# Patient Record
Sex: MALE | Race: WHITE | NOT HISPANIC OR LATINO | Employment: UNEMPLOYED | URBAN - METROPOLITAN AREA
[De-identification: names, ages, dates, MRNs, and addresses within clinical notes are randomized per-mention and may not be internally consistent; named-entity substitution may affect disease eponyms.]

---

## 2018-04-24 ENCOUNTER — HOSPITAL ENCOUNTER (EMERGENCY)
Facility: HOSPITAL | Age: 53
Discharge: HOME | End: 2018-04-24
Attending: EMERGENCY MEDICINE
Payer: MEDICAID

## 2018-04-24 VITALS
WEIGHT: 195.13 LBS | SYSTOLIC BLOOD PRESSURE: 161 MMHG | HEART RATE: 71 BPM | BODY MASS INDEX: 30.63 KG/M2 | RESPIRATION RATE: 20 BRPM | OXYGEN SATURATION: 97 % | TEMPERATURE: 99 F | DIASTOLIC BLOOD PRESSURE: 90 MMHG | HEIGHT: 67 IN

## 2018-04-24 DIAGNOSIS — F15.10 METHAMPHETAMINE ABUSE: ICD-10-CM

## 2018-04-24 DIAGNOSIS — L29.9 ITCHING: ICD-10-CM

## 2018-04-24 DIAGNOSIS — L73.2 HIDRADENITIS SUPPURATIVA: Primary | ICD-10-CM

## 2018-04-24 PROCEDURE — 99283 EMERGENCY DEPT VISIT LOW MDM: CPT

## 2018-04-24 RX ORDER — HYDROXYZINE PAMOATE 25 MG/1
25 CAPSULE ORAL 4 TIMES DAILY
Qty: 30 CAPSULE | Refills: 0 | Status: ON HOLD | OUTPATIENT
Start: 2018-04-24 | End: 2024-02-02 | Stop reason: HOSPADM

## 2018-04-24 RX ORDER — DEXAMETHASONE 4 MG/1
4 TABLET ORAL DAILY
Qty: 5 TABLET | Refills: 0 | Status: SHIPPED | OUTPATIENT
Start: 2018-04-24 | End: 2018-04-29

## 2018-04-24 RX ORDER — DOXYCYCLINE 100 MG/1
100 CAPSULE ORAL 2 TIMES DAILY
Qty: 20 CAPSULE | Refills: 0 | Status: SHIPPED | OUTPATIENT
Start: 2018-04-24 | End: 2018-05-04

## 2018-04-25 NOTE — ED PROVIDER NOTES
SCRIBE #1 NOTE: I, Jana Meza, am scribing for, and in the presence of, Shawn March NP. I have scribed the entire note.      History      Chief Complaint   Patient presents with    Abscess     pt reports multiple sores/abscess to axilla with itching       Review of patient's allergies indicates:  No Known Allergies     HPI   HPI    4/24/2018, 10:11 PM   History obtained from the patient      History of Present Illness: David Guzman is a 52 y.o. male patient who presents to the Emergency Department for sores to bilateral axilla and itching to bilateral arms which onset gradually a few days ago. Symptoms are constant and moderate in severity.  No mitigating or exacerbating factors reported. No associated sxs reported. Patient denies any fever, chills, nausea, vomiting, cough, rash, and all other sxs at this time. Prior Tx reported. Pt admits to doing meth, but states he quit a week ago. No further complaints or concerns at this time.         Arrival mode: Personal vehicle     PCP: Primary Doctor No       Past Medical History:  Past Medical History:   Diagnosis Date    Alcohol abuse     Anxiety     Cocaine abuse     Drug abuse     Tobacco use        Past Surgical History:  Past Surgical History:   Procedure Laterality Date    HERNIA REPAIR           Family History:  Family History   Problem Relation Age of Onset    COPD Neg Hx        Social History:  Social History     Social History Main Topics    Smoking status: Former Smoker    Smokeless tobacco: Never Used    Alcohol use Yes      Comment: occasionally    Drug use: Yes     Types: Cocaine    Sexual activity: unknown       ROS   Review of Systems   Constitutional: Negative for chills and fever.   HENT: Negative for congestion, rhinorrhea and sore throat.    Respiratory: Negative for cough and shortness of breath.    Cardiovascular: Negative for chest pain.   Gastrointestinal: Negative for nausea and vomiting.   Genitourinary: Negative for  "dysuria and hematuria.   Musculoskeletal: Negative for back pain and neck pain.   Skin: Negative for rash.        (+) sores to bilateral axilla  (+) itching to bilateral arms     Neurological: Negative for dizziness and headaches.       Physical Exam      Initial Vitals [04/24/18 2051]   BP Pulse Resp Temp SpO2   (!) 161/90 71 20 98.5 °F (36.9 °C) 97 %      MAP       113.67          Physical Exam  Nursing Notes and Vital Signs Reviewed.  Constitutional: Patient is in no apparent distress. Well-developed and well-nourished.  Head: Atraumatic. Normocephalic.  Eyes: PERRL. EOM intact. Conjunctivae are not pale. No scleral icterus.  ENT: Mucous membranes are moist.    Neck: Supple. Full ROM. No lymphadenopathy.  Cardiovascular: Regular rate. Regular rhythm. No murmurs, rubs, or gallops. Distal pulses are 2+ and symmetric.  Pulmonary/Chest: No respiratory distress. Clear to auscultation bilaterally. No wheezing or rales.  Abdominal: Soft and non-distended.  There is no tenderness.  No rebound, guarding, or rigidity. Good bowel sounds.  Musculoskeletal: Moves all extremities. No obvious deformities. No edema. No calf tenderness.  Skin: Warm and dry. Deep areas of induration to left axilla with erythema. No fluctuance noted to surface.   Neurological:  Alert, awake, and appropriate.  Normal speech.  No acute focal neurological deficits are appreciated.  Psychiatric: Normal affect. Good eye contact. Appropriate in content.    ED Course    Procedures  ED Vital Signs:  Vitals:    04/24/18 2051   BP: (!) 161/90   Pulse: 71   Resp: 20   Temp: 98.5 °F (36.9 °C)   TempSrc: Oral   SpO2: 97%   Weight: 88.5 kg (195 lb 1.7 oz)   Height: 5' 7" (1.702 m)            The Emergency Provider reviewed the vital signs and test results, which are outlined above.    ED Discussion     10:17 PM:  Discussed with pt all pertinent ED information. Discussed pt dx and plan of tx. Gave pt all f/u and return to the ED instructions. All questions and " concerns were addressed at this time. Pt expresses understanding of information and instructions, and is comfortable with plan to discharge. Pt is stable for discharge.    I discussed with patient and/or family/caretaker that evaluation in the ED does not suggest any emergent or life threatening medical conditions requiring immediate intervention beyond what was provided in the ED, and I believe patient is safe for discharge.  Regardless, an unremarkable evaluation in the ED does not preclude the development or presence of a serious of life threatening condition. As such, patient was instructed to return immediately for any worsening or change in current symptoms.      ED Medication(s):  Medications - No data to display    Discharge Medication List as of 4/24/2018 10:13 PM      START taking these medications    Details   dexamethasone (DECADRON) 4 MG Tab Take 1 tablet (4 mg total) by mouth once daily., Starting Tue 4/24/2018, Until Sun 4/29/2018, Print      doxycycline (VIBRAMYCIN) 100 MG Cap Take 1 capsule (100 mg total) by mouth 2 (two) times daily., Starting Tue 4/24/2018, Until Fri 5/4/2018, Print      hydrOXYzine pamoate (VISTARIL) 25 MG Cap Take 1 capsule (25 mg total) by mouth 4 (four) times daily., Starting Tue 4/24/2018, Print             Follow-up Information     [facility]    Specialty:  Emergency Medicine  Why:  As needed, If symptoms worsen  Contact information:  [address]  [facility] [address] [phone]                   Medical Decision Making              Scribe Attestation:   Scribe #1: I performed the above scribed service and the documentation accurately describes the services I performed. I attest to the accuracy of the note.    Attending:   Physician Attestation Statement for Scribe #1: I, Shawn March NP, personally performed the services described in this documentation, as scribed by Jana Meza, in my presence, and it is both accurate and  complete.          Clinical Impression       ICD-10-CM ICD-9-CM   1. Hidradenitis suppurativa L73.2 705.83   2. Itching L29.9 698.9   3. Methamphetamine abuse F15.10 305.70       Disposition:   Disposition: Discharged  Condition: Stable         Shawn March NP  04/25/18 0026       Shawn March NP  04/25/18 0026

## 2020-07-31 ENCOUNTER — HOSPITAL ENCOUNTER (EMERGENCY)
Facility: HOSPITAL | Age: 55
Discharge: HOME | End: 2020-07-31
Attending: STUDENT IN AN ORGANIZED HEALTH CARE EDUCATION/TRAINING PROGRAM
Payer: MEDICAID

## 2020-07-31 VITALS
WEIGHT: 199.88 LBS | OXYGEN SATURATION: 98 % | RESPIRATION RATE: 18 BRPM | DIASTOLIC BLOOD PRESSURE: 87 MMHG | HEART RATE: 72 BPM | TEMPERATURE: 98 F | BODY MASS INDEX: 31.37 KG/M2 | HEIGHT: 67 IN | SYSTOLIC BLOOD PRESSURE: 154 MMHG

## 2020-07-31 DIAGNOSIS — K02.9 PAIN DUE TO DENTAL CARIES: Primary | ICD-10-CM

## 2020-07-31 PROCEDURE — 99284 EMERGENCY DEPT VISIT MOD MDM: CPT

## 2020-07-31 RX ORDER — AMOXICILLIN AND CLAVULANATE POTASSIUM 875; 125 MG/1; MG/1
1 TABLET, FILM COATED ORAL 2 TIMES DAILY
Qty: 14 TABLET | Refills: 0 | Status: SHIPPED | OUTPATIENT
Start: 2020-07-31 | End: 2020-08-07

## 2020-07-31 RX ORDER — TRAMADOL HYDROCHLORIDE 50 MG/1
50 TABLET ORAL EVERY 6 HOURS PRN
Qty: 12 TABLET | Refills: 0 | Status: ON HOLD | OUTPATIENT
Start: 2020-07-31 | End: 2024-02-02 | Stop reason: HOSPADM

## 2020-08-01 NOTE — ED PROVIDER NOTES
HISTORY     Chief Complaint   Patient presents with    Dental Pain     States has bad tooth with infection.  Requesting some antibiotics and pain medication.  Never followed up with dentist.     Review of patient's allergies indicates:  No Known Allergies     HPI   The history is provided by the patient. No  was used.   Dental Pain  Primary symptoms do not include dental injury, oral bleeding, oral lesions, headaches, fever, shortness of breath, sore throat or angioedema. Primary symptoms comment: right lower tooth pain. The symptoms began several days ago. The symptoms are worsening. The symptoms are recurrent. The symptoms occur constantly.   Additional symptoms do not include: dental sensitivity to temperature, gum swelling, gum tenderness, purulent gums, trismus, jaw pain, facial swelling, trouble swallowing, pain with swallowing, excessive salivation, dry mouth, taste disturbance, smell disturbance, drooling, ear pain, hearing loss, nosebleeds, swollen glands, goiter and fatigue.        PCP: Martha Chung MD     Past Medical History:  Past Medical History:   Diagnosis Date    Alcohol abuse     Anxiety     Cocaine abuse     Drug abuse     Tobacco use         Past Surgical History:  Past Surgical History:   Procedure Laterality Date    HERNIA REPAIR          Family History:  Family History   Problem Relation Age of Onset    COPD Neg Hx         Social History:  Social History     Tobacco Use    Smoking status: Former Smoker    Smokeless tobacco: Never Used   Substance and Sexual Activity    Alcohol use: Yes     Comment: occasionally    Drug use: Yes     Types: Cocaine    Sexual activity: Not on file         ROS   Review of Systems   Constitutional: Negative for fatigue and fever.   HENT: Positive for dental problem. Negative for drooling, ear pain, facial swelling, hearing loss, nosebleeds, sore throat and trouble swallowing.    Respiratory: Negative for shortness of  "breath.    Cardiovascular: Negative for chest pain.   Gastrointestinal: Negative for nausea.   Genitourinary: Negative for dysuria.   Musculoskeletal: Negative for back pain.   Skin: Negative for rash.   Neurological: Negative for dizziness, weakness and headaches.   Hematological: Does not bruise/bleed easily.   Psychiatric/Behavioral: Negative for agitation.       PHYSICAL EXAM     Initial Vitals [07/31/20 2152]   BP Pulse Resp Temp SpO2   (!) 154/87 72 18 98 °F (36.7 °C) 98 %      MAP       --           Physical Exam    Nursing note and vitals reviewed.  Constitutional: He appears well-developed and well-nourished. He is not diaphoretic. No distress.   HENT:   Head: Normocephalic and atraumatic.   Dental decay to right lower second molar. No abscess. No drainage, no trismus    Eyes: Right eye exhibits no discharge. Left eye exhibits no discharge.   Neck: Normal range of motion. Neck supple.   Cardiovascular: Normal rate.   Pulmonary/Chest: Breath sounds normal. No respiratory distress. He has no wheezes. He has no rhonchi. He has no rales.   Abdominal: Soft. Bowel sounds are normal.   Musculoskeletal: Normal range of motion.   Neurological: He is alert and oriented to person, place, and time. He has normal strength.   Skin: Skin is warm and dry.   Psychiatric: He has a normal mood and affect. His behavior is normal. Thought content normal.          ED COURSE   Procedures  ED ONGOING VITALS:  Vitals:    07/31/20 2152   BP: (!) 154/87   Pulse: 72   Resp: 18   Temp: 98 °F (36.7 °C)   TempSrc: Oral   SpO2: 98%   Weight: 90.6 kg (199 lb 13.6 oz)   Height: 5' 7" (1.702 m)         ABNORMAL LAB VALUES:  Labs Reviewed   HIV 1 / 2 ANTIBODY   HEPATITIS C ANTIBODY         ALL LAB VALUES:  none      RADIOLOGY STUDIES:  Imaging Results    None                   The above vital signs and test results have been reviewed by the emergency provider.     ED Medications:  Current Discharge Medication List        Discharge " Medications:  New Prescriptions    AMOXICILLIN-CLAVULANATE 875-125MG (AUGMENTIN) 875-125 MG PER TABLET    Take 1 tablet by mouth 2 (two) times daily. for 7 days    TRAMADOL (ULTRAM) 50 MG TABLET    Take 1 tablet (50 mg total) by mouth every 6 (six) hours as needed for Pain.      Follow-up Information     Schedule an appointment as soon as possible for a visit  with dentist of choice.                10:08 PM    I discussed with patient and/or family/caretaker that evaluation in the ED does not suggest any emergent or life threatening medical conditions requiring immediate intervention beyond what was provided in the ED, and I believe patient is safe for discharge. Regardless, an unremarkable evaluation in the ED does not preclude the development or presence of a serious or life threatening condition. As such, patient was instructed to return immediately for any worsening or change in current symptoms.    Pre-hypertension/Hypertension: The pt has been informed that they may have pre-hypertension or hypertension based on a blood pressure reading in the ED. I recommend that the pt call the PCP listed on their discharge instructions or a physician of their choice this week to arrange f/u for further evaluation of possible pre-hypertension or hypertension.       MEDICAL DECISION MAKING                 CLINICAL IMPRESSION       ICD-10-CM ICD-9-CM   1. Pain due to dental caries  K02.9 521.00       Disposition:   Disposition: Discharged  Condition: Stable         Sidney Orona NP  07/31/20 0956

## 2020-10-10 ENCOUNTER — HOSPITAL ENCOUNTER (EMERGENCY)
Facility: HOSPITAL | Age: 55
Discharge: HOME | End: 2020-10-10
Attending: EMERGENCY MEDICINE
Payer: MEDICAID

## 2020-10-10 VITALS
DIASTOLIC BLOOD PRESSURE: 104 MMHG | RESPIRATION RATE: 16 BRPM | OXYGEN SATURATION: 97 % | HEART RATE: 70 BPM | TEMPERATURE: 98 F | WEIGHT: 205.94 LBS | HEIGHT: 67 IN | BODY MASS INDEX: 32.32 KG/M2 | SYSTOLIC BLOOD PRESSURE: 174 MMHG

## 2020-10-10 DIAGNOSIS — K02.9 DENTAL CARIES: Primary | ICD-10-CM

## 2020-10-10 PROCEDURE — 99284 EMERGENCY DEPT VISIT MOD MDM: CPT

## 2020-10-10 RX ORDER — KETOROLAC TROMETHAMINE 10 MG/1
10 TABLET, FILM COATED ORAL EVERY 6 HOURS PRN
Qty: 12 TABLET | Refills: 0 | Status: ON HOLD | OUTPATIENT
Start: 2020-10-10 | End: 2024-02-02 | Stop reason: HOSPADM

## 2020-10-10 RX ORDER — AMOXICILLIN 500 MG/1
500 CAPSULE ORAL 3 TIMES DAILY
Qty: 21 CAPSULE | Refills: 0 | Status: SHIPPED | OUTPATIENT
Start: 2020-10-10 | End: 2020-10-17

## 2020-10-10 NOTE — ED PROVIDER NOTES
Encounter Date: 10/10/2020       History     Chief Complaint   Patient presents with    Dental Pain     x 2 months, right lower tooth     55 year old male with complaint of right bottom tooth ache X several days.  Worse with eating and drinking. No facial swelling. No alleviating factors.  No fever or chills.         Review of patient's allergies indicates:  No Known Allergies  Past Medical History:   Diagnosis Date    Alcohol abuse     Anxiety     Cocaine abuse     Drug abuse     Tobacco use      Past Surgical History:   Procedure Laterality Date    HERNIA REPAIR       Family History   Problem Relation Age of Onset    COPD Neg Hx      Social History     Tobacco Use    Smoking status: Former Smoker    Smokeless tobacco: Never Used   Substance Use Topics    Alcohol use: Yes     Comment: occasionally    Drug use: Yes     Types: Cocaine     Review of Systems   Constitutional: Negative for fever.   HENT: Positive for dental problem. Negative for sore throat.    Respiratory: Negative for shortness of breath.    Cardiovascular: Negative for chest pain.   Gastrointestinal: Negative for nausea.   Genitourinary: Negative for dysuria.   Musculoskeletal: Negative for back pain.   Skin: Negative for rash.   Neurological: Negative for weakness.   Hematological: Does not bruise/bleed easily.       Physical Exam     Initial Vitals [10/10/20 1130]   BP Pulse Resp Temp SpO2   (!) 174/104 70 16 98.1 °F (36.7 °C) 97 %      MAP       --         Physical Exam    Nursing note and vitals reviewed.  Constitutional: He appears well-developed and well-nourished.   HENT:   Head: Normocephalic and atraumatic.   Diffuse dental decay, tenderness right bottom molar #2 and #3, no swelling around tooth   Eyes: Conjunctivae are normal. Pupils are equal, round, and reactive to light.   Neck: Normal range of motion. Neck supple.   Cardiovascular: Normal rate, regular rhythm, normal heart sounds and intact distal pulses.   Pulmonary/Chest:  Breath sounds normal.   Abdominal: Soft. There is no rebound and no guarding.   Musculoskeletal: Normal range of motion.   Neurological: He is alert.   Skin: Skin is warm and dry.   Psychiatric: He has a normal mood and affect. His behavior is normal. Thought content normal.         ED Course   Procedures  Labs Reviewed   HIV 1 / 2 ANTIBODY   HEPATITIS C ANTIBODY          Imaging Results    None                                      Clinical Impression:     ICD-10-CM ICD-9-CM   1. Dental caries  K02.9 521.00                          ED Disposition Condition    Discharge Stable        ED Prescriptions     Medication Sig Dispense Start Date End Date Auth. Provider    amoxicillin (AMOXIL) 500 MG capsule Take 1 capsule (500 mg total) by mouth 3 (three) times daily. for 7 days 21 capsule 10/10/2020 10/17/2020 Manish Nava NP    ketorolac (TORADOL) 10 mg tablet Take 1 tablet (10 mg total) by mouth every 6 (six) hours as needed for Pain. 12 tablet 10/10/2020  Manish Nava NP        Follow-up Information     Follow up With Specialties Details Why Contact Info    Dentist of choice  Schedule an appointment as soon as possible for a visit  As needed                                        Manish Nava NP  10/10/20 1148

## 2020-11-19 ENCOUNTER — HOSPITAL ENCOUNTER (EMERGENCY)
Facility: HOSPITAL | Age: 55
Discharge: HOME | End: 2020-11-19
Attending: EMERGENCY MEDICINE
Payer: MEDICAID

## 2020-11-19 VITALS
HEART RATE: 74 BPM | TEMPERATURE: 99 F | HEIGHT: 67 IN | BODY MASS INDEX: 32.87 KG/M2 | SYSTOLIC BLOOD PRESSURE: 168 MMHG | WEIGHT: 209.44 LBS | DIASTOLIC BLOOD PRESSURE: 106 MMHG | OXYGEN SATURATION: 99 % | RESPIRATION RATE: 20 BRPM

## 2020-11-19 DIAGNOSIS — K08.89 PAIN, DENTAL: Primary | ICD-10-CM

## 2020-11-19 DIAGNOSIS — K02.9 TOOTH DECAY: ICD-10-CM

## 2020-11-19 PROCEDURE — 99284 EMERGENCY DEPT VISIT MOD MDM: CPT

## 2020-11-19 PROCEDURE — 25000003 PHARM REV CODE 250: Performed by: REGISTERED NURSE

## 2020-11-19 RX ORDER — IBUPROFEN 800 MG/1
800 TABLET ORAL EVERY 6 HOURS PRN
Qty: 20 TABLET | Refills: 0 | Status: ON HOLD | OUTPATIENT
Start: 2020-11-19 | End: 2024-02-02 | Stop reason: HOSPADM

## 2020-11-19 RX ORDER — IBUPROFEN 800 MG/1
800 TABLET ORAL
Status: COMPLETED | OUTPATIENT
Start: 2020-11-19 | End: 2020-11-19

## 2020-11-19 RX ORDER — AMOXICILLIN AND CLAVULANATE POTASSIUM 875; 125 MG/1; MG/1
1 TABLET, FILM COATED ORAL 2 TIMES DAILY
Qty: 20 TABLET | Refills: 0 | Status: SHIPPED | OUTPATIENT
Start: 2020-11-19 | End: 2020-11-29

## 2020-11-19 RX ORDER — TRAMADOL HYDROCHLORIDE 50 MG/1
50 TABLET ORAL EVERY 6 HOURS PRN
Qty: 12 TABLET | Refills: 0 | Status: ON HOLD | OUTPATIENT
Start: 2020-11-19 | End: 2024-02-02 | Stop reason: HOSPADM

## 2020-11-19 RX ORDER — AMOXICILLIN AND CLAVULANATE POTASSIUM 875; 125 MG/1; MG/1
1 TABLET, FILM COATED ORAL
Status: COMPLETED | OUTPATIENT
Start: 2020-11-19 | End: 2020-11-19

## 2020-11-19 RX ADMIN — IBUPROFEN 800 MG: 800 TABLET ORAL at 09:11

## 2020-11-19 RX ADMIN — AMOXICILLIN AND CLAVULANATE POTASSIUM 1 TABLET: 875; 125 TABLET, FILM COATED ORAL at 09:11

## 2020-11-20 NOTE — ED NOTES
Patient identifiers verified and correct for David Guzman.    LOC: The patient is awake, alert and aware of environment with an appropriate affect, the patient is oriented x 3 and speaking appropriately.  APPEARANCE: Patient resting comfortably and in no acute distress, patient is clean and well groomed, patient's clothing is properly fastened.  SKIN: The skin is warm and dry, color consistent with ethnicity, patient has normal skin turgor and moist mucus membranes, skin intact, no breakdown or bruising noted.  MUSCULOSKELETAL: Patient moving all extremities spontaneously.  RESPIRATORY: Airway is open and patent, respirations are spontaneous.  CARDIAC: Patient has a normal rate, no periphreal edema noted, capillary refill < 3 seconds.  ABDOMEN: Soft and non tender to palpation.    Pt states that he has a tooth infection that is now causing ear pain on the rt side.

## 2020-11-20 NOTE — ED PROVIDER NOTES
"Encounter Date: 11/19/2020       History     Chief Complaint   Patient presents with    Dental Pain     Pt states Right sided dental pain; "I have an infected tooth on and off for months."      The history is provided by the patient.   Dental Pain  The primary symptoms include mouth pain. Primary symptoms do not include fever, shortness of breath or sore throat. The symptoms began several days ago. The symptoms are worsening. The symptoms are new. The symptoms occur constantly.   Affected locations include: teeth and gum(s).   Additional symptoms include: dental sensitivity to temperature, gum swelling, gum tenderness, jaw pain and ear pain. Additional symptoms do not include: trismus and facial swelling.     Review of patient's allergies indicates:  No Known Allergies  Past Medical History:   Diagnosis Date    Alcohol abuse     Anxiety     Cocaine abuse     Drug abuse     Tobacco use      Past Surgical History:   Procedure Laterality Date    HERNIA REPAIR       Family History   Problem Relation Age of Onset    COPD Neg Hx      Social History     Tobacco Use    Smoking status: Former Smoker    Smokeless tobacco: Never Used   Substance Use Topics    Alcohol use: Yes     Comment: occasionally    Drug use: Yes     Types: Cocaine     Review of Systems   Constitutional: Negative for fever.   HENT: Positive for dental problem and ear pain. Negative for facial swelling and sore throat.    Respiratory: Negative for shortness of breath.    Cardiovascular: Negative for chest pain.   Gastrointestinal: Negative for nausea.   Genitourinary: Negative for dysuria.   Musculoskeletal: Negative for back pain.   Skin: Negative for rash.   Neurological: Negative for weakness.   Hematological: Does not bruise/bleed easily.   All other systems reviewed and are negative.      Physical Exam     Initial Vitals [11/19/20 2051]   BP Pulse Resp Temp SpO2   (!) 168/106 74 20 98.6 °F (37 °C) 99 %      MAP       --         Physical " Exam    Constitutional: He appears well-developed and well-nourished. No distress.   HENT:   Head: Normocephalic and atraumatic.   Nose: Nose normal.   Mouth/Throat: Uvula is midline and oropharynx is clear and moist. Dental caries present. No dental abscesses.       Tooth decay to R lower molar with gum swelling   Eyes: Conjunctivae and EOM are normal. Pupils are equal, round, and reactive to light.   Neck: Normal range of motion. Neck supple.   Cardiovascular: Normal rate and regular rhythm.   Pulmonary/Chest: Effort normal and breath sounds normal. No respiratory distress. He has no decreased breath sounds. He has no wheezes. He has no rales.   Abdominal: Soft. Normal appearance and bowel sounds are normal. There is no abdominal tenderness.   Musculoskeletal: Normal range of motion.   Neurological: He is alert and oriented to person, place, and time. He has normal strength. GCS eye subscore is 4. GCS verbal subscore is 5. GCS motor subscore is 6.   Skin: Skin is warm and dry. Capillary refill takes less than 2 seconds. No rash noted.   Psychiatric: He has a normal mood and affect. His speech is normal and behavior is normal.         ED Course   Procedures  Labs Reviewed   HIV 1 / 2 ANTIBODY   HEPATITIS C ANTIBODY          Imaging Results    None               I discussed with patient and/or family/caretaker that evaluation in the ED does not suggest any emergent or life threatening medical conditions requiring immediate intervention beyond what was provided in the ED, and I believe patient is safe for discharge.  Regardless, an unremarkable evaluation in the ED does not preclude the development or presence of a serious of life threatening condition. As such, patient was instructed to return immediately for any worsening or change in current symptoms.                          Clinical Impression:     ICD-10-CM ICD-9-CM   1. Pain, dental  K08.89 525.9   2. Tooth decay  K02.9 521.00                          ED  Disposition Condition    Discharge Stable        ED Prescriptions     Medication Sig Dispense Start Date End Date Auth. Provider    amoxicillin-clavulanate 875-125mg (AUGMENTIN) 875-125 mg per tablet Take 1 tablet by mouth 2 (two) times daily. for 10 days 20 tablet 11/19/2020 11/29/2020 JACOB Calix Jr.    ibuprofen (ADVIL,MOTRIN) 800 MG tablet Take 1 tablet (800 mg total) by mouth every 6 (six) hours as needed for Pain. 20 tablet 11/19/2020  JACOB Calix Jr.    traMADoL (ULTRAM) 50 mg tablet Take 1 tablet (50 mg total) by mouth every 6 (six) hours as needed. 12 tablet 11/19/2020  JACOB Calix Jr.        Follow-up Information     Follow up With Specialties Details Why Contact Info    [facility] [place]  Schedule an appointment as soon as possible for a visit   [address] [address]  [phone]                                         JACOB Calix Jr.  11/20/20 0938

## 2024-02-01 ENCOUNTER — HOSPITAL ENCOUNTER (OUTPATIENT)
Facility: HOSPITAL | Age: 59
Discharge: HOME | End: 2024-02-02
Attending: EMERGENCY MEDICINE | Admitting: HOSPITALIST
Payer: MEDICAID

## 2024-02-01 DIAGNOSIS — L08.9 WOUND INFECTION: ICD-10-CM

## 2024-02-01 DIAGNOSIS — T14.8XXA WOUND INFECTION: ICD-10-CM

## 2024-02-01 DIAGNOSIS — R07.9 CHEST PAIN: ICD-10-CM

## 2024-02-01 DIAGNOSIS — L03.113 RIGHT ARM CELLULITIS: ICD-10-CM

## 2024-02-01 DIAGNOSIS — L03.113 CELLULITIS OF RIGHT UPPER EXTREMITY: Primary | ICD-10-CM

## 2024-02-01 LAB
ALBUMIN SERPL BCP-MCNC: 3.2 G/DL (ref 3.5–5.2)
ALP SERPL-CCNC: 143 U/L (ref 55–135)
ALT SERPL W/O P-5'-P-CCNC: 42 U/L (ref 10–44)
ANION GAP SERPL CALC-SCNC: 14 MMOL/L (ref 8–16)
AST SERPL-CCNC: 67 U/L (ref 10–40)
BASOPHILS # BLD AUTO: 0.04 K/UL (ref 0–0.2)
BASOPHILS NFR BLD: 0.4 % (ref 0–1.9)
BILIRUB SERPL-MCNC: 0.9 MG/DL (ref 0.1–1)
BUN SERPL-MCNC: 54 MG/DL (ref 6–20)
CALCIUM SERPL-MCNC: 8.8 MG/DL (ref 8.7–10.5)
CHLORIDE SERPL-SCNC: 104 MMOL/L (ref 95–110)
CO2 SERPL-SCNC: 17 MMOL/L (ref 23–29)
CREAT SERPL-MCNC: 12.5 MG/DL (ref 0.5–1.4)
CRP SERPL-MCNC: 35.8 MG/L (ref 0–8.2)
DIFFERENTIAL METHOD BLD: ABNORMAL
EOSINOPHIL # BLD AUTO: 0.8 K/UL (ref 0–0.5)
EOSINOPHIL NFR BLD: 8.7 % (ref 0–8)
ERYTHROCYTE [DISTWIDTH] IN BLOOD BY AUTOMATED COUNT: 15.4 % (ref 11.5–14.5)
EST. GFR  (NO RACE VARIABLE): 4 ML/MIN/1.73 M^2
GLUCOSE SERPL-MCNC: 87 MG/DL (ref 70–110)
HCT VFR BLD AUTO: 28.7 % (ref 40–54)
HGB BLD-MCNC: 9.7 G/DL (ref 14–18)
IMM GRANULOCYTES # BLD AUTO: 0.03 K/UL (ref 0–0.04)
IMM GRANULOCYTES NFR BLD AUTO: 0.3 % (ref 0–0.5)
LACTATE SERPL-SCNC: 0.8 MMOL/L (ref 0.5–2.2)
LYMPHOCYTES # BLD AUTO: 0.7 K/UL (ref 1–4.8)
LYMPHOCYTES NFR BLD: 7.3 % (ref 18–48)
MCH RBC QN AUTO: 30.3 PG (ref 27–31)
MCHC RBC AUTO-ENTMCNC: 33.8 G/DL (ref 32–36)
MCV RBC AUTO: 90 FL (ref 82–98)
MONOCYTES # BLD AUTO: 1.2 K/UL (ref 0.3–1)
MONOCYTES NFR BLD: 13.6 % (ref 4–15)
NEUTROPHILS # BLD AUTO: 6.2 K/UL (ref 1.8–7.7)
NEUTROPHILS NFR BLD: 69.7 % (ref 38–73)
NRBC BLD-RTO: 0 /100 WBC
PLATELET # BLD AUTO: 124 K/UL (ref 150–450)
PMV BLD AUTO: 12 FL (ref 9.2–12.9)
POTASSIUM SERPL-SCNC: 4.9 MMOL/L (ref 3.5–5.1)
PROT SERPL-MCNC: 7.4 G/DL (ref 6–8.4)
RBC # BLD AUTO: 3.2 M/UL (ref 4.6–6.2)
SODIUM SERPL-SCNC: 135 MMOL/L (ref 136–145)
WBC # BLD AUTO: 8.96 K/UL (ref 3.9–12.7)

## 2024-02-01 PROCEDURE — 83605 ASSAY OF LACTIC ACID: CPT | Performed by: NURSE PRACTITIONER

## 2024-02-01 PROCEDURE — 63600175 PHARM REV CODE 636 W HCPCS: Performed by: EMERGENCY MEDICINE

## 2024-02-01 PROCEDURE — 86140 C-REACTIVE PROTEIN: CPT | Performed by: NURSE PRACTITIONER

## 2024-02-01 PROCEDURE — 85025 COMPLETE CBC W/AUTO DIFF WBC: CPT | Performed by: NURSE PRACTITIONER

## 2024-02-01 PROCEDURE — G0378 HOSPITAL OBSERVATION PER HR: HCPCS

## 2024-02-01 PROCEDURE — 96366 THER/PROPH/DIAG IV INF ADDON: CPT

## 2024-02-01 PROCEDURE — 96375 TX/PRO/DX INJ NEW DRUG ADDON: CPT

## 2024-02-01 PROCEDURE — 96365 THER/PROPH/DIAG IV INF INIT: CPT

## 2024-02-01 PROCEDURE — 25000003 PHARM REV CODE 250: Performed by: EMERGENCY MEDICINE

## 2024-02-01 PROCEDURE — 80053 COMPREHEN METABOLIC PANEL: CPT | Performed by: NURSE PRACTITIONER

## 2024-02-01 PROCEDURE — 99285 EMERGENCY DEPT VISIT HI MDM: CPT | Mod: 25

## 2024-02-01 PROCEDURE — 25000003 PHARM REV CODE 250: Performed by: HOSPITALIST

## 2024-02-01 PROCEDURE — 87040 BLOOD CULTURE FOR BACTERIA: CPT | Performed by: NURSE PRACTITIONER

## 2024-02-01 PROCEDURE — 63600175 PHARM REV CODE 636 W HCPCS: Performed by: HOSPITALIST

## 2024-02-01 PROCEDURE — 96372 THER/PROPH/DIAG INJ SC/IM: CPT | Mod: 59 | Performed by: HOSPITALIST

## 2024-02-01 RX ORDER — PROMETHAZINE HYDROCHLORIDE 25 MG/1
25 TABLET ORAL EVERY 6 HOURS PRN
Status: DISCONTINUED | OUTPATIENT
Start: 2024-02-01 | End: 2024-02-02 | Stop reason: HOSPADM

## 2024-02-01 RX ORDER — SUCROFERRIC OXYHYDROXIDE 500 MG/1
2 TABLET, CHEWABLE ORAL
COMMUNITY
Start: 2023-09-13

## 2024-02-01 RX ORDER — HYDRALAZINE HYDROCHLORIDE 100 MG/1
100 TABLET, FILM COATED ORAL 3 TIMES DAILY
COMMUNITY

## 2024-02-01 RX ORDER — CARVEDILOL 12.5 MG/1
12.5 TABLET ORAL 2 TIMES DAILY
Status: DISCONTINUED | OUTPATIENT
Start: 2024-02-01 | End: 2024-02-02 | Stop reason: HOSPADM

## 2024-02-01 RX ORDER — AMOXICILLIN 250 MG
1 CAPSULE ORAL 2 TIMES DAILY PRN
Status: DISCONTINUED | OUTPATIENT
Start: 2024-02-01 | End: 2024-02-02 | Stop reason: HOSPADM

## 2024-02-01 RX ORDER — AMLODIPINE BESYLATE 10 MG/1
10 TABLET ORAL DAILY
Status: DISCONTINUED | OUTPATIENT
Start: 2024-02-02 | End: 2024-02-02 | Stop reason: HOSPADM

## 2024-02-01 RX ORDER — CARVEDILOL 12.5 MG/1
1 TABLET ORAL 2 TIMES DAILY
COMMUNITY
Start: 2023-08-01

## 2024-02-01 RX ORDER — HEPARIN SODIUM 5000 [USP'U]/ML
5000 INJECTION, SOLUTION INTRAVENOUS; SUBCUTANEOUS EVERY 8 HOURS
Status: DISCONTINUED | OUTPATIENT
Start: 2024-02-01 | End: 2024-02-02 | Stop reason: HOSPADM

## 2024-02-01 RX ORDER — NALOXONE HCL 0.4 MG/ML
0.02 VIAL (ML) INJECTION
Status: DISCONTINUED | OUTPATIENT
Start: 2024-02-01 | End: 2024-02-02 | Stop reason: HOSPADM

## 2024-02-01 RX ORDER — IBUPROFEN 200 MG
16 TABLET ORAL
Status: DISCONTINUED | OUTPATIENT
Start: 2024-02-01 | End: 2024-02-02 | Stop reason: HOSPADM

## 2024-02-01 RX ORDER — TAMSULOSIN HYDROCHLORIDE 0.4 MG/1
0.4 CAPSULE ORAL DAILY
COMMUNITY
Start: 2023-08-01

## 2024-02-01 RX ORDER — ALUMINUM HYDROXIDE, MAGNESIUM HYDROXIDE, AND SIMETHICONE 1200; 120; 1200 MG/30ML; MG/30ML; MG/30ML
30 SUSPENSION ORAL 4 TIMES DAILY PRN
Status: DISCONTINUED | OUTPATIENT
Start: 2024-02-01 | End: 2024-02-02 | Stop reason: HOSPADM

## 2024-02-01 RX ORDER — ACETAMINOPHEN 650 MG/1
650 SUPPOSITORY RECTAL EVERY 6 HOURS PRN
Status: DISCONTINUED | OUTPATIENT
Start: 2024-02-01 | End: 2024-02-02 | Stop reason: HOSPADM

## 2024-02-01 RX ORDER — MORPHINE SULFATE 4 MG/ML
2 INJECTION, SOLUTION INTRAMUSCULAR; INTRAVENOUS EVERY 4 HOURS PRN
Status: DISCONTINUED | OUTPATIENT
Start: 2024-02-01 | End: 2024-02-02 | Stop reason: HOSPADM

## 2024-02-01 RX ORDER — AMLODIPINE BESYLATE 10 MG/1
1 TABLET ORAL DAILY
COMMUNITY
Start: 2023-08-01

## 2024-02-01 RX ORDER — IBUPROFEN 200 MG
24 TABLET ORAL
Status: DISCONTINUED | OUTPATIENT
Start: 2024-02-01 | End: 2024-02-02 | Stop reason: HOSPADM

## 2024-02-01 RX ORDER — ONDANSETRON HYDROCHLORIDE 2 MG/ML
4 INJECTION, SOLUTION INTRAVENOUS EVERY 8 HOURS PRN
Status: DISCONTINUED | OUTPATIENT
Start: 2024-02-01 | End: 2024-02-02 | Stop reason: HOSPADM

## 2024-02-01 RX ORDER — HYDROCODONE BITARTRATE AND ACETAMINOPHEN 5; 325 MG/1; MG/1
1 TABLET ORAL EVERY 6 HOURS PRN
Status: DISCONTINUED | OUTPATIENT
Start: 2024-02-01 | End: 2024-02-02 | Stop reason: HOSPADM

## 2024-02-01 RX ORDER — SODIUM CHLORIDE 0.9 % (FLUSH) 0.9 %
10 SYRINGE (ML) INJECTION EVERY 12 HOURS PRN
Status: DISCONTINUED | OUTPATIENT
Start: 2024-02-01 | End: 2024-02-02 | Stop reason: HOSPADM

## 2024-02-01 RX ORDER — TALC
6 POWDER (GRAM) TOPICAL NIGHTLY PRN
Status: DISCONTINUED | OUTPATIENT
Start: 2024-02-01 | End: 2024-02-02 | Stop reason: HOSPADM

## 2024-02-01 RX ORDER — GLUCAGON 1 MG
1 KIT INJECTION
Status: DISCONTINUED | OUTPATIENT
Start: 2024-02-01 | End: 2024-02-02 | Stop reason: HOSPADM

## 2024-02-01 RX ORDER — HYDRALAZINE HYDROCHLORIDE 50 MG/1
100 TABLET, FILM COATED ORAL 3 TIMES DAILY
Status: DISCONTINUED | OUTPATIENT
Start: 2024-02-01 | End: 2024-02-02 | Stop reason: HOSPADM

## 2024-02-01 RX ORDER — IPRATROPIUM BROMIDE AND ALBUTEROL SULFATE 2.5; .5 MG/3ML; MG/3ML
3 SOLUTION RESPIRATORY (INHALATION) EVERY 6 HOURS PRN
Status: DISCONTINUED | OUTPATIENT
Start: 2024-02-01 | End: 2024-02-02 | Stop reason: HOSPADM

## 2024-02-01 RX ORDER — ACETAMINOPHEN 325 MG/1
650 TABLET ORAL EVERY 8 HOURS PRN
Status: DISCONTINUED | OUTPATIENT
Start: 2024-02-01 | End: 2024-02-02 | Stop reason: HOSPADM

## 2024-02-01 RX ORDER — PANTOPRAZOLE SODIUM 40 MG/1
40 TABLET, DELAYED RELEASE ORAL DAILY
Status: DISCONTINUED | OUTPATIENT
Start: 2024-02-02 | End: 2024-02-02 | Stop reason: HOSPADM

## 2024-02-01 RX ADMIN — ONDANSETRON 4 MG: 2 INJECTION INTRAMUSCULAR; INTRAVENOUS at 10:02

## 2024-02-01 RX ADMIN — VANCOMYCIN HYDROCHLORIDE 2000 MG: 10 INJECTION, POWDER, LYOPHILIZED, FOR SOLUTION INTRAVENOUS at 09:02

## 2024-02-01 RX ADMIN — MORPHINE SULFATE 2 MG: 4 INJECTION INTRAVENOUS at 10:02

## 2024-02-01 RX ADMIN — HEPARIN SODIUM 5000 UNITS: 5000 INJECTION INTRAVENOUS; SUBCUTANEOUS at 10:02

## 2024-02-01 RX ADMIN — HYDRALAZINE HYDROCHLORIDE 100 MG: 50 TABLET ORAL at 10:02

## 2024-02-01 RX ADMIN — CARVEDILOL 12.5 MG: 12.5 TABLET, FILM COATED ORAL at 10:02

## 2024-02-01 RX ADMIN — SUCROFERRIC OXYHYDROXIDE 1000 MG: 500 TABLET, CHEWABLE ORAL at 11:02

## 2024-02-01 NOTE — FIRST PROVIDER EVALUATION
Medical screening examination initiated.  I have conducted a focused provider triage encounter, findings are as follows:    Brief history of present illness:  Pt. C/o right elbow infection at the site of a recent IV catheter, c/o sweats    There were no vitals filed for this visit.    Pertinent physical exam:  right elbow inflamed     Brief workup plan:  labs    Preliminary workup initiated; this workup will be continued and followed by the physician or advanced practice provider that is assigned to the patient when roomed.

## 2024-02-02 VITALS
WEIGHT: 208.44 LBS | HEART RATE: 60 BPM | TEMPERATURE: 98 F | RESPIRATION RATE: 20 BRPM | HEIGHT: 67 IN | BODY MASS INDEX: 32.72 KG/M2 | OXYGEN SATURATION: 95 % | SYSTOLIC BLOOD PRESSURE: 141 MMHG | DIASTOLIC BLOOD PRESSURE: 88 MMHG

## 2024-02-02 PROBLEM — E66.09 CLASS 1 OBESITY DUE TO EXCESS CALORIES WITH SERIOUS COMORBIDITY AND BODY MASS INDEX (BMI) OF 32.0 TO 32.9 IN ADULT: Status: ACTIVE | Noted: 2024-02-02

## 2024-02-02 PROBLEM — Z99.2 ESRD (END STAGE RENAL DISEASE) ON DIALYSIS: Chronic | Status: ACTIVE | Noted: 2024-02-02

## 2024-02-02 PROBLEM — N40.0 BPH (BENIGN PROSTATIC HYPERPLASIA): Status: ACTIVE | Noted: 2024-02-02

## 2024-02-02 PROBLEM — I10 HYPERTENSION: Status: ACTIVE | Noted: 2024-02-02

## 2024-02-02 PROBLEM — N18.6 ESRD (END STAGE RENAL DISEASE) ON DIALYSIS: Status: ACTIVE | Noted: 2024-02-02

## 2024-02-02 PROBLEM — I10 HYPERTENSION: Chronic | Status: ACTIVE | Noted: 2024-02-02

## 2024-02-02 PROBLEM — L03.113 CELLULITIS OF RIGHT UPPER EXTREMITY: Status: ACTIVE | Noted: 2024-02-02

## 2024-02-02 PROBLEM — N40.0 BPH (BENIGN PROSTATIC HYPERPLASIA): Chronic | Status: ACTIVE | Noted: 2024-02-02

## 2024-02-02 PROBLEM — E66.09 CLASS 1 OBESITY DUE TO EXCESS CALORIES WITH SERIOUS COMORBIDITY AND BODY MASS INDEX (BMI) OF 32.0 TO 32.9 IN ADULT: Chronic | Status: ACTIVE | Noted: 2024-02-02

## 2024-02-02 PROBLEM — Z99.2 ESRD (END STAGE RENAL DISEASE) ON DIALYSIS: Status: ACTIVE | Noted: 2024-02-02

## 2024-02-02 PROBLEM — K21.9 GERD (GASTROESOPHAGEAL REFLUX DISEASE): Chronic | Status: ACTIVE | Noted: 2024-02-02

## 2024-02-02 PROBLEM — E66.811 CLASS 1 OBESITY DUE TO EXCESS CALORIES WITH SERIOUS COMORBIDITY AND BODY MASS INDEX (BMI) OF 32.0 TO 32.9 IN ADULT: Status: ACTIVE | Noted: 2024-02-02

## 2024-02-02 PROBLEM — N18.6 ESRD (END STAGE RENAL DISEASE) ON DIALYSIS: Chronic | Status: ACTIVE | Noted: 2024-02-02

## 2024-02-02 PROBLEM — K21.9 GERD (GASTROESOPHAGEAL REFLUX DISEASE): Status: ACTIVE | Noted: 2024-02-02

## 2024-02-02 PROBLEM — E66.811 CLASS 1 OBESITY DUE TO EXCESS CALORIES WITH SERIOUS COMORBIDITY AND BODY MASS INDEX (BMI) OF 32.0 TO 32.9 IN ADULT: Chronic | Status: ACTIVE | Noted: 2024-02-02

## 2024-02-02 LAB
ALBUMIN SERPL BCP-MCNC: 3.1 G/DL (ref 3.5–5.2)
ALP SERPL-CCNC: 130 U/L (ref 55–135)
ALT SERPL W/O P-5'-P-CCNC: 40 U/L (ref 10–44)
ANION GAP SERPL CALC-SCNC: 12 MMOL/L (ref 8–16)
AST SERPL-CCNC: 54 U/L (ref 10–40)
BASOPHILS # BLD AUTO: 0.03 K/UL (ref 0–0.2)
BASOPHILS NFR BLD: 0.4 % (ref 0–1.9)
BILIRUB SERPL-MCNC: 0.9 MG/DL (ref 0.1–1)
BUN SERPL-MCNC: 59 MG/DL (ref 6–20)
CALCIUM SERPL-MCNC: 8.4 MG/DL (ref 8.7–10.5)
CHLORIDE SERPL-SCNC: 103 MMOL/L (ref 95–110)
CO2 SERPL-SCNC: 19 MMOL/L (ref 23–29)
CREAT SERPL-MCNC: 13.2 MG/DL (ref 0.5–1.4)
DIFFERENTIAL METHOD BLD: ABNORMAL
EOSINOPHIL # BLD AUTO: 0.6 K/UL (ref 0–0.5)
EOSINOPHIL NFR BLD: 7.4 % (ref 0–8)
ERYTHROCYTE [DISTWIDTH] IN BLOOD BY AUTOMATED COUNT: 15 % (ref 11.5–14.5)
EST. GFR  (NO RACE VARIABLE): 4 ML/MIN/1.73 M^2
GLUCOSE SERPL-MCNC: 91 MG/DL (ref 70–110)
HCT VFR BLD AUTO: 26.2 % (ref 40–54)
HGB BLD-MCNC: 8.8 G/DL (ref 14–18)
IMM GRANULOCYTES # BLD AUTO: 0.05 K/UL (ref 0–0.04)
IMM GRANULOCYTES NFR BLD AUTO: 0.6 % (ref 0–0.5)
LYMPHOCYTES # BLD AUTO: 0.7 K/UL (ref 1–4.8)
LYMPHOCYTES NFR BLD: 8 % (ref 18–48)
MCH RBC QN AUTO: 29.7 PG (ref 27–31)
MCHC RBC AUTO-ENTMCNC: 33.6 G/DL (ref 32–36)
MCV RBC AUTO: 89 FL (ref 82–98)
MONOCYTES # BLD AUTO: 1.1 K/UL (ref 0.3–1)
MONOCYTES NFR BLD: 13.3 % (ref 4–15)
NEUTROPHILS # BLD AUTO: 6 K/UL (ref 1.8–7.7)
NEUTROPHILS NFR BLD: 70.3 % (ref 38–73)
NRBC BLD-RTO: 0 /100 WBC
PLATELET # BLD AUTO: 97 K/UL (ref 150–450)
PMV BLD AUTO: 10.9 FL (ref 9.2–12.9)
POTASSIUM SERPL-SCNC: 4.7 MMOL/L (ref 3.5–5.1)
PROT SERPL-MCNC: 6.4 G/DL (ref 6–8.4)
RBC # BLD AUTO: 2.96 M/UL (ref 4.6–6.2)
SODIUM SERPL-SCNC: 134 MMOL/L (ref 136–145)
WBC # BLD AUTO: 8.51 K/UL (ref 3.9–12.7)

## 2024-02-02 PROCEDURE — 99900035 HC TECH TIME PER 15 MIN (STAT)

## 2024-02-02 PROCEDURE — G0378 HOSPITAL OBSERVATION PER HR: HCPCS

## 2024-02-02 PROCEDURE — 96372 THER/PROPH/DIAG INJ SC/IM: CPT | Performed by: HOSPITALIST

## 2024-02-02 PROCEDURE — 36415 COLL VENOUS BLD VENIPUNCTURE: CPT | Performed by: HOSPITALIST

## 2024-02-02 PROCEDURE — 94799 UNLISTED PULMONARY SVC/PX: CPT | Mod: XB

## 2024-02-02 PROCEDURE — 80100014 HC HEMODIALYSIS 1:1

## 2024-02-02 PROCEDURE — 25000003 PHARM REV CODE 250: Performed by: HOSPITALIST

## 2024-02-02 PROCEDURE — 80053 COMPREHEN METABOLIC PANEL: CPT | Performed by: HOSPITALIST

## 2024-02-02 PROCEDURE — 99214 OFFICE O/P EST MOD 30 MIN: CPT | Mod: ,,, | Performed by: INTERNAL MEDICINE

## 2024-02-02 PROCEDURE — G0257 UNSCHED DIALYSIS ESRD PT HOS: HCPCS

## 2024-02-02 PROCEDURE — 63600175 PHARM REV CODE 636 W HCPCS: Performed by: HOSPITALIST

## 2024-02-02 PROCEDURE — 85025 COMPLETE CBC W/AUTO DIFF WBC: CPT | Performed by: HOSPITALIST

## 2024-02-02 RX ORDER — DOXYCYCLINE 100 MG/1
100 CAPSULE ORAL EVERY 12 HOURS
Qty: 20 CAPSULE | Refills: 0 | Status: SHIPPED | OUTPATIENT
Start: 2024-02-02 | End: 2024-02-12

## 2024-02-02 RX ORDER — HYDROCODONE BITARTRATE AND ACETAMINOPHEN 5; 325 MG/1; MG/1
1 TABLET ORAL EVERY 8 HOURS PRN
Qty: 21 TABLET | Refills: 0 | Status: SHIPPED | OUTPATIENT
Start: 2024-02-02

## 2024-02-02 RX ORDER — ASPIRIN 81 MG/1
81 TABLET ORAL DAILY
Qty: 14 TABLET | Refills: 0 | Status: SHIPPED | OUTPATIENT
Start: 2024-02-02 | End: 2024-02-16

## 2024-02-02 RX ADMIN — SUCROFERRIC OXYHYDROXIDE 1000 MG: 500 TABLET, CHEWABLE ORAL at 12:02

## 2024-02-02 RX ADMIN — HYDRALAZINE HYDROCHLORIDE 100 MG: 50 TABLET ORAL at 09:02

## 2024-02-02 RX ADMIN — AMLODIPINE BESYLATE 10 MG: 10 TABLET ORAL at 09:02

## 2024-02-02 RX ADMIN — PANTOPRAZOLE SODIUM 40 MG: 40 TABLET, DELAYED RELEASE ORAL at 09:02

## 2024-02-02 RX ADMIN — HEPARIN SODIUM 5000 UNITS: 5000 INJECTION INTRAVENOUS; SUBCUTANEOUS at 06:02

## 2024-02-02 RX ADMIN — CARVEDILOL 12.5 MG: 12.5 TABLET, FILM COATED ORAL at 09:02

## 2024-02-02 RX ADMIN — HYDROCODONE BITARTRATE AND ACETAMINOPHEN 1 TABLET: 5; 325 TABLET ORAL at 09:02

## 2024-02-02 NOTE — PLAN OF CARE
[facility] - Med Surg  Discharge Assessment    Primary Care Provider: Martha Chung MD     Discharge Assessment (most recent)       BRIEF DISCHARGE ASSESSMENT - 02/02/24 1011          Discharge Planning    Assessment Type Discharge Planning Brief Assessment     Resource/Environmental Concerns none     Support Systems Parent     Equipment Currently Used at Home none     Patient/Family Anticipates Transition to home     Discharge Plan A Home

## 2024-02-02 NOTE — ASSESSMENT & PLAN NOTE
Body mass index is 32.65 kg/m². Morbid obesity complicates all aspects of disease management from diagnostic modalities to treatment. Weight loss encouraged and health benefits explained to patient.

## 2024-02-02 NOTE — PROGRESS NOTES
02/02/24 1745   Vital Signs   Temp 98.1 °F (36.7 °C)   Pulse 60   Resp 20   BP (!) 141/88   BP Location Right forearm   BP Method Automatic   Patient Position Lying   Post-Hemodialysis Assessment   Rinseback Volume (mL) 250 mL   Blood Volume Processed (Liters) 41.8 L   Dialyzer Clearance Lightly streaked   Duration of Treatment 120 minutes   Total UF (mL) 2500 mL   Net Fluid Removal 2000   Post-Hemodialysis Comments 2hr H.D. tx completed. Pt tolerated w/o issues. Pt de-accessed per P & P, report given to primary nurse.

## 2024-02-02 NOTE — ASSESSMENT & PLAN NOTE
Creatine stable for now. BMP reviewed- noted Estimated Creatinine Clearance: 7.2 mL/min (A) (based on SCr of 12.5 mg/dL (H)). according to latest data. Based on current GFR, CKD stage is end stage.  Monitor UOP and serial BMP and adjust therapy as needed. Renally dose meds. Avoid nephrotoxic medications and procedures. Nephrology consulted to resume HD inpatient via LUE fistula.

## 2024-02-02 NOTE — NURSING
"Patient admitted from ED to bed 562 @ 2338. Upon initial admit assessment, patient noted to have scabs scattered to BUE and BLE. When asked to allow primary nurse and charge nurse to perform further skin inspection of buttocks, feet, legs, patient refused. Patient stated that "yall will take my word for it, I'm not dropping my pants for nobody" Patient educated on policy, risks and benefits. Patient also refuses bed alarm. Care continues.  "

## 2024-02-02 NOTE — ED PROVIDER NOTES
"SCRIBE #1 NOTE: I, Leora Archer, am scribing for, and in the presence of, Sidney Soto MD. I have scribed the entire note.       History     Chief Complaint   Patient presents with    Arm Injury     Right upper arm swelling from "an IV he was given at [facility]" no drainage denies fevers.     Review of patient's allergies indicates:  No Known Allergies      History of Present Illness     HPI    2/1/2024, 7:17 PM  History obtained from the patient      History of Present Illness: David Guzman is a 58 y.o. male patient who presents to the Emergency Department for evaluation of right arm pain which onset in the past 2 days. Pt got an IV 2 days ago at [facility] and now is concerned the site is infected. Symptoms are constant and moderate in severity. No mitigating or exacerbating factors reported. Associated sxs include elbow pain. Patient denies any fever, chills, nausea, vomiting, and all other sxs at this time. No Prior Tx reported. No further complaints or concerns at this time.       Arrival mode: Personal vehicle  PCP: Martha Chung MD        Past Medical History:  Past Medical History:   Diagnosis Date    Alcohol abuse     Anxiety     BPH (benign prostatic hyperplasia)     Cocaine abuse     Drug abuse     ESRD (end stage renal disease) on dialysis     GERD (gastroesophageal reflux disease)     Hypertension 2/2/2024    Obesity (BMI 30-39.9)     Renal disorder     Tobacco use        Past Surgical History:  Past Surgical History:   Procedure Laterality Date    HERNIA REPAIR           Family History:  Family History   Problem Relation Age of Onset    COPD Neg Hx        Social History:  Social History     Tobacco Use    Smoking status: Former    Smokeless tobacco: Never   Substance and Sexual Activity    Alcohol use: Yes     Comment: occasionally    Drug use: Yes     Types: Cocaine    Sexual activity: Not Currently        Review of Systems     Review of Systems   Constitutional:  Negative for chills and " fever.   HENT:  Negative for sore throat.    Respiratory:  Negative for shortness of breath.    Cardiovascular:  Negative for chest pain.   Gastrointestinal:  Negative for nausea.   Genitourinary:  Negative for dysuria.   Musculoskeletal:  Positive for myalgias. Negative for back pain.   Skin:  Negative for rash.   Neurological:  Negative for weakness.   Hematological:  Does not bruise/bleed easily.   All other systems reviewed and are negative.     Physical Exam     Initial Vitals [02/01/24 1752]   BP Pulse Resp Temp SpO2   (!) 148/96 72 16 98.8 °F (37.1 °C) 96 %      MAP       --          Physical Exam  Nursing Notes and Vital Signs Reviewed.  Constitutional: Patient is in no acute distress. Well-developed and well-nourished.  Head: Atraumatic. Normocephalic.  Eyes: PERRL. EOM intact. Conjunctivae are not pale. No scleral icterus.  ENT: Mucous membranes are moist. Oropharynx is clear and symmetric.    Neck: Supple. Full ROM. No lymphadenopathy.  Cardiovascular: Regular rate. Regular rhythm. No murmurs, rubs, or gallops. Distal pulses are 2+ and symmetric.  Pulmonary/Chest: No respiratory distress. Clear to auscultation bilaterally. No wheezing or rales.  Abdominal: Soft and non-distended.  There is no tenderness.  No rebound, guarding, or rigidity. Good bowel sounds.  Genitourinary: No CVA tenderness  Musculoskeletal: Moves all extremities. No obvious deformities. No edema. No calf tenderness.  Skin: Warm and dry. Superficial ulcerations about 1 cm scattered over entire body. Area of erythema, warmth, and induration over the right antecubital fossa. No fluctuance or drainage.  Neurological:  Alert, awake, and appropriate.  Normal speech.  No acute focal neurological deficits are appreciated.  Psychiatric: Normal affect. Good eye contact. Appropriate in content.     ED Course   Procedures  ED Vital Signs:  Vitals:    02/01/24 1752 02/01/24 1910 02/01/24 2208 02/01/24 2210   BP: (!) 148/96 (!) 145/95  (!) 233/103  "  Pulse: 72 71  72   Resp: 16 17 20 18   Temp: 98.8 °F (37.1 °C) 98.7 °F (37.1 °C)     TempSrc: Oral Oral     SpO2: 96% 95%  96%   Weight: 94.6 kg (208 lb 7.1 oz)      Height:        02/01/24 2213 02/01/24 2239 02/01/24 2306 02/01/24 2355   BP: (!) 227/104 (!) 190/94 (!) 171/81 (!) 214/106   Pulse: 71 73 70 69   Resp: 19 18 18    Temp:    98.2 °F (36.8 °C)   TempSrc:       SpO2: (!) 94% 95% (!) 94% (!) 92%   Weight:       Height:        02/01/24 2358   BP: 139/84   Pulse: 70   Resp:    Temp:    TempSrc:    SpO2: (!) 91%   Weight: 94.6 kg (208 lb 7.1 oz)   Height: 5' 7" (1.702 m)       Abnormal Lab Results:  Labs Reviewed   CBC W/ AUTO DIFFERENTIAL - Abnormal; Notable for the following components:       Result Value    RBC 3.20 (*)     Hemoglobin 9.7 (*)     Hematocrit 28.7 (*)     RDW 15.4 (*)     Platelets 124 (*)     Lymph # 0.7 (*)     Mono # 1.2 (*)     Eos # 0.8 (*)     Lymph % 7.3 (*)     Eosinophil % 8.7 (*)     All other components within normal limits   COMPREHENSIVE METABOLIC PANEL - Abnormal; Notable for the following components:    Sodium 135 (*)     CO2 17 (*)     BUN 54 (*)     Creatinine 12.5 (*)     Albumin 3.2 (*)     Alkaline Phosphatase 143 (*)     AST 67 (*)     eGFR 4 (*)     All other components within normal limits   C-REACTIVE PROTEIN - Abnormal; Notable for the following components:    CRP 35.8 (*)     All other components within normal limits   LACTIC ACID, PLASMA        All Lab Results:  Results for orders placed or performed during the hospital encounter of 02/01/24   CBC auto differential   Result Value Ref Range    WBC 8.96 3.90 - 12.70 K/uL    RBC 3.20 (L) 4.60 - 6.20 M/uL    Hemoglobin 9.7 (L) 14.0 - 18.0 g/dL    Hematocrit 28.7 (L) 40.0 - 54.0 %    MCV 90 82 - 98 fL    MCH 30.3 27.0 - 31.0 pg    MCHC 33.8 32.0 - 36.0 g/dL    RDW 15.4 (H) 11.5 - 14.5 %    Platelets 124 (L) 150 - 450 K/uL    MPV 12.0 9.2 - 12.9 fL    Immature Granulocytes 0.3 0.0 - 0.5 %    Gran # (ANC) 6.2 1.8 - 7.7 " K/uL    Immature Grans (Abs) 0.03 0.00 - 0.04 K/uL    Lymph # 0.7 (L) 1.0 - 4.8 K/uL    Mono # 1.2 (H) 0.3 - 1.0 K/uL    Eos # 0.8 (H) 0.0 - 0.5 K/uL    Baso # 0.04 0.00 - 0.20 K/uL    nRBC 0 0 /100 WBC    Gran % 69.7 38.0 - 73.0 %    Lymph % 7.3 (L) 18.0 - 48.0 %    Mono % 13.6 4.0 - 15.0 %    Eosinophil % 8.7 (H) 0.0 - 8.0 %    Basophil % 0.4 0.0 - 1.9 %    Differential Method Automated    Comprehensive metabolic panel   Result Value Ref Range    Sodium 135 (L) 136 - 145 mmol/L    Potassium 4.9 3.5 - 5.1 mmol/L    Chloride 104 95 - 110 mmol/L    CO2 17 (L) 23 - 29 mmol/L    Glucose 87 70 - 110 mg/dL    BUN 54 (H) 6 - 20 mg/dL    Creatinine 12.5 (H) 0.5 - 1.4 mg/dL    Calcium 8.8 8.7 - 10.5 mg/dL    Total Protein 7.4 6.0 - 8.4 g/dL    Albumin 3.2 (L) 3.5 - 5.2 g/dL    Total Bilirubin 0.9 0.1 - 1.0 mg/dL    Alkaline Phosphatase 143 (H) 55 - 135 U/L    AST 67 (H) 10 - 40 U/L    ALT 42 10 - 44 U/L    eGFR 4 (A) >60 mL/min/1.73 m^2    Anion Gap 14 8 - 16 mmol/L   Lactic acid, plasma   Result Value Ref Range    Lactate (Lactic Acid) 0.8 0.5 - 2.2 mmol/L   C-reactive protein   Result Value Ref Range    CRP 35.8 (H) 0.0 - 8.2 mg/L         Imaging Results:  Imaging Results              US Extremity Non Vascular Limited Right (Final result)  Result time 02/01/24 21:12:27      Final result by Braulio Willams MD (02/01/24 21:12:27)                   Impression:      As above.      Electronically signed by: Braulio Willams  Date:    02/01/2024  Time:    21:12               Narrative:    EXAMINATION:  US EXTREMITY NON VASCULAR LIMITED RIGHT    CLINICAL HISTORY:  Right arm abscess?;    TECHNIQUE:  Ultrasound of the right cubital fossa    COMPARISON:  None    FINDINGS:  Extensive soft tissue edema at the region of the right elbow.  No fluid collection identified to suggestive abscess.  Thrombophlebitis distal basilic and median cubital vein.                                       X-Ray Elbow Complete Right (Final result)  Result  time 02/01/24 18:38:11      Final result by Braulio Willams MD (02/01/24 18:38:11)                   Impression:      As above      Electronically signed by: Braulio Willams  Date:    02/01/2024  Time:    18:38               Narrative:    EXAMINATION:  XR ELBOW COMPLETE 3 VIEW RIGHT    CLINICAL HISTORY:  XR ELBOW COMPLETE 3 VIEW RIGHTOther injury of unspecified body region, initial encounter    COMPARISON:  None    FINDINGS:  Multiple radiographic views  were obtained.    Olecranon spurring.  Spurring of the anterior ulnar region.  Irregularity of the posterior ulnar articulation suggestive old fracture.  Otherwise no acute fracture or dislocation.                                              The Emergency Provider reviewed the vital signs and test results, which are outlined above.     ED Discussion       9:58 PM: Discussed case with Meek Chester MD ([facility]). Dr. Chester agrees with current care and management of pt and accepts admission.   Admitting Service: [facility]  Admitting Physician: Dr. Chester  Admit to: obs med/surg    9:58 PM: Re-evaluated pt. I have discussed test results, shared treatment plan, and the need for admission with patient and family at bedside. Pt and family express understanding at this time and agree with all information. All questions answered. Pt and family have no further questions or concerns at this time. Pt is ready for admit.           Medical Decision Making  DDx includes cellulitis, abscess, sepsis    Amount and/or Complexity of Data Reviewed  Labs: ordered. Decision-making details documented in ED Course.  Radiology: ordered and independent interpretation performed. Decision-making details documented in ED Course.    Risk  Prescription drug management.                ED Medication(s):  Medications   vancomycin - pharmacy to dose (has no administration in time range)   sodium chloride 0.9% flush 10 mL (has no administration in time range)    albuterol-ipratropium 2.5 mg-0.5 mg/3 mL nebulizer solution 3 mL (has no administration in time range)   melatonin tablet 6 mg (has no administration in time range)   ondansetron injection 4 mg (4 mg Intravenous Given 2/1/24 2209)   promethazine tablet 25 mg (has no administration in time range)   senna-docusate 8.6-50 mg per tablet 1 tablet (has no administration in time range)   acetaminophen tablet 650 mg (has no administration in time range)   aluminum-magnesium hydroxide-simethicone 200-200-20 mg/5 mL suspension 30 mL (has no administration in time range)   acetaminophen suppository 650 mg (has no administration in time range)   HYDROcodone-acetaminophen 5-325 mg per tablet 1 tablet (has no administration in time range)   morphine injection 2 mg (2 mg Intravenous Given 2/1/24 2208)   naloxone 0.4 mg/mL injection 0.02 mg (has no administration in time range)   glucose chewable tablet 16 g (has no administration in time range)   glucose chewable tablet 24 g (has no administration in time range)   glucagon (human recombinant) injection 1 mg (has no administration in time range)   heparin (porcine) injection 5,000 Units (5,000 Units Subcutaneous Given 2/1/24 2209)   dextrose 10% bolus 125 mL 125 mL (has no administration in time range)   dextrose 10% bolus 250 mL 250 mL (has no administration in time range)   amLODIPine tablet 10 mg (has no administration in time range)   carvediloL tablet 12.5 mg (12.5 mg Oral Given 2/1/24 2233)   hydrALAZINE tablet 100 mg (100 mg Oral Given 2/1/24 2233)   sucroferric oxyhydroxide Chew 1,000 mg (1,000 mg Oral Given 2/1/24 2345)   pantoprazole EC tablet 40 mg (has no administration in time range)   vancomycin 2 g in dextrose 5 % 500 mL IVPB (0 mg Intravenous Stopped 2/1/24 2352)       Current Discharge Medication List                  Scribe Attestation:   Scribe #1: I performed the above scribed service and the documentation accurately describes the services I performed. I  attest to the accuracy of the note.     Attending:   Physician Attestation Statement for Scribe #1: I, Sidney Soto MD, personally performed the services described in this documentation, as scribed by Leora Archer, in my presence, and it is both accurate and complete.           Clinical Impression       ICD-10-CM ICD-9-CM   1. Wound infection  T14.8XXA 958.3    L08.9    2. Wound infection  T14.8XXA 958.3    L08.9    3. Right arm cellulitis  L03.113 682.3   4. Chest pain  R07.9 786.50       Disposition:   Disposition: Placed in Observation  Condition: Fair         Sidney Soto MD  02/02/24 043

## 2024-02-02 NOTE — CONSULTS
[facility]  Nephrology  Consult Note    Patient Name: David Guzman  MRN: [id number]  Admission Date: 2/1/2024  Hospital Length of Stay: 0 days  Attending Provider: Marco Antonio [facility] MD   Primary Care Physician: Martha Chung MD  Principal Problem:Cellulitis of right upper extremity    Inpatient consult to Nephrology  Consult performed by: Julian Baez MD  Consult ordered by: Meek Chester MD  Reason for consult: End-stage renal disease        Subjective:     HPI:  58-year-old  male with history of end-stage renal disease.  Admitted with pain redness and swelling and a previous IV site.  Nephrology has been consulted for maintenance dialysis.  Patient was seen in his hospital room.  In bed resting comfortably.  No acute distress noted.  States that he usually dialyzes on a Monday Wednesday Friday basis.  He has a left upper extremity AV fistula in place.    Past Medical History:   Diagnosis Date    Alcohol abuse     Anxiety     BPH (benign prostatic hyperplasia)     Cocaine abuse     Drug abuse     ESRD (end stage renal disease) on dialysis     GERD (gastroesophageal reflux disease)     Hypertension 2/2/2024    Obesity (BMI 30-39.9)     Renal disorder     Tobacco use        Past Surgical History:   Procedure Laterality Date    HERNIA REPAIR         Review of patient's allergies indicates:  No Known Allergies  Current Facility-Administered Medications   Medication Frequency    acetaminophen suppository 650 mg Q6H PRN    acetaminophen tablet 650 mg Q8H PRN    albuterol-ipratropium 2.5 mg-0.5 mg/3 mL nebulizer solution 3 mL Q6H PRN    aluminum-magnesium hydroxide-simethicone 200-200-20 mg/5 mL suspension 30 mL QID PRN    amLODIPine tablet 10 mg Daily    carvediloL tablet 12.5 mg BID    dextrose 10% bolus 125 mL 125 mL PRN    dextrose 10% bolus 250 mL 250 mL PRN    glucagon (human recombinant) injection 1 mg PRN    glucose chewable tablet 16 g PRN    glucose chewable tablet 24 g  PRN    heparin (porcine) injection 5,000 Units Q8H    hydrALAZINE tablet 100 mg TID    HYDROcodone-acetaminophen 5-325 mg per tablet 1 tablet Q6H PRN    melatonin tablet 6 mg Nightly PRN    morphine injection 2 mg Q4H PRN    naloxone 0.4 mg/mL injection 0.02 mg PRN    ondansetron injection 4 mg Q8H PRN    pantoprazole EC tablet 40 mg Daily    promethazine tablet 25 mg Q6H PRN    senna-docusate 8.6-50 mg per tablet 1 tablet BID PRN    sodium chloride 0.9% flush 10 mL Q12H PRN    sucroferric oxyhydroxide Chew 1,000 mg TID WM    vancomycin - pharmacy to dose pharmacy to manage frequency     Family History    None       Tobacco Use    Smoking status: Former    Smokeless tobacco: Never   Substance and Sexual Activity    Alcohol use: Yes     Comment: occasionally    Drug use: Yes     Types: Cocaine    Sexual activity: Not Currently     Review of Systems   Constitutional: Negative.    HENT: Negative.     Respiratory: Negative.     Cardiovascular: Negative.    Gastrointestinal: Negative.    Genitourinary: Negative.    Musculoskeletal: Negative.    Skin:           Describes tenderness at the previous IV site in his right antecubital fossa.     Objective:     Vital Signs (Most Recent):  Temp: 98.8 °F (37.1 °C) (02/02/24 0757)  Pulse: 68 (02/02/24 0757)  Resp: 20 (02/02/24 0921)  BP: 132/65 (02/02/24 0757)  SpO2: 98 % (02/02/24 0757) Vital Signs (24h Range):  Temp:  [98.2 °F (36.8 °C)-98.8 °F (37.1 °C)] 98.8 °F (37.1 °C)  Pulse:  [68-73] 68  Resp:  [16-20] 20  SpO2:  [91 %-98 %] 98 %  BP: (132-233)/() 132/65     Weight: 94.6 kg (208 lb 7.1 oz) (02/01/24 2358)  Body mass index is 32.65 kg/m².  Body surface area is 2.11 meters squared.    No intake/output data recorded.    Physical Exam  Constitutional:       Appearance: Normal appearance.   HENT:      Head: Normocephalic and atraumatic.   Eyes:      General: No scleral icterus.     Extraocular Movements: Extraocular movements intact.      Pupils: Pupils are equal, round,  and reactive to light.   Pulmonary:      Effort: Pulmonary effort is normal.      Breath sounds: No stridor.   Musculoskeletal:      Right lower leg: No edema.      Left lower leg: No edema.   Skin:     General: Skin is warm and dry.      Comments:   There is an area of induration in the antecubital fossa that is approximately 3 cm in diameter.  He has a well functioning left upper extremity AV fistula with excellent bruit and thrill.   Neurological:      General: No focal deficit present.      Mental Status: He is alert and oriented to person, place, and time.   Psychiatric:         Mood and Affect: Mood normal.         Behavior: Behavior normal.         Significant Labs:  BMP:   Recent Labs   Lab 02/02/24  0506   GLU 91   *   K 4.7      CO2 19*   BUN 59*   CREATININE 13.2*   CALCIUM 8.4*     CMP:   Recent Labs   Lab 02/02/24  0506   GLU 91   CALCIUM 8.4*   ALBUMIN 3.1*   PROT 6.4   *   K 4.7   CO2 19*      BUN 59*   CREATININE 13.2*   ALKPHOS 130   ALT 40   AST 54*   BILITOT 0.9     All labs within the past 24 hours have been reviewed.    Significant Imaging:  Labs: Reviewed      Assessment/Plan:     Active Diagnoses:    Diagnosis Date Noted POA    PRINCIPAL PROBLEM:  Cellulitis of right upper extremity [L03.113] 02/02/2024 Yes    Hypertension [I10] 02/02/2024 Yes     Chronic    ESRD (end stage renal disease) on dialysis [N18.6, Z99.2] 02/02/2024 Not Applicable     Chronic    BPH (benign prostatic hyperplasia) [N40.0] 02/02/2024 Yes     Chronic    GERD (gastroesophageal reflux disease) [K21.9] 02/02/2024 Yes     Chronic    Class 1 obesity due to excess calories with serious comorbidity and body mass index (BMI) of 32.0 to 32.9 in adult [E66.09, Z68.32] 02/02/2024 Not Applicable     Chronic      Problems Resolved During this Admission:         Assessment and plan:      1. End-stage renal disease:  Will plan dialysis today.  Will plan to continue Monday Wednesday Friday schedule while he is  in the hospital.      Discussed with dialysis staff.  Orders in place.      2.  Electrolytes: Potassium is stable 4.7.      3.  Acid-base:  Serum bicarbonate is slightly low at 19.  This should improve with dialysis.      4. Volume:  He does not have any edema.  He relates that he has not had very much to eat or drink over the past several days.  He feels like that we could try for approximately 2 L ultrafiltration.  Will monitor closely.    Thank you for your consult.     Julian Baez MD  Nephrology  [facility]

## 2024-02-02 NOTE — PLAN OF CARE
Discussed poc with pt, pt verbalized understanding  Pt completed dialysis. Pt discharged to home. No sign of acute distress or discomfort.   Purposeful rounding every 2hours    VS wnl  Fall precautions in place, remains injury free  Pt denies pain or discomfort  Pain and nausea under control with PRN meds    IVFs Pt SL removed intact and pressure applied to site    Accurate I&Os    Abx given as prescribed    Bed locked at lowest position    Call light within reach    Chart check complete  Will cont with POC

## 2024-02-02 NOTE — PLAN OF CARE
Discussed poc with pt, pt verbalized understanding    Purposeful rounding every 2hours    VS wnl  Fall precautions in place, remains injury free  Pt denies pain or discomfort  Pain and nausea under control with PRN meds    IVFs    Accurate I&Os  Abx given as prescribed  Bed locked at lowest position  Call light within reach    Chart check complete  Will cont with POC

## 2024-02-02 NOTE — PROGRESS NOTES
"Pharmacokinetic Initial Assessment: IV Vancomycin    Assessment/Plan:    Initiate intravenous vancomycin with loading dose of 2000 mg once with subsequent doses when random concentrations are less than 20 mcg/mL  Desired empiric serum trough concentration is 10 to 15 mcg/mL  Draw vancomycin random level on 2/5 at 0600.  Pharmacy will continue to follow and monitor vancomycin.      Please contact pharmacy at extension [phone] with any questions regarding this assessment.     Thank you for the consult,   Latrice Bahena       Patient brief summary:  David Guzman is a 58 y.o. male initiated on antimicrobial therapy with IV Vancomycin for treatment of suspected skin & soft tissue infection    Drug Allergies:   Review of patient's allergies indicates:  No Known Allergies    Actual Body Weight:   94.6kg    Renal Function:   Estimated Creatinine Clearance: 6.8 mL/min (A) (based on SCr of 13.2 mg/dL (H)).,     Dialysis Method (if applicable):  N/A    CBC (last 72 hours):  Recent Labs   Lab Result Units 02/01/24  1834 02/02/24  0506   WBC K/uL 8.96 8.51   Hemoglobin g/dL 9.7* 8.8*   Hematocrit % 28.7* 26.2*   Platelets K/uL 124* 97*   Gran % % 69.7 70.3   Lymph % % 7.3* 8.0*   Mono % % 13.6 13.3   Eosinophil % % 8.7* 7.4   Basophil % % 0.4 0.4   Differential Method  Automated Automated       Metabolic Panel (last 72 hours):  Recent Labs   Lab Result Units 02/01/24  1834 02/02/24  0506   Sodium mmol/L 135* 134*   Potassium mmol/L 4.9 4.7   Chloride mmol/L 104 103   CO2 mmol/L 17* 19*   Glucose mg/dL 87 91   BUN mg/dL 54* 59*   Creatinine mg/dL 12.5* 13.2*   Albumin g/dL 3.2* 3.1*   Total Bilirubin mg/dL 0.9 0.9   Alkaline Phosphatase U/L 143* 130   AST U/L 67* 54*   ALT U/L 42 40       Drug levels (last 3 results):  No results for input(s): "VANCOMYCINRA", "VANCORANDOM", "VANCOMYCINPE", "VANCOPEAK", "VANCOMYCINTR", "VANCOTROUGH" in the last 72 hours.    Microbiologic Results:  Microbiology Results (last 7 days)       " Procedure Component Value Units Date/Time    Blood culture #1 **CANNOT BE ORDERED STAT** [[id number]] Collected: 02/01/24 1835    Order Status: Completed Specimen: Blood from Peripheral, Hand, Right Updated: 02/02/24 0915     Blood Culture, Routine No Growth to date    Blood culture #2 **CANNOT BE ORDERED STAT** [[id number]] Collected: 02/01/24 1835    Order Status: Completed Specimen: Blood from Peripheral, Antecubital, Right Updated: 02/02/24 0915     Blood Culture, Routine No Growth to date

## 2024-02-02 NOTE — HOSPITAL COURSE
2/2/24  NAEON, pain controlled, RUE cellulitis improving  US with no fluid collection or abscess noted  Blood cultures NG pending  Improving with vancomycin  S/p HD today via LUE graft  Overall stable for hospital discharge with course of PO antibiotics  Doxycycline 100 mg PO BID x 10 days

## 2024-02-02 NOTE — ASSESSMENT & PLAN NOTE
Not currently on outpatient medication due to no longer making urine.  Plan:  -continue HD per nephrology

## 2024-02-02 NOTE — H&P
"[facility]  History & Physical    Patient Name: David Guzman  MRN: [id number]  Patient Class: OP- Observation  Admission Date: 2/1/2024  Attending Physician: Meek Chester MD   Primary Care Provider: Martha Chung MD         Patient information was obtained from patient, past medical records, and ER records.     Subjective:     Principal Problem:Cellulitis of right upper extremity    Chief Complaint:   Chief Complaint   Patient presents with    Arm Injury     Right upper arm swelling from "an IV he was given at [facility]" no drainage denies fevers.        HPI: David Guzman is a 58 y.o. male with a PMH  has a past medical history of Alcohol abuse, Anxiety, BPH (benign prostatic hyperplasia), Cocaine abuse, Drug abuse, ESRD (end stage renal disease) on dialysis, GERD (gastroesophageal reflux disease), Obesity (BMI 30-39.9), Renal disorder, and Tobacco use. who presented to the ED for further evaluation of worsening right upper extremity pain and swollen x2 days duration.  Patient was recently admitted to [facility] from 1/27 to 1/29 4 uremic encephalopathy after missing hemodialysis due to being incarcerated.  Patient stated he was stuck multiple times during his previous admission and his IV went bad causing his current symptoms.  He is currently endorsing diffuse pain and swelling throughout his entire right upper extremity described as tight/swollen, rated 7/10 in severity, with no known alleviating factors, and aggravating factors including movement and palpation to the affected area.  He denied endorsing any numbness, tingling, or muscle weakness however did report numerous ulcerations throughout all extremities and top half of his trunk as well as bilateral lower extremity edema.  He denied endorsing any lightheadedness, dizziness, headache, visual changes, fever, chills, sweats, nausea, vomiting, chest pain, shortness of breath, abdominal pain, melena, hematochezia, " diarrhea, or onset neurological deficits.  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  Patient currently follows [facility] in [place] and currently on M/W/F HD via LUE fistula with last session completed back on Monday.  Prior to onset of symptoms, patient reported being in his usual state of health with no other concerns or complaints.  All other review of systems negative except as noted above.  Initial workup in the ED consistent with patient on HD for ESRD in his currently afebrile without leukocytosis.  CRP elevated at 35.8.  Patient initiated on vancomycin with cultures obtained and pending.  Patient admitted to Hospital Medicine under observation for continued medical management.      PCP: Martha Chung      Past Medical History:   Diagnosis Date    Alcohol abuse     Anxiety     BPH (benign prostatic hyperplasia)     Cocaine abuse     Drug abuse     ESRD (end stage renal disease) on dialysis     GERD (gastroesophageal reflux disease)     Obesity (BMI 30-39.9)     Renal disorder     Tobacco use        Past Surgical History:   Procedure Laterality Date    HERNIA REPAIR         Review of patient's allergies indicates:  No Known Allergies    No current facility-administered medications on file prior to encounter.     Current Outpatient Medications on File Prior to Encounter   Medication Sig    amLODIPine (NORVASC) 10 MG tablet Take 1 tablet by mouth once daily.    carvediloL (COREG) 12.5 MG tablet Take 1 tablet by mouth 2 (two) times daily.    tamsulosin (FLOMAX) 0.4 mg Cap Take 0.4 mg by mouth once daily.    VELPHORO 500 mg Chew Take 2 tablets by mouth 3 (three) times daily with meals.    alprazolam (XANAX) 0.25 MG tablet Take 1 tablet (0.25 mg total) by mouth nightly as needed for Anxiety.    hydrALAZINE (APRESOLINE) 100 MG tablet Take 100 mg by mouth 3 (three) times daily.    hydrOXYzine pamoate (VISTARIL) 25 MG Cap Take 1  capsule (25 mg total) by mouth 4 (four) times daily.    ibuprofen (ADVIL,MOTRIN) 800 MG tablet Take 1 tablet (800 mg total) by mouth every 6 (six) hours as needed for Pain.    ketorolac (TORADOL) 10 mg tablet Take 1 tablet (10 mg total) by mouth every 6 (six) hours as needed for Pain.    oxycodone-acetaminophen (PERCOCET) 5-325 mg per tablet Take 1-2 tablets by mouth every 4 (four) hours as needed for Pain.    predniSONE (DELTASONE) 10 MG tablet Take 6 on day 1, 5 on day 2, 4 on day 3, 3 on day 4, 2 on day 5, and 1 on day 6    traMADoL (ULTRAM) 50 mg tablet Take 1 tablet (50 mg total) by mouth every 6 (six) hours as needed for Pain.    traMADoL (ULTRAM) 50 mg tablet Take 1 tablet (50 mg total) by mouth every 6 (six) hours as needed.     Family History    None       Tobacco Use    Smoking status: Former    Smokeless tobacco: Never   Substance and Sexual Activity    Alcohol use: Yes     Comment: occasionally    Drug use: Yes     Types: Cocaine    Sexual activity: Not Currently     Review of Systems   All other systems reviewed and are negative.    Objective:     Vital Signs (Most Recent):  Temp: 98.2 °F (36.8 °C) (02/01/24 2355)  Pulse: 70 (02/01/24 2358)  Resp: 18 (02/01/24 2306)  BP: 139/84 (02/01/24 2358)  SpO2: (!) 91 % (02/01/24 2358) Vital Signs (24h Range):  Temp:  [98.2 °F (36.8 °C)-98.8 °F (37.1 °C)] 98.2 °F (36.8 °C)  Pulse:  [69-73] 70  Resp:  [16-20] 18  SpO2:  [91 %-96 %] 91 %  BP: (139-233)/() 139/84     Weight: 94.6 kg (208 lb 7.1 oz)  Body mass index is 32.65 kg/m².     Physical Exam  Vitals reviewed.   Constitutional:       General: He is in acute distress.      Appearance: Normal appearance. He is obese. He is not ill-appearing, toxic-appearing or diaphoretic.   HENT:      Head: Normocephalic and atraumatic.      Right Ear: External ear normal.      Left Ear: External ear normal.      Nose: Nose normal. No congestion or rhinorrhea.      Mouth/Throat:      Mouth: Mucous membranes are moist.       Pharynx: Oropharynx is clear. No oropharyngeal exudate or posterior oropharyngeal erythema.   Eyes:      General: No scleral icterus.     Extraocular Movements: Extraocular movements intact.      Conjunctiva/sclera: Conjunctivae normal.      Pupils: Pupils are equal, round, and reactive to light.   Neck:      Vascular: No carotid bruit.   Cardiovascular:      Rate and Rhythm: Normal rate and regular rhythm.      Pulses: Normal pulses.      Heart sounds: Normal heart sounds. No murmur heard.     No friction rub. No gallop.   Pulmonary:      Effort: Pulmonary effort is normal. No respiratory distress.      Breath sounds: Normal breath sounds. No stridor. No wheezing, rhonchi or rales.   Chest:      Chest wall: No tenderness.   Abdominal:      General: Abdomen is flat. Bowel sounds are normal. There is no distension.      Palpations: Abdomen is soft. There is no mass.      Tenderness: There is no abdominal tenderness. There is no guarding or rebound.      Hernia: No hernia is present.   Musculoskeletal:         General: Swelling present. No tenderness, deformity or signs of injury. Normal range of motion.      Cervical back: Normal range of motion and neck supple. No rigidity or tenderness.      Right lower leg: Edema present.      Left lower leg: Edema present.      Comments: LUE fistula noted with palpable thrill and bruit present   Lymphadenopathy:      Cervical: No cervical adenopathy.   Skin:     General: Skin is warm and dry.      Capillary Refill: Capillary refill takes less than 2 seconds.      Coloration: Skin is not jaundiced or pale.      Findings: Erythema, lesion and rash present. No bruising.      Comments: Right upper extremity with diffuse swelling, erythema, warmth, and TTP throughout.  Patient also noted to have numerous ulcerations throughout all extremities and throughout top half of trunk.   Neurological:      General: No focal deficit present.      Mental Status: He is alert and oriented to  person, place, and time. Mental status is at baseline.      Cranial Nerves: No cranial nerve deficit.      Sensory: No sensory deficit.      Motor: No weakness.      Coordination: Coordination normal.   Psychiatric:         Mood and Affect: Mood normal.         Behavior: Behavior normal.         Thought Content: Thought content normal.         Judgment: Judgment normal.              CRANIAL NERVES     CN III, IV, VI   Pupils are equal, round, and reactive to light.       Significant Labs: All pertinent labs within the past 24 hours have been reviewed.    Significant Imaging: I have reviewed all pertinent imaging results/findings within the past 24 hours.    LABS:  Recent Results (from the past 24 hour(s))   CBC auto differential    Collection Time: 02/01/24  6:34 PM   Result Value Ref Range    WBC 8.96 3.90 - 12.70 K/uL    RBC 3.20 (L) 4.60 - 6.20 M/uL    Hemoglobin 9.7 (L) 14.0 - 18.0 g/dL    Hematocrit 28.7 (L) 40.0 - 54.0 %    MCV 90 82 - 98 fL    MCH 30.3 27.0 - 31.0 pg    MCHC 33.8 32.0 - 36.0 g/dL    RDW 15.4 (H) 11.5 - 14.5 %    Platelets 124 (L) 150 - 450 K/uL    MPV 12.0 9.2 - 12.9 fL    Immature Granulocytes 0.3 0.0 - 0.5 %    Gran # (ANC) 6.2 1.8 - 7.7 K/uL    Immature Grans (Abs) 0.03 0.00 - 0.04 K/uL    Lymph # 0.7 (L) 1.0 - 4.8 K/uL    Mono # 1.2 (H) 0.3 - 1.0 K/uL    Eos # 0.8 (H) 0.0 - 0.5 K/uL    Baso # 0.04 0.00 - 0.20 K/uL    nRBC 0 0 /100 WBC    Gran % 69.7 38.0 - 73.0 %    Lymph % 7.3 (L) 18.0 - 48.0 %    Mono % 13.6 4.0 - 15.0 %    Eosinophil % 8.7 (H) 0.0 - 8.0 %    Basophil % 0.4 0.0 - 1.9 %    Differential Method Automated    Comprehensive metabolic panel    Collection Time: 02/01/24  6:34 PM   Result Value Ref Range    Sodium 135 (L) 136 - 145 mmol/L    Potassium 4.9 3.5 - 5.1 mmol/L    Chloride 104 95 - 110 mmol/L    CO2 17 (L) 23 - 29 mmol/L    Glucose 87 70 - 110 mg/dL    BUN 54 (H) 6 - 20 mg/dL    Creatinine 12.5 (H) 0.5 - 1.4 mg/dL    Calcium 8.8 8.7 - 10.5 mg/dL    Total Protein 7.4  6.0 - 8.4 g/dL    Albumin 3.2 (L) 3.5 - 5.2 g/dL    Total Bilirubin 0.9 0.1 - 1.0 mg/dL    Alkaline Phosphatase 143 (H) 55 - 135 U/L    AST 67 (H) 10 - 40 U/L    ALT 42 10 - 44 U/L    eGFR 4 (A) >60 mL/min/1.73 m^2    Anion Gap 14 8 - 16 mmol/L   Lactic acid, plasma    Collection Time: 02/01/24  6:34 PM   Result Value Ref Range    Lactate (Lactic Acid) 0.8 0.5 - 2.2 mmol/L   C-reactive protein    Collection Time: 02/01/24  6:34 PM   Result Value Ref Range    CRP 35.8 (H) 0.0 - 8.2 mg/L       RADIOLOGY  US Extremity Non Vascular Limited Right    Result Date: 2/1/2024  EXAMINATION: US EXTREMITY NON VASCULAR LIMITED RIGHT CLINICAL HISTORY: Right arm abscess?; TECHNIQUE: Ultrasound of the right cubital fossa COMPARISON: None FINDINGS: Extensive soft tissue edema at the region of the right elbow.  No fluid collection identified to suggestive abscess.  Thrombophlebitis distal basilic and median cubital vein.     As above. Electronically signed by: Braulio Willams Date:    02/01/2024 Time:    21:12    X-Ray Elbow Complete Right    Result Date: 2/1/2024  EXAMINATION: XR ELBOW COMPLETE 3 VIEW RIGHT CLINICAL HISTORY: XR ELBOW COMPLETE 3 VIEW RIGHTOther injury of unspecified body region, initial encounter COMPARISON: None FINDINGS: Multiple radiographic views  were obtained. Olecranon spurring.  Spurring of the anterior ulnar region.  Irregularity of the posterior ulnar articulation suggestive old fracture.  Otherwise no acute fracture or dislocation.     As above Electronically signed by: Braulio Willams Date:    02/01/2024 Time:    18:38    CT Head Without Contrast    Result Date: 1/27/2024  CT HEAD WO CONTRAST CLINICAL INDICATION: Mental status change,  unknown cause COMPARISON: None. TECHNIQUE: A CT scan of the brain was performed without IV contrast.  Automated exposure control was used for dose reduction. FINDINGS: No CT evidence of acute cortical-based infarction, intracranial hemorrhage, mass effect, hydrocephalus, or  abnormal extra-axial fluid collection. Basilar cisterns are preserved. No evidence of depressed calvarial fracture. Impression: No CT evidence of an acute intracranial process.    X-Ray Chest 1 View    Result Date: 1/27/2024  XR CHEST 1 VIEW CLINICAL INDICATION: shortness of breath COMPARISON: None. TECHNIQUE: A single AP radiographic view of the chest was obtained. FINDINGS: There is mild midthoracic dextroscoliosis.    1.  Significant decrease in the lung volumes since the previous study, with some crowding of the bronchovascular markings at both lung bases and probable mild bibasilar atelectasis, with or without an associated small left pleural effusion and blunting of the left lateral costophrenic sulcus. 2.  Cardiomegaly which has increased in size since the previous study, with widening of the superior mediastinum, probably vascular in nature. 3.  No evidence of any pulmonary edema or pneumonia.    X-Ray Chest 1 View    Result Date: 1/19/2024  XR CHEST 1 VIEW HISTORY:  dyspnea COMPARISON: 10/5/2023 TECHNIQUE: AP portable chest radiograph FINDINGS: The heart is enlarged. No overt pulmonary edema. The lungs are clear. There is no pneumothorax or pleural effusion.      EKG    MICROBIOLOGY    MDM    Assessment/Plan:     * Cellulitis of right upper extremity  Patient presented with worsening pain and swelling of right upper extremity x2 days duration concerning for underlying cellulitis.  Ultrasound negative for fluid collection/abscess but did reveal extensive soft tissue swelling/edema.  Patient also with multiple ulceration noted throughout. He remains afebrile without leukocytosis with CRP elevated at 35.8.  Patient initiated on vancomycin with cultures obtained and pending.  Plan:  -Continue current pain regimen, titrate as needed  -Bowel regimen  -Bedrest  -Wound care   -None weightbearing  -IVFs prn  -Antiemetics prn  -Tylenol as needed for fever   -Continue antibiotics  -f/u cultures  -Consider further  imaging and/or surgical consult pending clinical response       ESRD (end stage renal disease) on dialysis  Creatine stable for now. BMP reviewed- noted Estimated Creatinine Clearance: 7.2 mL/min (A) (based on SCr of 12.5 mg/dL (H)). according to latest data. Based on current GFR, CKD stage is end stage.  Monitor UOP and serial BMP and adjust therapy as needed. Renally dose meds. Avoid nephrotoxic medications and procedures. Nephrology consulted to resume HD inpatient via LUE fistula.       Hypertension  Chronic, controlled. Latest blood pressure and vitals reviewed-     Temp:  [98.2 °F (36.8 °C)-98.8 °F (37.1 °C)]   Pulse:  [69-73]   Resp:  [16-20]   BP: (139-233)/()   SpO2:  [91 %-96 %] .   Home meds for hypertension were reviewed and noted below.   Hypertension Medications               amLODIPine (NORVASC) 10 MG tablet Take 1 tablet by mouth once daily.    carvediloL (COREG) 12.5 MG tablet Take 1 tablet by mouth 2 (two) times daily.    hydrALAZINE (APRESOLINE) 100 MG tablet Take 100 mg by mouth 3 (three) times daily.          While in the hospital, will manage blood pressure as follows; Continue home antihypertensive regimen    Will utilize p.r.n. blood pressure medication only if patient's blood pressure greater than 160/100 and he develops symptoms such as worsening chest pain or shortness of breath.      GERD (gastroesophageal reflux disease)  Chronic. Stable. Currently asymptomatic. Home medications include PPI/Antacids as needed.  Plan:  -Continue PPI/Antacids as needed       BPH (benign prostatic hyperplasia)  Not currently on outpatient medication due to no longer making urine.  Plan:  -continue HD per nephrology       Class 1 obesity due to excess calories with serious comorbidity and body mass index (BMI) of 32.0 to 32.9 in adult  Body mass index is 32.65 kg/m². Morbid obesity complicates all aspects of disease management from diagnostic modalities to treatment. Weight loss encouraged and health  benefits explained to patient.         VTE Risk Mitigation (From admission, onward)           Ordered     heparin (porcine) injection 5,000 Units  Every 8 hours         02/01/24 2153     IP VTE LOW RISK PATIENT  Once         02/01/24 2153     Place sequential compression device  Until discontinued         02/01/24 2153                  //Core Measures   -DVT proph: SCDs, subQ heparin   -Code status: Full    -Surrogate: none provided       Components of this note were documented using a voice recognition system and are subject to errors not corrected at the time the document was proof read. Please contact the author for any clarifications.        On 02/01/2024, patient should be placed in hospital observation services under my care.      Pharmacokinetic Initial Assessment: IV Vancomycin    Assessment/Plan:    Initiate intravenous vancomycin with loading dose of 2000 mg once with subsequent doses when random concentrations are less than 20 mcg/mL  Desired empiric serum trough concentration is 10 to 20 mcg/mL  Ordering vancomycin random is pending nephrology consult/HD plans with the plan to order the random prior to the second dialysis session after vancomycin initiation as per policy.   Pharmacy will continue to follow and monitor vancomycin.      Please contact pharmacy at extension [phone] with any questions regarding this assessment.     Thank you for the consult,   Lian Connors       Patient brief summary:  David Guzman is a 58 y.o. male initiated on antimicrobial therapy with IV Vancomycin for treatment of suspected skin & soft tissue infection    Drug Allergies:   Review of patient's allergies indicates:  No Known Allergies    Actual Body Weight:   94.6 kg    Renal Function:   CrCl cannot be calculated (Unknown ideal weight.).,     Dialysis Method (if applicable):  intermittent HD    CBC (last 72 hours):  Recent Labs   Lab Result Units 02/01/24  1834   WBC K/uL 8.96   Hemoglobin g/dL 9.7*   Hematocrit %  "28.7*   Platelets K/uL 124*   Gran % % 69.7   Lymph % % 7.3*   Mono % % 13.6   Eosinophil % % 8.7*   Basophil % % 0.4   Differential Method  Automated       Metabolic Panel (last 72 hours):  Recent Labs   Lab Result Units 02/01/24  1834   Sodium mmol/L 135*   Potassium mmol/L 4.9   Chloride mmol/L 104   CO2 mmol/L 17*   Glucose mg/dL 87   BUN mg/dL 54*   Creatinine mg/dL 12.5*   Albumin g/dL 3.2*   Total Bilirubin mg/dL 0.9   Alkaline Phosphatase U/L 143*   AST U/L 67*   ALT U/L 42       Drug levels (last 3 results):  No results for input(s): "VANCOMYCINRA", "VANCORANDOM", "VANCOMYCINPE", "VANCOPEAK", "VANCOMYCINTR", "VANCOTROUGH" in the last 72 hours.    Microbiologic Results:  Microbiology Results (last 7 days)       Procedure Component Value Units Date/Time    Blood culture #1 **CANNOT BE ORDERED STAT** [[id number]] Collected: 02/01/24 1835    Order Status: Sent Specimen: Blood from Peripheral, Hand, Right Updated: 02/02/24 0122    Blood culture #2 **CANNOT BE ORDERED STAT** [[id number]] Collected: 02/01/24 1835    Order Status: Sent Specimen: Blood from Peripheral, Antecubital, Right Updated: 02/02/24 0122            Meek Chester MD  Department of Hospital Medicine  '[facility]          "

## 2024-02-02 NOTE — ASSESSMENT & PLAN NOTE
Chronic, controlled. Latest blood pressure and vitals reviewed-     Temp:  [98.2 °F (36.8 °C)-98.8 °F (37.1 °C)]   Pulse:  [69-73]   Resp:  [16-20]   BP: (139-233)/()   SpO2:  [91 %-96 %] .   Home meds for hypertension were reviewed and noted below.   Hypertension Medications               amLODIPine (NORVASC) 10 MG tablet Take 1 tablet by mouth once daily.    carvediloL (COREG) 12.5 MG tablet Take 1 tablet by mouth 2 (two) times daily.    hydrALAZINE (APRESOLINE) 100 MG tablet Take 100 mg by mouth 3 (three) times daily.          While in the hospital, will manage blood pressure as follows; Continue home antihypertensive regimen    Will utilize p.r.n. blood pressure medication only if patient's blood pressure greater than 160/100 and he develops symptoms such as worsening chest pain or shortness of breath.

## 2024-02-02 NOTE — PROGRESS NOTES
"Pharmacokinetic Initial Assessment: IV Vancomycin    Assessment/Plan:    Initiate intravenous vancomycin with loading dose of 2000 mg once with subsequent doses when random concentrations are less than 20 mcg/mL  Desired empiric serum trough concentration is 10 to 20 mcg/mL  Ordering vancomycin random is pending nephrology consult/HD plans with the plan to order the random prior to the second dialysis session after vancomycin initiation as per policy.   Pharmacy will continue to follow and monitor vancomycin.      Please contact pharmacy at extension [phone] with any questions regarding this assessment.     Thank you for the consult,   Lian Connors       Patient brief summary:  David Guzman is a 58 y.o. male initiated on antimicrobial therapy with IV Vancomycin for treatment of suspected skin & soft tissue infection    Drug Allergies:   Review of patient's allergies indicates:  No Known Allergies    Actual Body Weight:   94.6 kg    Renal Function:   CrCl cannot be calculated (Unknown ideal weight.).,     Dialysis Method (if applicable):  intermittent HD    CBC (last 72 hours):  Recent Labs   Lab Result Units 02/01/24  1834   WBC K/uL 8.96   Hemoglobin g/dL 9.7*   Hematocrit % 28.7*   Platelets K/uL 124*   Gran % % 69.7   Lymph % % 7.3*   Mono % % 13.6   Eosinophil % % 8.7*   Basophil % % 0.4   Differential Method  Automated       Metabolic Panel (last 72 hours):  Recent Labs   Lab Result Units 02/01/24  1834   Sodium mmol/L 135*   Potassium mmol/L 4.9   Chloride mmol/L 104   CO2 mmol/L 17*   Glucose mg/dL 87   BUN mg/dL 54*   Creatinine mg/dL 12.5*   Albumin g/dL 3.2*   Total Bilirubin mg/dL 0.9   Alkaline Phosphatase U/L 143*   AST U/L 67*   ALT U/L 42       Drug levels (last 3 results):  No results for input(s): "VANCOMYCINRA", "VANCORANDOM", "VANCOMYCINPE", "VANCOPEAK", "VANCOMYCINTR", "VANCOTROUGH" in the last 72 hours.    Microbiologic Results:  Microbiology Results (last 7 days)       Procedure " Component Value Units Date/Time    Blood culture #1 **CANNOT BE ORDERED STAT** [[id number]] Collected: 02/01/24 1835    Order Status: Sent Specimen: Blood from Peripheral, Hand, Right Updated: 02/02/24 0122    Blood culture #2 **CANNOT BE ORDERED STAT** [[id number]] Collected: 02/01/24 1835    Order Status: Sent Specimen: Blood from Peripheral, Antecubital, Right Updated: 02/02/24 0122

## 2024-02-02 NOTE — ASSESSMENT & PLAN NOTE
Patient presented with worsening pain and swelling of right upper extremity x2 days duration concerning for underlying cellulitis.  Ultrasound negative for fluid collection/abscess but did reveal extensive soft tissue swelling/edema.  Patient also with multiple ulceration noted throughout. He remains afebrile without leukocytosis with CRP elevated at 35.8.  Patient initiated on vancomycin with cultures obtained and pending.  Plan:  -Continue current pain regimen, titrate as needed  -Bowel regimen  -Bedrest  -Wound care   -None weightbearing  -IVFs prn  -Antiemetics prn  -Tylenol as needed for fever   -Continue antibiotics  -f/u cultures  -Consider further imaging and/or surgical consult pending clinical response

## 2024-02-02 NOTE — HPI
David Guzman is a 58 y.o. male with a PMH  has a past medical history of Alcohol abuse, Anxiety, BPH (benign prostatic hyperplasia), Cocaine abuse, Drug abuse, ESRD (end stage renal disease) on dialysis, GERD (gastroesophageal reflux disease), Obesity (BMI 30-39.9), Renal disorder, and Tobacco use. who presented to the ED for further evaluation of worsening right upper extremity pain and swollen x2 days duration.  Patient was recently admitted to [facility] from 1/27 to 1/29 4 uremic encephalopathy after missing hemodialysis due to being incarcerated.  Patient stated he was stuck multiple times during his previous admission and his IV went bad causing his current symptoms.  He is currently endorsing diffuse pain and swelling throughout his entire right upper extremity described as tight/swollen, rated 7/10 in severity, with no known alleviating factors, and aggravating factors including movement and palpation to the affected area.  He denied endorsing any numbness, tingling, or muscle weakness however did report numerous ulcerations throughout all extremities and top half of his trunk as well as bilateral lower extremity edema.  He denied endorsing any lightheadedness, dizziness, headache, visual changes, fever, chills, sweats, nausea, vomiting, chest pain, shortness of breath, abdominal pain, melena, hematochezia, diarrhea, or onset neurological deficits.  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  Patient currently follows [facility] in [place] and currently on M/W/F HD via LUE fistula with last session completed back on Monday.  Prior to onset of symptoms, patient reported being in his usual state of health with no other concerns or complaints.  All other review of systems negative except as noted above.  Initial workup in the ED consistent with patient on HD for ESRD in his currently afebrile without leukocytosis.  CRP elevated  at 35.8.  Patient initiated on vancomycin with cultures obtained and pending.  Patient admitted to Hospital Medicine under observation for continued medical management.      PCP: Martha Chung

## 2024-02-02 NOTE — SUBJECTIVE & OBJECTIVE
Past Medical History:   Diagnosis Date    Alcohol abuse     Anxiety     BPH (benign prostatic hyperplasia)     Cocaine abuse     Drug abuse     ESRD (end stage renal disease) on dialysis     GERD (gastroesophageal reflux disease)     Obesity (BMI 30-39.9)     Renal disorder     Tobacco use        Past Surgical History:   Procedure Laterality Date    HERNIA REPAIR         Review of patient's allergies indicates:  No Known Allergies    No current facility-administered medications on file prior to encounter.     Current Outpatient Medications on File Prior to Encounter   Medication Sig    amLODIPine (NORVASC) 10 MG tablet Take 1 tablet by mouth once daily.    carvediloL (COREG) 12.5 MG tablet Take 1 tablet by mouth 2 (two) times daily.    tamsulosin (FLOMAX) 0.4 mg Cap Take 0.4 mg by mouth once daily.    VELPHORO 500 mg Chew Take 2 tablets by mouth 3 (three) times daily with meals.    alprazolam (XANAX) 0.25 MG tablet Take 1 tablet (0.25 mg total) by mouth nightly as needed for Anxiety.    hydrALAZINE (APRESOLINE) 100 MG tablet Take 100 mg by mouth 3 (three) times daily.    hydrOXYzine pamoate (VISTARIL) 25 MG Cap Take 1 capsule (25 mg total) by mouth 4 (four) times daily.    ibuprofen (ADVIL,MOTRIN) 800 MG tablet Take 1 tablet (800 mg total) by mouth every 6 (six) hours as needed for Pain.    ketorolac (TORADOL) 10 mg tablet Take 1 tablet (10 mg total) by mouth every 6 (six) hours as needed for Pain.    oxycodone-acetaminophen (PERCOCET) 5-325 mg per tablet Take 1-2 tablets by mouth every 4 (four) hours as needed for Pain.    predniSONE (DELTASONE) 10 MG tablet Take 6 on day 1, 5 on day 2, 4 on day 3, 3 on day 4, 2 on day 5, and 1 on day 6    traMADoL (ULTRAM) 50 mg tablet Take 1 tablet (50 mg total) by mouth every 6 (six) hours as needed for Pain.    traMADoL (ULTRAM) 50 mg tablet Take 1 tablet (50 mg total) by mouth every 6 (six) hours as needed.     Family History    None       Tobacco Use    Smoking status: Former     Smokeless tobacco: Never   Substance and Sexual Activity    Alcohol use: Yes     Comment: occasionally    Drug use: Yes     Types: Cocaine    Sexual activity: Not Currently     Review of Systems   All other systems reviewed and are negative.    Objective:     Vital Signs (Most Recent):  Temp: 98.2 °F (36.8 °C) (02/01/24 2355)  Pulse: 70 (02/01/24 2358)  Resp: 18 (02/01/24 2306)  BP: 139/84 (02/01/24 2358)  SpO2: (!) 91 % (02/01/24 2358) Vital Signs (24h Range):  Temp:  [98.2 °F (36.8 °C)-98.8 °F (37.1 °C)] 98.2 °F (36.8 °C)  Pulse:  [69-73] 70  Resp:  [16-20] 18  SpO2:  [91 %-96 %] 91 %  BP: (139-233)/() 139/84     Weight: 94.6 kg (208 lb 7.1 oz)  Body mass index is 32.65 kg/m².     Physical Exam  Vitals reviewed.   Constitutional:       General: He is in acute distress.      Appearance: Normal appearance. He is obese. He is not ill-appearing, toxic-appearing or diaphoretic.   HENT:      Head: Normocephalic and atraumatic.      Right Ear: External ear normal.      Left Ear: External ear normal.      Nose: Nose normal. No congestion or rhinorrhea.      Mouth/Throat:      Mouth: Mucous membranes are moist.      Pharynx: Oropharynx is clear. No oropharyngeal exudate or posterior oropharyngeal erythema.   Eyes:      General: No scleral icterus.     Extraocular Movements: Extraocular movements intact.      Conjunctiva/sclera: Conjunctivae normal.      Pupils: Pupils are equal, round, and reactive to light.   Neck:      Vascular: No carotid bruit.   Cardiovascular:      Rate and Rhythm: Normal rate and regular rhythm.      Pulses: Normal pulses.      Heart sounds: Normal heart sounds. No murmur heard.     No friction rub. No gallop.   Pulmonary:      Effort: Pulmonary effort is normal. No respiratory distress.      Breath sounds: Normal breath sounds. No stridor. No wheezing, rhonchi or rales.   Chest:      Chest wall: No tenderness.   Abdominal:      General: Abdomen is flat. Bowel sounds are normal. There is no  distension.      Palpations: Abdomen is soft. There is no mass.      Tenderness: There is no abdominal tenderness. There is no guarding or rebound.      Hernia: No hernia is present.   Musculoskeletal:         General: Swelling present. No tenderness, deformity or signs of injury. Normal range of motion.      Cervical back: Normal range of motion and neck supple. No rigidity or tenderness.      Right lower leg: Edema present.      Left lower leg: Edema present.      Comments: LUE fistula noted with palpable thrill and bruit present   Lymphadenopathy:      Cervical: No cervical adenopathy.   Skin:     General: Skin is warm and dry.      Capillary Refill: Capillary refill takes less than 2 seconds.      Coloration: Skin is not jaundiced or pale.      Findings: Erythema, lesion and rash present. No bruising.      Comments: Right upper extremity with diffuse swelling, erythema, warmth, and TTP throughout.  Patient also noted to have numerous ulcerations throughout all extremities and throughout top half of trunk.   Neurological:      General: No focal deficit present.      Mental Status: He is alert and oriented to person, place, and time. Mental status is at baseline.      Cranial Nerves: No cranial nerve deficit.      Sensory: No sensory deficit.      Motor: No weakness.      Coordination: Coordination normal.   Psychiatric:         Mood and Affect: Mood normal.         Behavior: Behavior normal.         Thought Content: Thought content normal.         Judgment: Judgment normal.              CRANIAL NERVES     CN III, IV, VI   Pupils are equal, round, and reactive to light.       Significant Labs: All pertinent labs within the past 24 hours have been reviewed.    Significant Imaging: I have reviewed all pertinent imaging results/findings within the past 24 hours.    LABS:  Recent Results (from the past 24 hour(s))   CBC auto differential    Collection Time: 02/01/24  6:34 PM   Result Value Ref Range    WBC 8.96 3.90 -  12.70 K/uL    RBC 3.20 (L) 4.60 - 6.20 M/uL    Hemoglobin 9.7 (L) 14.0 - 18.0 g/dL    Hematocrit 28.7 (L) 40.0 - 54.0 %    MCV 90 82 - 98 fL    MCH 30.3 27.0 - 31.0 pg    MCHC 33.8 32.0 - 36.0 g/dL    RDW 15.4 (H) 11.5 - 14.5 %    Platelets 124 (L) 150 - 450 K/uL    MPV 12.0 9.2 - 12.9 fL    Immature Granulocytes 0.3 0.0 - 0.5 %    Gran # (ANC) 6.2 1.8 - 7.7 K/uL    Immature Grans (Abs) 0.03 0.00 - 0.04 K/uL    Lymph # 0.7 (L) 1.0 - 4.8 K/uL    Mono # 1.2 (H) 0.3 - 1.0 K/uL    Eos # 0.8 (H) 0.0 - 0.5 K/uL    Baso # 0.04 0.00 - 0.20 K/uL    nRBC 0 0 /100 WBC    Gran % 69.7 38.0 - 73.0 %    Lymph % 7.3 (L) 18.0 - 48.0 %    Mono % 13.6 4.0 - 15.0 %    Eosinophil % 8.7 (H) 0.0 - 8.0 %    Basophil % 0.4 0.0 - 1.9 %    Differential Method Automated    Comprehensive metabolic panel    Collection Time: 02/01/24  6:34 PM   Result Value Ref Range    Sodium 135 (L) 136 - 145 mmol/L    Potassium 4.9 3.5 - 5.1 mmol/L    Chloride 104 95 - 110 mmol/L    CO2 17 (L) 23 - 29 mmol/L    Glucose 87 70 - 110 mg/dL    BUN 54 (H) 6 - 20 mg/dL    Creatinine 12.5 (H) 0.5 - 1.4 mg/dL    Calcium 8.8 8.7 - 10.5 mg/dL    Total Protein 7.4 6.0 - 8.4 g/dL    Albumin 3.2 (L) 3.5 - 5.2 g/dL    Total Bilirubin 0.9 0.1 - 1.0 mg/dL    Alkaline Phosphatase 143 (H) 55 - 135 U/L    AST 67 (H) 10 - 40 U/L    ALT 42 10 - 44 U/L    eGFR 4 (A) >60 mL/min/1.73 m^2    Anion Gap 14 8 - 16 mmol/L   Lactic acid, plasma    Collection Time: 02/01/24  6:34 PM   Result Value Ref Range    Lactate (Lactic Acid) 0.8 0.5 - 2.2 mmol/L   C-reactive protein    Collection Time: 02/01/24  6:34 PM   Result Value Ref Range    CRP 35.8 (H) 0.0 - 8.2 mg/L       RADIOLOGY  US Extremity Non Vascular Limited Right    Result Date: 2/1/2024  EXAMINATION: US EXTREMITY NON VASCULAR LIMITED RIGHT CLINICAL HISTORY: Right arm abscess?; TECHNIQUE: Ultrasound of the right cubital fossa COMPARISON: None FINDINGS: Extensive soft tissue edema at the region of the right elbow.  No fluid  collection identified to suggestive abscess.  Thrombophlebitis distal basilic and median cubital vein.     As above. Electronically signed by: Braulio Willams Date:    02/01/2024 Time:    21:12    X-Ray Elbow Complete Right    Result Date: 2/1/2024  EXAMINATION: XR ELBOW COMPLETE 3 VIEW RIGHT CLINICAL HISTORY: XR ELBOW COMPLETE 3 VIEW RIGHTOther injury of unspecified body region, initial encounter COMPARISON: None FINDINGS: Multiple radiographic views  were obtained. Olecranon spurring.  Spurring of the anterior ulnar region.  Irregularity of the posterior ulnar articulation suggestive old fracture.  Otherwise no acute fracture or dislocation.     As above Electronically signed by: Braulio Willams Date:    02/01/2024 Time:    18:38    CT Head Without Contrast    Result Date: 1/27/2024  CT HEAD WO CONTRAST CLINICAL INDICATION: Mental status change,  unknown cause COMPARISON: None. TECHNIQUE: A CT scan of the brain was performed without IV contrast.  Automated exposure control was used for dose reduction. FINDINGS: No CT evidence of acute cortical-based infarction, intracranial hemorrhage, mass effect, hydrocephalus, or abnormal extra-axial fluid collection. Basilar cisterns are preserved. No evidence of depressed calvarial fracture. Impression: No CT evidence of an acute intracranial process.    X-Ray Chest 1 View    Result Date: 1/27/2024  XR CHEST 1 VIEW CLINICAL INDICATION: shortness of breath COMPARISON: None. TECHNIQUE: A single AP radiographic view of the chest was obtained. FINDINGS: There is mild midthoracic dextroscoliosis.    1.  Significant decrease in the lung volumes since the previous study, with some crowding of the bronchovascular markings at both lung bases and probable mild bibasilar atelectasis, with or without an associated small left pleural effusion and blunting of the left lateral costophrenic sulcus. 2.  Cardiomegaly which has increased in size since the previous study, with widening of the  superior mediastinum, probably vascular in nature. 3.  No evidence of any pulmonary edema or pneumonia.    X-Ray Chest 1 View    Result Date: 1/19/2024  XR CHEST 1 VIEW HISTORY:  dyspnea COMPARISON: 10/5/2023 TECHNIQUE: AP portable chest radiograph FINDINGS: The heart is enlarged. No overt pulmonary edema. The lungs are clear. There is no pneumothorax or pleural effusion.      EKG    MICROBIOLOGY    MDM

## 2024-02-02 NOTE — PLAN OF CARE
[facility] - Med Surg  Discharge Final Note    Primary Care Provider: Martha Chung MD    Expected Discharge Date: 2/2/2024    Final Discharge Note (most recent)       Final Note - 02/02/24 1235          Final Note    Assessment Type Final Discharge Note     Anticipated Discharge Disposition Home or Self Care                                Contact Info       Martha Chung MD   Specialty: Internal Medicine   Relationship: PCP - General    [address] [place] [id number]   Phone: [phone]       Next Steps: Schedule an appointment as soon as possible for a visit in 1 week(s)    Instructions: Hospital discharge follow up

## 2024-02-02 NOTE — DISCHARGE SUMMARY
[facility] Medicine  Discharge Summary      Patient Name: David Guzman  MRN: [id number]  LEAH: [id number]  Patient Class: OP- Observation  Admission Date: 2/1/2024  Hospital Length of Stay: 0 days  Discharge Date and Time:  02/02/2024 12:29 PM  Attending Physician: Marco Antonio Toledo MD   Discharging Provider: Marco Antonio Toledo MD  Primary Care Provider: Martha Chung MD    Primary Care Team: Networked reference to record PCT     HPI:   David Guzman is a 58 y.o. male with a PMH  has a past medical history of Alcohol abuse, Anxiety, BPH (benign prostatic hyperplasia), Cocaine abuse, Drug abuse, ESRD (end stage renal disease) on dialysis, GERD (gastroesophageal reflux disease), Obesity (BMI 30-39.9), Renal disorder, and Tobacco use. who presented to the ED for further evaluation of worsening right upper extremity pain and swollen x2 days duration.  Patient was recently admitted to [facility] from 1/27 to 1/29 4 uremic encephalopathy after missing hemodialysis due to being incarcerated.  Patient stated he was stuck multiple times during his previous admission and his IV went bad causing his current symptoms.  He is currently endorsing diffuse pain and swelling throughout his entire right upper extremity described as tight/swollen, rated 7/10 in severity, with no known alleviating factors, and aggravating factors including movement and palpation to the affected area.  He denied endorsing any numbness, tingling, or muscle weakness however did report numerous ulcerations throughout all extremities and top half of his trunk as well as bilateral lower extremity edema.  He denied endorsing any lightheadedness, dizziness, headache, visual changes, fever, chills, sweats, nausea, vomiting, chest pain, shortness of breath, abdominal pain, melena, hematochezia, diarrhea, or onset neurological deficits.  Patient reports making scant amount of urine since initiating dialysis back in 7/23 due to complications  from Goodpasture Syndrome.  Patient currently follows [facility] in [place] and currently on M/W/F HD via LUE fistula with last session completed back on Monday.  Prior to onset of symptoms, patient reported being in his usual state of health with no other concerns or complaints.  All other review of systems negative except as noted above.  Initial workup in the ED consistent with patient on HD for ESRD in his currently afebrile without leukocytosis.  CRP elevated at 35.8.  Patient initiated on vancomycin with cultures obtained and pending.  Patient admitted to Hospital Medicine under observation for continued medical management.      PCP: Martha Chung      * No surgery found *      Hospital Course:   2/2/24  NAEON, pain controlled, RUE cellulitis improving  US with no fluid collection or abscess noted  Blood cultures NG pending  Improving with vancomycin  S/p HD today via LUE graft  Overall stable for hospital discharge with course of PO antibiotics  Doxycycline 100 mg PO BID x 10 days     Goals of Care Treatment Preferences:  Code Status: Full Code      Consults:   Consults (From admission, onward)          Status Ordering Provider     Inpatient consult to Nephrology  Once        Provider:  Julina Baez MD    Completed FELICITAS WALKER     Pharmacy to dose Vancomycin consult  Once        Provider:  (Not yet assigned)   See [facility] for full Linked Orders Report.    Acknowledged CRISTOBAL MCKENZIE            No new Assessment & Plan notes have been filed under this hospital service since the last note was generated.  Service: Hospital Medicine    Final Active Diagnoses:    Diagnosis Date Noted POA    PRINCIPAL PROBLEM:  Cellulitis of right upper extremity [L03.113] 02/02/2024 Yes    Hypertension [I10] 02/02/2024 Yes     Chronic    ESRD (end stage renal disease) on dialysis [N18.6, Z99.2] 02/02/2024 Not Applicable     Chronic    BPH (benign prostatic hyperplasia) [N40.0] 02/02/2024 Yes     Chronic    GERD  (gastroesophageal reflux disease) [K21.9] 02/02/2024 Yes     Chronic    Class 1 obesity due to excess calories with serious comorbidity and body mass index (BMI) of 32.0 to 32.9 in adult [E66.09, Z68.32] 02/02/2024 Not Applicable     Chronic      Problems Resolved During this Admission:       Discharged Condition: stable    Disposition: Home or Self Care    Follow Up:   Follow-up Information       Martha Chung MD. Schedule an appointment as soon as possible for a visit in 1 week(s).    Specialty: Internal Medicine  Why: Hospital discharge follow up  Contact information:  [address] [facility] [address] [phone]                           Patient Instructions:      Activity as tolerated       Significant Diagnostic Studies: Labs: All labs within the past 24 hours have been reviewed    Pending Diagnostic Studies:       None           Medications:  Reconciled Home Medications:      Medication List        START taking these medications      aspirin 81 MG EC tablet  Commonly known as: ECOTRIN  Take 1 tablet (81 mg total) by mouth once daily. for 14 days     doxycycline 100 MG Cap  Commonly known as: VIBRAMYCIN  Take 1 capsule (100 mg total) by mouth every 12 (twelve) hours. for 10 days     HYDROcodone-acetaminophen 5-325 mg per tablet  Commonly known as: NORCO  Take 1 tablet by mouth every 8 (eight) hours as needed for Pain.            CONTINUE taking these medications      amLODIPine 10 MG tablet  Commonly known as: NORVASC  Take 1 tablet by mouth once daily.     carvediloL 12.5 MG tablet  Commonly known as: COREG  Take 1 tablet by mouth 2 (two) times daily.     hydrALAZINE 100 MG tablet  Commonly known as: APRESOLINE  Take 100 mg by mouth 3 (three) times daily.     tamsulosin 0.4 mg Cap  Commonly known as: FLOMAX  Take 0.4 mg by mouth once daily.     VELPHORO 500 mg Chew  Generic drug: sucroferric oxyhydroxide  Take 2 tablets by mouth 3 (three) times daily with meals.            STOP taking these  medications      ALPRAZolam 0.25 MG tablet  Commonly known as: XANAX     hydrOXYzine pamoate 25 MG Cap  Commonly known as: VISTARIL     ibuprofen 800 MG tablet  Commonly known as: ADVIL,MOTRIN     ketorolac 10 mg tablet  Commonly known as: TORADOL     oxyCODONE-acetaminophen 5-325 mg per tablet  Commonly known as: PERCOCET     predniSONE 10 MG tablet  Commonly known as: DELTASONE     traMADoL 50 mg tablet  Commonly known as: ULTRAM              Indwelling Lines/Drains at time of discharge:   Lines/Drains/Airways       None                   Time spent on the discharge of patient: 40 minutes         Marco Antonio Toledo MD  Department of Hospital Medicine  [facility] - Med Surg

## 2024-02-07 LAB
BACTERIA BLD CULT: NORMAL
BACTERIA BLD CULT: NORMAL

## 2024-10-12 ENCOUNTER — HOSPITAL ENCOUNTER (INPATIENT)
Facility: HOSPITAL | Age: 59
LOS: 3 days | Discharge: HOME | DRG: 640 | End: 2024-10-15
Attending: EMERGENCY MEDICINE | Admitting: INTERNAL MEDICINE
Payer: MEDICAID

## 2024-10-12 DIAGNOSIS — R76.8 HEPATITIS C ANTIBODY POSITIVE IN BLOOD: ICD-10-CM

## 2024-10-12 DIAGNOSIS — N19 HYPERPHOSPHATEMIA ASSOCIATED WITH RENAL FAILURE: ICD-10-CM

## 2024-10-12 DIAGNOSIS — R07.9 CHEST PAIN: ICD-10-CM

## 2024-10-12 DIAGNOSIS — N18.6 ESRD NEEDING DIALYSIS: ICD-10-CM

## 2024-10-12 DIAGNOSIS — E83.39 HYPERPHOSPHATEMIA ASSOCIATED WITH RENAL FAILURE: ICD-10-CM

## 2024-10-12 DIAGNOSIS — Z99.2 END STAGE RENAL DISEASE ON DIALYSIS: Primary | ICD-10-CM

## 2024-10-12 DIAGNOSIS — Z91.199 NONCOMPLIANCE: ICD-10-CM

## 2024-10-12 DIAGNOSIS — R79.89 ELEVATED TROPONIN I LEVEL: ICD-10-CM

## 2024-10-12 DIAGNOSIS — N18.6 END STAGE RENAL DISEASE ON DIALYSIS: Primary | ICD-10-CM

## 2024-10-12 DIAGNOSIS — I10 PRIMARY HYPERTENSION: ICD-10-CM

## 2024-10-12 DIAGNOSIS — R06.02 SOB (SHORTNESS OF BREATH): ICD-10-CM

## 2024-10-12 DIAGNOSIS — E87.5 HYPERKALEMIA: ICD-10-CM

## 2024-10-12 DIAGNOSIS — I21.4 NSTEMI (NON-ST ELEVATED MYOCARDIAL INFARCTION): ICD-10-CM

## 2024-10-12 DIAGNOSIS — J96.01 ACUTE HYPOXEMIC RESPIRATORY FAILURE: ICD-10-CM

## 2024-10-12 DIAGNOSIS — I07.1 TRICUSPID VALVE INSUFFICIENCY, UNSPECIFIED ETIOLOGY: ICD-10-CM

## 2024-10-12 DIAGNOSIS — Z99.2 ESRD NEEDING DIALYSIS: ICD-10-CM

## 2024-10-12 PROBLEM — R21 FACIAL RASH: Status: ACTIVE | Noted: 2024-10-12

## 2024-10-12 PROBLEM — E66.811 OBESITY (BMI 30.0-34.9): Status: ACTIVE | Noted: 2024-10-12

## 2024-10-12 PROBLEM — D64.9 NORMOCYTIC ANEMIA: Status: ACTIVE | Noted: 2024-10-12

## 2024-10-12 PROBLEM — E87.70 VOLUME OVERLOAD: Status: ACTIVE | Noted: 2024-10-12

## 2024-10-12 PROBLEM — D69.6 THROMBOCYTOPENIA: Status: ACTIVE | Noted: 2024-10-12

## 2024-10-12 PROBLEM — R94.31 ABNORMAL EKG: Status: ACTIVE | Noted: 2024-10-12

## 2024-10-12 PROBLEM — F19.11 H/O: SUBSTANCE ABUSE: Status: ACTIVE | Noted: 2024-10-12

## 2024-10-12 PROBLEM — R74.8 ELEVATED ALKALINE PHOSPHATASE LEVEL: Status: ACTIVE | Noted: 2024-10-12

## 2024-10-12 LAB
ALBUMIN SERPL BCP-MCNC: 3.6 G/DL (ref 3.5–5.2)
ALP SERPL-CCNC: 203 U/L (ref 55–135)
ALT SERPL W/O P-5'-P-CCNC: 34 U/L (ref 10–44)
ANION GAP SERPL CALC-SCNC: 22 MMOL/L (ref 8–16)
AST SERPL-CCNC: 39 U/L (ref 10–40)
BASOPHILS # BLD AUTO: 0.03 K/UL (ref 0–0.2)
BASOPHILS NFR BLD: 0.5 % (ref 0–1.9)
BILIRUB SERPL-MCNC: 0.9 MG/DL (ref 0.1–1)
BNP SERPL-MCNC: 2973 PG/ML (ref 0–99)
BUN SERPL-MCNC: 84 MG/DL (ref 6–20)
CALCIUM SERPL-MCNC: 8.8 MG/DL (ref 8.7–10.5)
CHLORIDE SERPL-SCNC: 96 MMOL/L (ref 95–110)
CO2 SERPL-SCNC: 19 MMOL/L (ref 23–29)
CREAT SERPL-MCNC: 16.8 MG/DL (ref 0.5–1.4)
DIFFERENTIAL METHOD BLD: ABNORMAL
EOSINOPHIL # BLD AUTO: 0.5 K/UL (ref 0–0.5)
EOSINOPHIL NFR BLD: 7.3 % (ref 0–8)
ERYTHROCYTE [DISTWIDTH] IN BLOOD BY AUTOMATED COUNT: 17.4 % (ref 11.5–14.5)
EST. GFR  (NO RACE VARIABLE): 3 ML/MIN/1.73 M^2
GLUCOSE SERPL-MCNC: 89 MG/DL (ref 70–110)
HCT VFR BLD AUTO: 27.5 % (ref 40–54)
HGB BLD-MCNC: 9.4 G/DL (ref 14–18)
IMM GRANULOCYTES # BLD AUTO: 0.03 K/UL (ref 0–0.04)
IMM GRANULOCYTES NFR BLD AUTO: 0.5 % (ref 0–0.5)
LYMPHOCYTES # BLD AUTO: 0.7 K/UL (ref 1–4.8)
LYMPHOCYTES NFR BLD: 10.6 % (ref 18–48)
MAGNESIUM SERPL-MCNC: 2.4 MG/DL (ref 1.6–2.6)
MCH RBC QN AUTO: 31.9 PG (ref 27–31)
MCHC RBC AUTO-ENTMCNC: 34.2 G/DL (ref 32–36)
MCV RBC AUTO: 93 FL (ref 82–98)
MONOCYTES # BLD AUTO: 1.2 K/UL (ref 0.3–1)
MONOCYTES NFR BLD: 19.1 % (ref 4–15)
NEUTROPHILS # BLD AUTO: 3.9 K/UL (ref 1.8–7.7)
NEUTROPHILS NFR BLD: 62 % (ref 38–73)
NRBC BLD-RTO: 0 /100 WBC
PHOSPHATE SERPL-MCNC: 9.1 MG/DL (ref 2.7–4.5)
PLATELET # BLD AUTO: 101 K/UL (ref 150–450)
PMV BLD AUTO: 11.2 FL (ref 9.2–12.9)
POTASSIUM SERPL-SCNC: 5.4 MMOL/L (ref 3.5–5.1)
PROT SERPL-MCNC: 7.5 G/DL (ref 6–8.4)
RBC # BLD AUTO: 2.95 M/UL (ref 4.6–6.2)
SODIUM SERPL-SCNC: 137 MMOL/L (ref 136–145)
TROPONIN I SERPL DL<=0.01 NG/ML-MCNC: 0.1 NG/ML (ref 0–0.03)
WBC # BLD AUTO: 6.33 K/UL (ref 3.9–12.7)

## 2024-10-12 PROCEDURE — 99291 CRITICAL CARE FIRST HOUR: CPT

## 2024-10-12 PROCEDURE — 80053 COMPREHEN METABOLIC PANEL: CPT | Performed by: EMERGENCY MEDICINE

## 2024-10-12 PROCEDURE — 63600175 PHARM REV CODE 636 W HCPCS: Performed by: INTERNAL MEDICINE

## 2024-10-12 PROCEDURE — 84443 ASSAY THYROID STIM HORMONE: CPT | Performed by: INTERNAL MEDICINE

## 2024-10-12 PROCEDURE — 85610 PROTHROMBIN TIME: CPT | Performed by: INTERNAL MEDICINE

## 2024-10-12 PROCEDURE — 83880 ASSAY OF NATRIURETIC PEPTIDE: CPT | Performed by: EMERGENCY MEDICINE

## 2024-10-12 PROCEDURE — 84484 ASSAY OF TROPONIN QUANT: CPT | Performed by: INTERNAL MEDICINE

## 2024-10-12 PROCEDURE — 85025 COMPLETE CBC W/AUTO DIFF WBC: CPT | Performed by: EMERGENCY MEDICINE

## 2024-10-12 PROCEDURE — 93005 ELECTROCARDIOGRAM TRACING: CPT

## 2024-10-12 PROCEDURE — 84439 ASSAY OF FREE THYROXINE: CPT | Performed by: INTERNAL MEDICINE

## 2024-10-12 PROCEDURE — 83605 ASSAY OF LACTIC ACID: CPT | Performed by: INTERNAL MEDICINE

## 2024-10-12 PROCEDURE — 82977 ASSAY OF GGT: CPT | Performed by: INTERNAL MEDICINE

## 2024-10-12 PROCEDURE — 11000001 HC ACUTE MED/SURG PRIVATE ROOM

## 2024-10-12 PROCEDURE — 82077 ASSAY SPEC XCP UR&BREATH IA: CPT | Performed by: INTERNAL MEDICINE

## 2024-10-12 PROCEDURE — 83615 LACTATE (LD) (LDH) ENZYME: CPT | Performed by: INTERNAL MEDICINE

## 2024-10-12 PROCEDURE — 82746 ASSAY OF FOLIC ACID SERUM: CPT | Performed by: INTERNAL MEDICINE

## 2024-10-12 PROCEDURE — 84484 ASSAY OF TROPONIN QUANT: CPT | Mod: 91 | Performed by: EMERGENCY MEDICINE

## 2024-10-12 PROCEDURE — 83540 ASSAY OF IRON: CPT | Performed by: INTERNAL MEDICINE

## 2024-10-12 PROCEDURE — 25000003 PHARM REV CODE 250: Performed by: INTERNAL MEDICINE

## 2024-10-12 PROCEDURE — 83010 ASSAY OF HAPTOGLOBIN QUANT: CPT | Performed by: INTERNAL MEDICINE

## 2024-10-12 PROCEDURE — 82728 ASSAY OF FERRITIN: CPT | Performed by: INTERNAL MEDICINE

## 2024-10-12 PROCEDURE — 84100 ASSAY OF PHOSPHORUS: CPT | Performed by: EMERGENCY MEDICINE

## 2024-10-12 PROCEDURE — 83735 ASSAY OF MAGNESIUM: CPT | Performed by: EMERGENCY MEDICINE

## 2024-10-12 PROCEDURE — 82607 VITAMIN B-12: CPT | Performed by: INTERNAL MEDICINE

## 2024-10-12 PROCEDURE — 87389 HIV-1 AG W/HIV-1&-2 AB AG IA: CPT | Performed by: INTERNAL MEDICINE

## 2024-10-12 PROCEDURE — 80074 ACUTE HEPATITIS PANEL: CPT | Performed by: INTERNAL MEDICINE

## 2024-10-12 PROCEDURE — 93010 ELECTROCARDIOGRAM REPORT: CPT | Mod: ,,, | Performed by: STUDENT IN AN ORGANIZED HEALTH CARE EDUCATION/TRAINING PROGRAM

## 2024-10-12 PROCEDURE — 25000003 PHARM REV CODE 250: Performed by: EMERGENCY MEDICINE

## 2024-10-12 RX ORDER — HEPARIN SODIUM 5000 [USP'U]/ML
5000 INJECTION, SOLUTION INTRAVENOUS; SUBCUTANEOUS EVERY 8 HOURS
Status: DISCONTINUED | OUTPATIENT
Start: 2024-10-13 | End: 2024-10-15 | Stop reason: HOSPADM

## 2024-10-12 RX ORDER — HYDRALAZINE HYDROCHLORIDE 50 MG/1
100 TABLET, FILM COATED ORAL 3 TIMES DAILY
Status: DISCONTINUED | OUTPATIENT
Start: 2024-10-13 | End: 2024-10-15 | Stop reason: HOSPADM

## 2024-10-12 RX ORDER — IPRATROPIUM BROMIDE AND ALBUTEROL SULFATE 2.5; .5 MG/3ML; MG/3ML
3 SOLUTION RESPIRATORY (INHALATION) EVERY 6 HOURS PRN
Status: DISCONTINUED | OUTPATIENT
Start: 2024-10-13 | End: 2024-10-15 | Stop reason: HOSPADM

## 2024-10-12 RX ORDER — HYDRALAZINE HYDROCHLORIDE 20 MG/ML
10 INJECTION INTRAMUSCULAR; INTRAVENOUS EVERY 6 HOURS PRN
Status: DISCONTINUED | OUTPATIENT
Start: 2024-10-13 | End: 2024-10-15 | Stop reason: HOSPADM

## 2024-10-12 RX ORDER — HYDROXYZINE HYDROCHLORIDE 25 MG/1
25 TABLET, FILM COATED ORAL 3 TIMES DAILY PRN
Status: DISCONTINUED | OUTPATIENT
Start: 2024-10-12 | End: 2024-10-15 | Stop reason: HOSPADM

## 2024-10-12 RX ORDER — IBUPROFEN 200 MG
24 TABLET ORAL
Status: DISCONTINUED | OUTPATIENT
Start: 2024-10-13 | End: 2024-10-15 | Stop reason: HOSPADM

## 2024-10-12 RX ORDER — FUROSEMIDE 10 MG/ML
80 INJECTION INTRAMUSCULAR; INTRAVENOUS ONCE
Status: COMPLETED | OUTPATIENT
Start: 2024-10-12 | End: 2024-10-12

## 2024-10-12 RX ORDER — ACETAMINOPHEN 325 MG/1
650 TABLET ORAL EVERY 6 HOURS PRN
Status: DISCONTINUED | OUTPATIENT
Start: 2024-10-13 | End: 2024-10-15 | Stop reason: HOSPADM

## 2024-10-12 RX ORDER — SODIUM CHLORIDE 0.9 % (FLUSH) 0.9 %
10 SYRINGE (ML) INJECTION EVERY 12 HOURS PRN
Status: DISCONTINUED | OUTPATIENT
Start: 2024-10-13 | End: 2024-10-15 | Stop reason: HOSPADM

## 2024-10-12 RX ORDER — NALOXONE HCL 0.4 MG/ML
0.02 VIAL (ML) INJECTION
Status: DISCONTINUED | OUTPATIENT
Start: 2024-10-13 | End: 2024-10-15 | Stop reason: HOSPADM

## 2024-10-12 RX ORDER — IBUPROFEN 200 MG
16 TABLET ORAL
Status: DISCONTINUED | OUTPATIENT
Start: 2024-10-13 | End: 2024-10-15 | Stop reason: HOSPADM

## 2024-10-12 RX ORDER — AMLODIPINE BESYLATE 10 MG/1
10 TABLET ORAL DAILY
Status: DISCONTINUED | OUTPATIENT
Start: 2024-10-13 | End: 2024-10-15 | Stop reason: HOSPADM

## 2024-10-12 RX ORDER — ONDANSETRON HYDROCHLORIDE 2 MG/ML
4 INJECTION, SOLUTION INTRAVENOUS EVERY 6 HOURS PRN
Status: DISCONTINUED | OUTPATIENT
Start: 2024-10-13 | End: 2024-10-15 | Stop reason: HOSPADM

## 2024-10-12 RX ORDER — CALCIUM GLUCONATE 20 MG/ML
2 INJECTION, SOLUTION INTRAVENOUS
Status: DISCONTINUED | OUTPATIENT
Start: 2024-10-12 | End: 2024-10-12

## 2024-10-12 RX ORDER — HYDRALAZINE HYDROCHLORIDE 25 MG/1
100 TABLET, FILM COATED ORAL 3 TIMES DAILY
Status: DISCONTINUED | OUTPATIENT
Start: 2024-10-12 | End: 2024-10-12

## 2024-10-12 RX ORDER — CARVEDILOL 12.5 MG/1
12.5 TABLET ORAL ONCE
Status: COMPLETED | OUTPATIENT
Start: 2024-10-12 | End: 2024-10-12

## 2024-10-12 RX ORDER — TAMSULOSIN HYDROCHLORIDE 0.4 MG/1
0.4 CAPSULE ORAL DAILY
Status: DISCONTINUED | OUTPATIENT
Start: 2024-10-13 | End: 2024-10-15 | Stop reason: HOSPADM

## 2024-10-12 RX ORDER — CARVEDILOL 12.5 MG/1
12.5 TABLET ORAL 2 TIMES DAILY
Status: DISCONTINUED | OUTPATIENT
Start: 2024-10-13 | End: 2024-10-15 | Stop reason: HOSPADM

## 2024-10-12 RX ORDER — DEXTROSE 50 % IN WATER (D50W) INTRAVENOUS SYRINGE
25
Status: DISCONTINUED | OUTPATIENT
Start: 2024-10-12 | End: 2024-10-12

## 2024-10-12 RX ORDER — ASPIRIN 81 MG/1
81 TABLET ORAL DAILY
Status: DISCONTINUED | OUTPATIENT
Start: 2024-10-13 | End: 2024-10-15 | Stop reason: HOSPADM

## 2024-10-12 RX ORDER — ALBUTEROL SULFATE 0.83 MG/ML
10 SOLUTION RESPIRATORY (INHALATION)
Status: DISCONTINUED | OUTPATIENT
Start: 2024-10-12 | End: 2024-10-12

## 2024-10-12 RX ORDER — HYDRALAZINE HYDROCHLORIDE 25 MG/1
100 TABLET, FILM COATED ORAL ONCE
Status: COMPLETED | OUTPATIENT
Start: 2024-10-12 | End: 2024-10-12

## 2024-10-12 RX ORDER — AMLODIPINE BESYLATE 5 MG/1
10 TABLET ORAL
Status: COMPLETED | OUTPATIENT
Start: 2024-10-12 | End: 2024-10-12

## 2024-10-12 RX ORDER — GLUCAGON 1 MG
1 KIT INJECTION
Status: DISCONTINUED | OUTPATIENT
Start: 2024-10-13 | End: 2024-10-15 | Stop reason: HOSPADM

## 2024-10-12 RX ADMIN — HYDRALAZINE HYDROCHLORIDE 100 MG: 25 TABLET ORAL at 09:10

## 2024-10-12 RX ADMIN — FUROSEMIDE 80 MG: 10 INJECTION, SOLUTION INTRAMUSCULAR; INTRAVENOUS at 11:10

## 2024-10-12 RX ADMIN — SODIUM ZIRCONIUM CYCLOSILICATE 10 G: 5 POWDER, FOR SUSPENSION ORAL at 11:10

## 2024-10-12 RX ADMIN — AMLODIPINE BESYLATE 10 MG: 5 TABLET ORAL at 09:10

## 2024-10-12 RX ADMIN — CARVEDILOL 12.5 MG: 12.5 TABLET, FILM COATED ORAL at 09:10

## 2024-10-12 NOTE — Clinical Note
Diagnosis: End stage renal disease on dialysis [950435]   Reason for IP Medical Treatment  (Clinical interventions that can only be accomplished in the IP setting? ) :: dialysis

## 2024-10-13 LAB
ALBUMIN SERPL BCP-MCNC: 3.5 G/DL (ref 3.5–5.2)
ALP SERPL-CCNC: 182 U/L (ref 55–135)
ALP SERPL-CCNC: 187 U/L (ref 55–135)
ALT SERPL W/O P-5'-P-CCNC: 30 U/L (ref 10–44)
ALT SERPL W/O P-5'-P-CCNC: 30 U/L (ref 10–44)
ANION GAP SERPL CALC-SCNC: 22 MMOL/L (ref 8–16)
ANION GAP SERPL CALC-SCNC: 22 MMOL/L (ref 8–16)
ANION GAP SERPL CALC-SCNC: 23 MMOL/L (ref 8–16)
ASCENDING AORTA: 3.38 CM
AST SERPL-CCNC: 36 U/L (ref 10–40)
AST SERPL-CCNC: 37 U/L (ref 10–40)
AV INDEX (PROSTH): 0.49
AV MEAN GRADIENT: 19.7 MMHG
AV PEAK GRADIENT: 31.4 MMHG
AV VALVE AREA BY VELOCITY RATIO: 1.6 CM²
AV VALVE AREA: 1.5 CM²
AV VELOCITY RATIO: 0.5
BASOPHILS # BLD AUTO: 0.04 K/UL (ref 0–0.2)
BASOPHILS # BLD AUTO: 0.04 K/UL (ref 0–0.2)
BASOPHILS NFR BLD: 0.7 % (ref 0–1.9)
BASOPHILS NFR BLD: 0.8 % (ref 0–1.9)
BILIRUB DIRECT SERPL-MCNC: 0.5 MG/DL (ref 0.1–0.3)
BILIRUB SERPL-MCNC: 0.9 MG/DL (ref 0.1–1)
BILIRUB SERPL-MCNC: 0.9 MG/DL (ref 0.1–1)
BSA FOR ECHO PROCEDURE: 2.19 M2
BUN SERPL-MCNC: 89 MG/DL (ref 6–20)
BUN SERPL-MCNC: 89 MG/DL (ref 6–20)
BUN SERPL-MCNC: 92 MG/DL (ref 6–20)
CALCIUM SERPL-MCNC: 8.7 MG/DL (ref 8.7–10.5)
CALCIUM SERPL-MCNC: 8.7 MG/DL (ref 8.7–10.5)
CALCIUM SERPL-MCNC: 8.8 MG/DL (ref 8.7–10.5)
CHLORIDE SERPL-SCNC: 96 MMOL/L (ref 95–110)
CHLORIDE SERPL-SCNC: 96 MMOL/L (ref 95–110)
CHLORIDE SERPL-SCNC: 97 MMOL/L (ref 95–110)
CO2 SERPL-SCNC: 17 MMOL/L (ref 23–29)
CO2 SERPL-SCNC: 17 MMOL/L (ref 23–29)
CO2 SERPL-SCNC: 18 MMOL/L (ref 23–29)
CREAT SERPL-MCNC: 16.9 MG/DL (ref 0.5–1.4)
CREAT SERPL-MCNC: 17.1 MG/DL (ref 0.5–1.4)
CREAT SERPL-MCNC: 17.4 MG/DL (ref 0.5–1.4)
CV ECHO LV RWT: 0.53 CM
DIFFERENTIAL METHOD BLD: ABNORMAL
DIFFERENTIAL METHOD BLD: ABNORMAL
DOP CALC AO PEAK VEL: 2.8 M/S
DOP CALC AO VTI: 76.8 CM
DOP CALC LVOT AREA: 3.1 CM2
DOP CALC LVOT DIAMETER: 2 CM
DOP CALC LVOT PEAK VEL: 1.4 M/S
DOP CALC LVOT STROKE VOLUME: 117.4 CM3
DOP CALC RVOT PEAK VEL: 0.65 M/S
DOP CALC RVOT VTI: 19.8 CM
DOP CALCLVOT PEAK VEL VTI: 37.4 CM
E WAVE DECELERATION TIME: 376.81 MSEC
E/A RATIO: 1.18
E/E' RATIO: 11.27 M/S
ECHO LV POSTERIOR WALL: 1.4 CM (ref 0.6–1.1)
EJECTION FRACTION: 65 %
EOSINOPHIL # BLD AUTO: 0.4 K/UL (ref 0–0.5)
EOSINOPHIL # BLD AUTO: 0.5 K/UL (ref 0–0.5)
EOSINOPHIL NFR BLD: 8.5 % (ref 0–8)
EOSINOPHIL NFR BLD: 8.7 % (ref 0–8)
ERYTHROCYTE [DISTWIDTH] IN BLOOD BY AUTOMATED COUNT: 17.4 % (ref 11.5–14.5)
ERYTHROCYTE [DISTWIDTH] IN BLOOD BY AUTOMATED COUNT: 17.8 % (ref 11.5–14.5)
EST. GFR  (NO RACE VARIABLE): 3 ML/MIN/1.73 M^2
ETHANOL SERPL-MCNC: <10 MG/DL
FERRITIN SERPL-MCNC: 596 NG/ML (ref 20–300)
FOLATE SERPL-MCNC: 6.4 NG/ML (ref 4–24)
FRACTIONAL SHORTENING: 37.7 % (ref 28–44)
GGT SERPL-CCNC: 156 U/L (ref 8–55)
GLUCOSE SERPL-MCNC: 132 MG/DL (ref 70–110)
GLUCOSE SERPL-MCNC: 84 MG/DL (ref 70–110)
GLUCOSE SERPL-MCNC: 87 MG/DL (ref 70–110)
HAPTOGLOB SERPL-MCNC: 66 MG/DL (ref 30–250)
HAV IGM SERPL QL IA: ABNORMAL
HBV CORE IGM SERPL QL IA: ABNORMAL
HBV SURFACE AG SERPL QL IA: ABNORMAL
HCT VFR BLD AUTO: 27.1 % (ref 40–54)
HCT VFR BLD AUTO: 29.1 % (ref 40–54)
HCV AB SERPL QL IA: REACTIVE
HGB BLD-MCNC: 9.3 G/DL (ref 14–18)
HGB BLD-MCNC: 9.7 G/DL (ref 14–18)
HIV 1+2 AB+HIV1 P24 AG SERPL QL IA: NEGATIVE
IMM GRANULOCYTES # BLD AUTO: 0.02 K/UL (ref 0–0.04)
IMM GRANULOCYTES # BLD AUTO: 0.02 K/UL (ref 0–0.04)
IMM GRANULOCYTES NFR BLD AUTO: 0.3 % (ref 0–0.5)
IMM GRANULOCYTES NFR BLD AUTO: 0.4 % (ref 0–0.5)
INR PPP: 1 (ref 0.8–1.2)
INTERVENTRICULAR SEPTUM: 1.6 CM (ref 0.6–1.1)
IRON SERPL-MCNC: 40 UG/DL (ref 45–160)
IVC DIAMETER: 2.77 CM
IVRT: 94.2 MSEC
LA MAJOR: 7.75 CM
LA MINOR: 7.46 CM
LA WIDTH: 3.8 CM
LACTATE SERPL-SCNC: 0.6 MMOL/L (ref 0.5–2.2)
LDH SERPL L TO P-CCNC: 306 U/L (ref 110–260)
LEFT ATRIUM AREA SYSTOLIC (APICAL 2 CHAMBER): 28.45 CM2
LEFT ATRIUM AREA SYSTOLIC (APICAL 4 CHAMBER): 26.71 CM2
LEFT ATRIUM SIZE: 4.83 CM
LEFT ATRIUM VOLUME INDEX MOD: 40.7 ML/M2
LEFT ATRIUM VOLUME INDEX: 55.9 ML/M2
LEFT ATRIUM VOLUME MOD: 86.19 ML
LEFT ATRIUM VOLUME: 118.6 CM3
LEFT INTERNAL DIMENSION IN SYSTOLE: 3.3 CM (ref 2.1–4)
LEFT VENTRICLE DIASTOLIC VOLUME INDEX: 63.99 ML/M2
LEFT VENTRICLE DIASTOLIC VOLUME: 135.65 ML
LEFT VENTRICLE END SYSTOLIC VOLUME APICAL 2 CHAMBER: 92.74 ML
LEFT VENTRICLE END SYSTOLIC VOLUME APICAL 4 CHAMBER: 80.16 ML
LEFT VENTRICLE MASS INDEX: 166.3 G/M2
LEFT VENTRICLE SYSTOLIC VOLUME INDEX: 21.4 ML/M2
LEFT VENTRICLE SYSTOLIC VOLUME: 45.35 ML
LEFT VENTRICULAR INTERNAL DIMENSION IN DIASTOLE: 5.3 CM (ref 3.5–6)
LEFT VENTRICULAR MASS: 352.5 G
LV LATERAL E/E' RATIO: 11.27 M/S
LV SEPTAL E/E' RATIO: 11.27 M/S
LVED V (TEICH): 135.65 ML
LVES V (TEICH): 45.35 ML
LVOT MG: 4.66 MMHG
LVOT MV: 1.02 CM/S
LYMPHOCYTES # BLD AUTO: 0.5 K/UL (ref 1–4.8)
LYMPHOCYTES # BLD AUTO: 0.6 K/UL (ref 1–4.8)
LYMPHOCYTES NFR BLD: 10.4 % (ref 18–48)
LYMPHOCYTES NFR BLD: 9.9 % (ref 18–48)
MAGNESIUM SERPL-MCNC: 2.4 MG/DL (ref 1.6–2.6)
MCH RBC QN AUTO: 32.2 PG (ref 27–31)
MCH RBC QN AUTO: 32.5 PG (ref 27–31)
MCHC RBC AUTO-ENTMCNC: 33.3 G/DL (ref 32–36)
MCHC RBC AUTO-ENTMCNC: 34.3 G/DL (ref 32–36)
MCV RBC AUTO: 95 FL (ref 82–98)
MCV RBC AUTO: 97 FL (ref 82–98)
MONOCYTES # BLD AUTO: 0.9 K/UL (ref 0.3–1)
MONOCYTES # BLD AUTO: 1.2 K/UL (ref 0.3–1)
MONOCYTES NFR BLD: 17.7 % (ref 4–15)
MONOCYTES NFR BLD: 20.1 % (ref 4–15)
MV PEAK A VEL: 1.05 M/S
MV PEAK E VEL: 1.24 M/S
NEUTROPHILS # BLD AUTO: 3.1 K/UL (ref 1.8–7.7)
NEUTROPHILS # BLD AUTO: 3.5 K/UL (ref 1.8–7.7)
NEUTROPHILS NFR BLD: 59.8 % (ref 38–73)
NEUTROPHILS NFR BLD: 62.7 % (ref 38–73)
NRBC BLD-RTO: 0 /100 WBC
NRBC BLD-RTO: 0 /100 WBC
OHS CV RV/LV RATIO: 1 CM
PHOSPHATE SERPL-MCNC: 9.5 MG/DL (ref 2.7–4.5)
PHOSPHATE SERPL-MCNC: 9.8 MG/DL (ref 2.7–4.5)
PISA MRMAX VEL: 4.82 M/S
PISA TR MAX VEL: 2.75 M/S
PLATELET # BLD AUTO: 102 K/UL (ref 150–450)
PLATELET # BLD AUTO: 104 K/UL (ref 150–450)
PMV BLD AUTO: 10.8 FL (ref 9.2–12.9)
PMV BLD AUTO: 11.9 FL (ref 9.2–12.9)
POCT GLUCOSE: 112 MG/DL (ref 70–110)
POCT GLUCOSE: 122 MG/DL (ref 70–110)
POCT GLUCOSE: 138 MG/DL (ref 70–110)
POCT GLUCOSE: 99 MG/DL (ref 70–110)
POTASSIUM SERPL-SCNC: 5.5 MMOL/L (ref 3.5–5.1)
POTASSIUM SERPL-SCNC: 5.5 MMOL/L (ref 3.5–5.1)
POTASSIUM SERPL-SCNC: 5.6 MMOL/L (ref 3.5–5.1)
POTASSIUM SERPL-SCNC: 5.7 MMOL/L (ref 3.5–5.1)
PROT SERPL-MCNC: 7.4 G/DL (ref 6–8.4)
PROT SERPL-MCNC: 7.5 G/DL (ref 6–8.4)
PROTHROMBIN TIME: 11.8 SEC (ref 9–12.5)
PULM VEIN S/D RATIO: 1.37
PV MEAN GRADIENT: 1 MMHG
PV MV: 0.69 M/S
PV PEAK D VEL: 0.41 M/S
PV PEAK GRADIENT: 3 MMHG
PV PEAK S VEL: 0.56 M/S
PV PEAK VELOCITY: 0.82 M/S
RA MAJOR: 7.28 CM
RA PRESSURE ESTIMATED: 15 MMHG
RA WIDTH: 4.8 CM
RBC # BLD AUTO: 2.86 M/UL (ref 4.6–6.2)
RBC # BLD AUTO: 3.01 M/UL (ref 4.6–6.2)
RIGHT VENTRICLE DIASTOLIC BASEL DIMENSION: 5.3 CM
RIGHT VENTRICLE DIASTOLIC LENGTH: 7.7 CM
RIGHT VENTRICLE DIASTOLIC MID DIMENSION: 3.2 CM
RIGHT VENTRICULAR END-DIASTOLIC DIMENSION: 4.27 CM
RIGHT VENTRICULAR LENGTH IN DIASTOLE (APICAL 4-CHAMBER VIEW): 7.72 CM
RV MID DIAMA: 3.24 CM
RV TB RVSP: 18 MMHG
RV TISSUE DOPPLER FREE WALL SYSTOLIC VELOCITY 1 (APICAL 4 CHAMBER VIEW): 10.63 CM/S
SATURATED IRON: 11 % (ref 20–50)
SODIUM SERPL-SCNC: 135 MMOL/L (ref 136–145)
SODIUM SERPL-SCNC: 136 MMOL/L (ref 136–145)
SODIUM SERPL-SCNC: 137 MMOL/L (ref 136–145)
STJ: 2.64 CM
T4 FREE SERPL-MCNC: 0.88 NG/DL (ref 0.71–1.51)
TDI LATERAL: 0.11 M/S
TDI SEPTAL: 0.11 M/S
TDI: 0.11 M/S
TOTAL IRON BINDING CAPACITY: 373 UG/DL (ref 250–450)
TR MAX PG: 30 MMHG
TR MEAN GRADIENT: 19 MMHG
TRANSFERRIN SERPL-MCNC: 252 MG/DL (ref 200–375)
TRICUSPID ANNULAR PLANE SYSTOLIC EXCURSION: 1.33 CM
TROPONIN I SERPL DL<=0.01 NG/ML-MCNC: 0.1 NG/ML (ref 0–0.03)
TROPONIN I SERPL DL<=0.01 NG/ML-MCNC: 0.1 NG/ML (ref 0–0.03)
TSH SERPL DL<=0.005 MIU/L-ACNC: 4.53 UIU/ML (ref 0.4–4)
TV REST PULMONARY ARTERY PRESSURE: 45 MMHG
VIT B12 SERPL-MCNC: 823 PG/ML (ref 210–950)
WBC # BLD AUTO: 4.96 K/UL (ref 3.9–12.7)
WBC # BLD AUTO: 5.87 K/UL (ref 3.9–12.7)
Z-SCORE OF LEFT VENTRICULAR DIMENSION IN END DIASTOLE: -2.34
Z-SCORE OF LEFT VENTRICULAR DIMENSION IN END SYSTOLE: -1.69

## 2024-10-13 PROCEDURE — 99900035 HC TECH TIME PER 15 MIN (STAT)

## 2024-10-13 PROCEDURE — 99223 1ST HOSP IP/OBS HIGH 75: CPT | Mod: 25,,, | Performed by: STUDENT IN AN ORGANIZED HEALTH CARE EDUCATION/TRAINING PROGRAM

## 2024-10-13 PROCEDURE — 99233 SBSQ HOSP IP/OBS HIGH 50: CPT | Mod: ,,, | Performed by: INTERNAL MEDICINE

## 2024-10-13 PROCEDURE — 84484 ASSAY OF TROPONIN QUANT: CPT | Performed by: INTERNAL MEDICINE

## 2024-10-13 PROCEDURE — 84100 ASSAY OF PHOSPHORUS: CPT | Performed by: STUDENT IN AN ORGANIZED HEALTH CARE EDUCATION/TRAINING PROGRAM

## 2024-10-13 PROCEDURE — 85025 COMPLETE CBC W/AUTO DIFF WBC: CPT | Mod: 91 | Performed by: STUDENT IN AN ORGANIZED HEALTH CARE EDUCATION/TRAINING PROGRAM

## 2024-10-13 PROCEDURE — 84075 ASSAY ALKALINE PHOSPHATASE: CPT | Performed by: INTERNAL MEDICINE

## 2024-10-13 PROCEDURE — 25000242 PHARM REV CODE 250 ALT 637 W/ HCPCS: Performed by: INTERNAL MEDICINE

## 2024-10-13 PROCEDURE — 80053 COMPREHEN METABOLIC PANEL: CPT | Performed by: STUDENT IN AN ORGANIZED HEALTH CARE EDUCATION/TRAINING PROGRAM

## 2024-10-13 PROCEDURE — 25000003 PHARM REV CODE 250: Performed by: INTERNAL MEDICINE

## 2024-10-13 PROCEDURE — 63600175 PHARM REV CODE 636 W HCPCS: Mod: JZ,JG | Performed by: STUDENT IN AN ORGANIZED HEALTH CARE EDUCATION/TRAINING PROGRAM

## 2024-10-13 PROCEDURE — 94761 N-INVAS EAR/PLS OXIMETRY MLT: CPT

## 2024-10-13 PROCEDURE — 83735 ASSAY OF MAGNESIUM: CPT | Performed by: STUDENT IN AN ORGANIZED HEALTH CARE EDUCATION/TRAINING PROGRAM

## 2024-10-13 PROCEDURE — 94640 AIRWAY INHALATION TREATMENT: CPT

## 2024-10-13 PROCEDURE — 27000221 HC OXYGEN, UP TO 24 HOURS

## 2024-10-13 PROCEDURE — 21400001 HC TELEMETRY ROOM

## 2024-10-13 PROCEDURE — 36415 COLL VENOUS BLD VENIPUNCTURE: CPT | Performed by: INTERNAL MEDICINE

## 2024-10-13 PROCEDURE — 80048 BASIC METABOLIC PNL TOTAL CA: CPT | Mod: XB | Performed by: STUDENT IN AN ORGANIZED HEALTH CARE EDUCATION/TRAINING PROGRAM

## 2024-10-13 PROCEDURE — 85025 COMPLETE CBC W/AUTO DIFF WBC: CPT | Performed by: INTERNAL MEDICINE

## 2024-10-13 PROCEDURE — 36415 COLL VENOUS BLD VENIPUNCTURE: CPT | Performed by: STUDENT IN AN ORGANIZED HEALTH CARE EDUCATION/TRAINING PROGRAM

## 2024-10-13 PROCEDURE — 80069 RENAL FUNCTION PANEL: CPT | Performed by: INTERNAL MEDICINE

## 2024-10-13 PROCEDURE — 84450 TRANSFERASE (AST) (SGOT): CPT | Performed by: INTERNAL MEDICINE

## 2024-10-13 PROCEDURE — 25000003 PHARM REV CODE 250: Performed by: STUDENT IN AN ORGANIZED HEALTH CARE EDUCATION/TRAINING PROGRAM

## 2024-10-13 PROCEDURE — 63600175 PHARM REV CODE 636 W HCPCS: Performed by: INTERNAL MEDICINE

## 2024-10-13 RX ORDER — MUPIROCIN 20 MG/G
OINTMENT TOPICAL 2 TIMES DAILY
Status: DISCONTINUED | OUTPATIENT
Start: 2024-10-13 | End: 2024-10-15 | Stop reason: HOSPADM

## 2024-10-13 RX ORDER — SODIUM CHLORIDE 9 MG/ML
INJECTION, SOLUTION INTRAVENOUS ONCE
Status: DISCONTINUED | OUTPATIENT
Start: 2024-10-13 | End: 2024-10-13

## 2024-10-13 RX ORDER — MUPIROCIN 20 MG/G
OINTMENT TOPICAL 2 TIMES DAILY
Status: DISCONTINUED | OUTPATIENT
Start: 2024-10-13 | End: 2024-10-13

## 2024-10-13 RX ORDER — MUPIROCIN 20 MG/G
OINTMENT TOPICAL DAILY
Status: DISCONTINUED | OUTPATIENT
Start: 2024-10-13 | End: 2024-10-13

## 2024-10-13 RX ORDER — SODIUM CHLORIDE 9 MG/ML
INJECTION, SOLUTION INTRAVENOUS
Status: DISCONTINUED | OUTPATIENT
Start: 2024-10-13 | End: 2024-10-13

## 2024-10-13 RX ORDER — LANOLIN ALCOHOL/MO/W.PET/CERES
1 CREAM (GRAM) TOPICAL DAILY
Status: DISCONTINUED | OUTPATIENT
Start: 2024-10-14 | End: 2024-10-15 | Stop reason: HOSPADM

## 2024-10-13 RX ADMIN — MUPIROCIN: 20 OINTMENT TOPICAL at 03:10

## 2024-10-13 RX ADMIN — ASPIRIN 81 MG: 81 TABLET, COATED ORAL at 09:10

## 2024-10-13 RX ADMIN — SUCROFERRIC OXYHYDROXIDE 1000 MG: 500 TABLET, CHEWABLE ORAL at 12:10

## 2024-10-13 RX ADMIN — SUCROFERRIC OXYHYDROXIDE 1000 MG: 500 TABLET, CHEWABLE ORAL at 09:10

## 2024-10-13 RX ADMIN — SODIUM ZIRCONIUM CYCLOSILICATE 10 G: 5 POWDER, FOR SUSPENSION ORAL at 03:10

## 2024-10-13 RX ADMIN — ONDANSETRON 4 MG: 2 INJECTION INTRAMUSCULAR; INTRAVENOUS at 03:10

## 2024-10-13 RX ADMIN — IPRATROPIUM BROMIDE AND ALBUTEROL SULFATE 3 ML: 2.5; .5 SOLUTION RESPIRATORY (INHALATION) at 11:10

## 2024-10-13 RX ADMIN — SODIUM ZIRCONIUM CYCLOSILICATE 10 G: 5 POWDER, FOR SUSPENSION ORAL at 08:10

## 2024-10-13 RX ADMIN — HYDRALAZINE HYDROCHLORIDE 100 MG: 50 TABLET ORAL at 08:10

## 2024-10-13 RX ADMIN — HUMAN INSULIN 10 UNITS: 100 INJECTION, SOLUTION SUBCUTANEOUS at 03:10

## 2024-10-13 RX ADMIN — HYDRALAZINE HYDROCHLORIDE 10 MG: 20 INJECTION, SOLUTION INTRAMUSCULAR; INTRAVENOUS at 11:10

## 2024-10-13 RX ADMIN — TAMSULOSIN HYDROCHLORIDE 0.4 MG: 0.4 CAPSULE ORAL at 09:10

## 2024-10-13 RX ADMIN — HYDRALAZINE HYDROCHLORIDE 100 MG: 50 TABLET ORAL at 09:10

## 2024-10-13 RX ADMIN — MUPIROCIN: 20 OINTMENT TOPICAL at 09:10

## 2024-10-13 RX ADMIN — CARVEDILOL 12.5 MG: 12.5 TABLET, FILM COATED ORAL at 08:10

## 2024-10-13 RX ADMIN — HEPARIN SODIUM 5000 UNITS: 5000 INJECTION INTRAVENOUS; SUBCUTANEOUS at 09:10

## 2024-10-13 RX ADMIN — HYDROXYZINE HYDROCHLORIDE 25 MG: 25 TABLET ORAL at 11:10

## 2024-10-13 RX ADMIN — CARVEDILOL 12.5 MG: 12.5 TABLET, FILM COATED ORAL at 09:10

## 2024-10-13 RX ADMIN — DEXTROSE MONOHYDRATE 500 ML: 100 INJECTION, SOLUTION INTRAVENOUS at 03:10

## 2024-10-13 RX ADMIN — SODIUM ZIRCONIUM CYCLOSILICATE 10 G: 5 POWDER, FOR SUSPENSION ORAL at 09:10

## 2024-10-13 RX ADMIN — HYDRALAZINE HYDROCHLORIDE 100 MG: 50 TABLET ORAL at 03:10

## 2024-10-13 RX ADMIN — HEPARIN SODIUM 5000 UNITS: 5000 INJECTION INTRAVENOUS; SUBCUTANEOUS at 05:10

## 2024-10-13 RX ADMIN — AMLODIPINE BESYLATE 10 MG: 10 TABLET ORAL at 09:10

## 2024-10-13 RX ADMIN — HEPARIN SODIUM 5000 UNITS: 5000 INJECTION INTRAVENOUS; SUBCUTANEOUS at 03:10

## 2024-10-13 RX ADMIN — CALCIUM GLUCONATE 1 G: 98 INJECTION, SOLUTION INTRAVENOUS at 03:10

## 2024-10-13 NOTE — SUBJECTIVE & OBJECTIVE
Interval History: No acute events overnight, afebrile, hemodynamically stable.  Reports shortness of breath, lower extremity edema.   Nephrology consulted and following for inpatient dialysis concerns.  Discussed with Nephrology, plans for dialysis tomorrow morning.    Objective:     Vital Signs (Most Recent):  Temp: 97.6 °F (36.4 °C) (10/13/24 0841)  Pulse: (!) 58 (10/13/24 1111)  Resp: 16 (10/13/24 0841)  BP: (!) 151/86 (10/13/24 0841)  SpO2: 95 % (10/13/24 1111) Vital Signs (24h Range):  Temp:  [97.3 °F (36.3 °C)-97.6 °F (36.4 °C)] 97.6 °F (36.4 °C)  Pulse:  [58-67] 58  Resp:  [16-24] 16  SpO2:  [93 %-99 %] 95 %  BP: (146-197)/() 151/86     Weight: 101.3 kg (223 lb 5.2 oz)  Body mass index is 34.98 kg/m².  No intake or output data in the 24 hours ending 10/13/24 1722      Physical Exam  Vitals reviewed.   Constitutional:       General: He is not in acute distress.     Appearance: He is obese. He is ill-appearing. He is not diaphoretic.      Comments: Intermittently somnolent, but awakens and responds appropriately   HENT:      Mouth/Throat:      Mouth: Mucous membranes are moist.   Cardiovascular:      Rate and Rhythm: Normal rate and regular rhythm.   Pulmonary:      Effort: Respiratory distress (mild tachypnea) present.      Breath sounds: No wheezing.   Abdominal:      General: Bowel sounds are normal. There is distension.      Palpations: Abdomen is soft.      Tenderness: There is no abdominal tenderness. There is no guarding or rebound.   Musculoskeletal:         General: Swelling present.      Right lower leg: Edema present.      Left lower leg: Edema present.      Comments: Facial rash with pruritus and minimal bleeding related to patient scratching   Skin:     General: Skin is warm and dry.   Neurological:      Mental Status: He is alert and oriented to person, place, and time.   Psychiatric:         Mood and Affect: Mood normal.         Behavior: Behavior normal.      Comments: Drowsy but arousable              Significant Labs: All pertinent labs within the past 24 hours have been reviewed.  LABS:  Recent Labs   Lab 10/12/24  2027 10/13/24  0256 10/13/24  0935    136 137   K 5.4* 5.6* 5.7*   CL 96 96 97   CO2 19* 18* 17*   BUN 84* 89* 92*   CREATININE 16.8* 16.9* 17.1*   GLU 89 87 84   ANIONGAP 22* 22* 23*     Recent Labs   Lab 10/12/24  2027 10/13/24  0256 10/13/24  0935   MG 2.4  --  2.4   PHOS 9.1* 9.5* 9.8*     Recent Labs   Lab 10/12/24  2027 10/13/24  0256 10/13/24  0935   AST 39 36 37   ALT 34 30 30   ALKPHOS 203* 187* 182*   BILITOT 0.9 0.9 0.9   BILIDIR  --  0.5*  --    ALBUMIN 3.6 3.5  3.5 3.5     POCT Glucose:   Recent Labs   Lab 10/13/24  1502   POCTGLUCOSE 112*    Recent Labs   Lab 10/12/24  2027 10/13/24  0256 10/13/24  0935   WBC 6.33 5.87 4.96   HGB 9.4* 9.3* 9.7*   HCT 27.5* 27.1* 29.1*   * 102* 104*   GRAN 62.0  3.9 59.8  3.5 62.7  3.1        Micro  Blood Cultures  Lab Results   Component Value Date    LABBLOO No growth after 5 days. 02/01/2024    LABBLOO No growth after 5 days. 02/01/2024       Significant Imaging: I have reviewed all pertinent imaging results/findings within the past 24 hours.  US Abdomen Limited   Final Result      1. The liver has a nodular surface and is heterogeneous in appearance.  This is characteristic of hepatic cirrhosis.   2. There is a small amount of ascites.   3. Cholelithiasis.  The common bile duct is dilated. It has a diameter of 7 mm.         Electronically signed by: Jose Wilkerson MD   Date:    10/13/2024   Time:    08:16      X-Ray Chest AP Portable   Final Result      No acute abnormality.         Electronically signed by: Braulio Willams   Date:    10/12/2024   Time:    20:46          Inpatient Medications:    Scheduled Meds:   amLODIPine  10 mg Oral Daily    aspirin  81 mg Oral Daily    carvediloL  12.5 mg Oral BID    heparin (porcine)  5,000 Units Subcutaneous Q8H    hydrALAZINE  100 mg Oral TID    mupirocin   Topical (Top) BID     sodium zirconium cyclosilicate  10 g Oral TID    sucroferric oxyhydroxide  2 tablet Oral TID WM    tamsulosin  0.4 mg Oral Daily     PRN Meds:  Current Facility-Administered Medications:     acetaminophen, 650 mg, Oral, Q6H PRN    albuterol-ipratropium, 3 mL, Nebulization, Q6H PRN    [COMPLETED] calcium gluconate IVPB, 1 g, Intravenous, Once **AND** calcium gluconate IVPB, 1 g, Intravenous, Q10 Min PRN    dextrose 10%, 12.5 g, Intravenous, PRN    dextrose 10%, 25 g, Intravenous, PRN    [COMPLETED] dextrose 10%, 50 g, Intravenous, Once **AND** dextrose 10%, 25 g, Intravenous, PRN **AND** [COMPLETED] insulin regular, 10 Units, Intravenous, Once    glucagon (human recombinant), 1 mg, Intramuscular, PRN    glucose, 16 g, Oral, PRN    glucose, 24 g, Oral, PRN    hydrALAZINE, 10 mg, Intravenous, Q6H PRN    hydrOXYzine HCL, 25 mg, Oral, TID PRN    naloxone, 0.02 mg, Intravenous, PRN    ondansetron, 4 mg, Intravenous, Q6H PRN    sodium chloride 0.9%, 10 mL, Intravenous, Q12H PRN

## 2024-10-13 NOTE — ASSESSMENT & PLAN NOTE
-patient does have abdominal distention most likely related to gross anasarca, no abdominal tenderness to palpation  -Abdominal U/S 10/13 noted:   1. The liver has a nodular surface and is heterogeneous in appearance.  This is characteristic of hepatic cirrhosis.   2. There is a small amount of ascites.   3. Cholelithiasis.  The common bile duct is dilated. It has a diameter of 7 mm.     -alk phos 203 with remaining LFTs unremarkable  -Trend on CMP

## 2024-10-13 NOTE — SUBJECTIVE & OBJECTIVE
Past Medical History:   Diagnosis Date    Alcohol abuse     Anxiety     BPH (benign prostatic hyperplasia)     Cocaine abuse     Drug abuse     ESRD (end stage renal disease) on dialysis     GERD (gastroesophageal reflux disease)     Hypertension 2/2/2024    Obesity (BMI 30-39.9)     Renal disorder     Tobacco use        Past Surgical History:   Procedure Laterality Date    HERNIA REPAIR         Review of patient's allergies indicates:  No Known Allergies    No current facility-administered medications on file prior to encounter.     Current Outpatient Medications on File Prior to Encounter   Medication Sig    amLODIPine (NORVASC) 10 MG tablet Take 1 tablet by mouth once daily.    carvediloL (COREG) 12.5 MG tablet Take 1 tablet by mouth 2 (two) times daily.    hydrALAZINE (APRESOLINE) 100 MG tablet Take 100 mg by mouth 3 (three) times daily.    tamsulosin (FLOMAX) 0.4 mg Cap Take 0.4 mg by mouth once daily.    aspirin (ECOTRIN) 81 MG EC tablet Take 1 tablet (81 mg total) by mouth once daily. for 14 days    HYDROcodone-acetaminophen (NORCO) 5-325 mg per tablet Take 1 tablet by mouth every 8 (eight) hours as needed for Pain.    VELPHORO 500 mg Chew Take 2 tablets by mouth 3 (three) times daily with meals.     Family History    None       Tobacco Use    Smoking status: Former    Smokeless tobacco: Never   Substance and Sexual Activity    Alcohol use: Yes     Comment: occasionally    Drug use: Yes     Types: Cocaine    Sexual activity: Not Currently     Review of Systems   Constitutional:  Negative for chills and fever.   Respiratory:  Positive for shortness of breath. Negative for cough.    Cardiovascular:  Positive for leg swelling (gross anasarca). Negative for chest pain.   Gastrointestinal:  Positive for abdominal distention. Negative for abdominal pain, constipation, diarrhea, nausea and vomiting.   Skin:  Positive for rash.   All other systems reviewed and are negative.    Objective:     Vital Signs (Most  Recent):  Temp: 97.3 °F (36.3 °C) (10/12/24 1913)  Pulse: 66 (10/12/24 2301)  Resp: (!) 21 (10/12/24 2105)  BP: (!) 171/96 (10/12/24 2301)  SpO2: (!) 94 % (10/12/24 2301) Vital Signs (24h Range):  Temp:  [97.3 °F (36.3 °C)] 97.3 °F (36.3 °C)  Pulse:  [65-67] 66  Resp:  [21-24] 21  SpO2:  [94 %-96 %] 94 %  BP: (171-197)/() 171/96     Weight: 99.8 kg (220 lb)  Body mass index is 34.46 kg/m².     Physical Exam  Vitals reviewed.   Constitutional:       Appearance: He is obese. He is ill-appearing.   HENT:      Nose: Nose normal.   Eyes:      Conjunctiva/sclera: Conjunctivae normal.   Cardiovascular:      Rate and Rhythm: Normal rate.   Pulmonary:      Effort: Respiratory distress (mild tachypnea) present.      Breath sounds: No wheezing.   Abdominal:      General: There is distension.      Tenderness: There is no abdominal tenderness.   Musculoskeletal:         General: Swelling present.      Right lower leg: Edema present.      Left lower leg: Edema present.      Comments: Facial rash with pruritus and minimal bleeding related to patient scratching   Skin:     General: Skin is warm and dry.   Neurological:      Mental Status: He is alert and oriented to person, place, and time.   Psychiatric:         Mood and Affect: Mood normal.         Behavior: Behavior normal.      Comments: Drowsy but arousable                Significant Labs: All pertinent labs within the past 24 hours have been reviewed.  Recent Lab Results         10/12/24  2027        Albumin 3.6              ALT 34       Anion Gap 22       AST 39       Baso # 0.03       Basophil % 0.5       BILIRUBIN TOTAL 0.9  Comment: For infants and newborns, interpretation of results should be based  on gestational age, weight and in agreement with clinical  observations.    Premature Infant recommended reference ranges:  Up to 24 hours.............<8.0 mg/dL  Up to 48 hours............<12.0 mg/dL  3-5 days..................<15.0 mg/dL  6-29  days.................<15.0 mg/dL         BNP 2,973  Comment: Values of less than 100 pg/ml are consistent with non-CHF populations.       BUN 84       Calcium 8.8       Chloride 96       CO2 19       Creatinine 16.8       Differential Method Automated       eGFR 3       Eos # 0.5       Eos % 7.3       Glucose 89       Gran # (ANC) 3.9       Gran % 62.0       Hematocrit 27.5       Hemoglobin 9.4       Immature Grans (Abs) 0.03  Comment: Mild elevation in immature granulocytes is non specific and   can be seen in a variety of conditions including stress response,   acute inflammation, trauma and pregnancy. Correlation with other   laboratory and clinical findings is essential.         Immature Granulocytes 0.5       Lymph # 0.7       Lymph % 10.6       Magnesium  2.4       MCH 31.9       MCHC 34.2       MCV 93       Mono # 1.2       Mono % 19.1       MPV 11.2       nRBC 0       Phosphorus Level 9.1  Comment: phosphorus critical result(s) called and verbal readback obtained   from kaylyn brennan rn by KAT5 10/12/2024 21:25         Platelet Count 101       Potassium 5.4       PROTEIN TOTAL 7.5       RBC 2.95       RDW 17.4       Sodium 137       Troponin I 0.095  Comment: The reference interval for Troponin I represents the 99th percentile   cutoff   for our facility and is consistent with 3rd generation assay   performance.         WBC 6.33               Significant Imaging: I have reviewed all pertinent imaging results/findings within the past 24 hours.  X-Ray Chest AP Portable   Final Result      No acute abnormality.         Electronically signed by: Braulio Willams   Date:    10/12/2024   Time:    20:46

## 2024-10-13 NOTE — ASSESSMENT & PLAN NOTE
Elevated troponin with abnormal EKG  -patient denies chest pain does have shortness of breath.  Dyspnea most likely related to volume overload with end-stage renal disease and noncompliance with dialysis  -troponin 0.095, BNP 2973  -chest x-ray negative  -EKG with sinus rhythm with first-degree AV block and questionable inferior/anterior infarct that is age undetermined  -continue aspirin and Coreg  -check serial cardiac enzymes  -check echocardiogram  -telemetry monitoring  -cardiology consult for a.m.

## 2024-10-13 NOTE — ASSESSMENT & PLAN NOTE
-likely uremic pruritus and/or calcinosis cutis in the setting of end-stage renal disease  -calciphylaxis unlikely given appearance of facial rash  -wound care consult given open lesions  -p.r.n. Atarax

## 2024-10-13 NOTE — ASSESSMENT & PLAN NOTE
Volume overload with gross anasarca related to noncompliance with dialysis  -chest x-ray negative  -BNP 2973  -previous echocardiogram 07/11/2023 with EF greater than 65%  -Results for orders placed during the hospital encounter of 10/12/24  Echo  Interpretation Summary    Left Ventricle: The left ventricle is normal in size. Normal wall thickness. There is concentric hypertrophy. Normal wall motion. There is normal systolic function with a visually estimated ejection fraction of 60 - 65%. Ejection fraction is approximately 65%. There is normal diastolic function.    Right Ventricle: Mild right ventricular enlargement. Systolic function is normal.    Left Atrium: Left atrium is severely dilated.    Right Atrium: Right atrium is moderately dilated.    Aortic Valve: The aortic valve is a trileaflet valve. Mildly calcified cusps. Mildly restricted motion. There is mild stenosis. Aortic valve area by VTI is 1.5 cm². Aortic valve peak velocity is 2.8 m/s. Mean gradient is 19.7 mmHg. The dimensionless index is 0.49.    Mitral Valve: There is mild regurgitation.    Tricuspid Valve: There is moderate to severe regurgitation.    Pulmonary Artery: The estimated pulmonary artery systolic pressure is 45 mmHg.    IVC/SVC: Elevated venous pressure at 15 mmHg.    Pericardium: Ascites present.      PLAN:  -nephrology has been consulted for dialysis

## 2024-10-13 NOTE — HPI
59-year-old man with history of end-stage renal disease on hemodialysis TTS, Goodpasture syndrome, hypertension, BPH, gastroesophageal reflux disease, obesity, right upper extremity cellulitis, cocaine abuse, substance abuse, gastroesophageal reflux disease, and obesity who presents to the emergency department with complaint of shortness of breath and swelling everywhere over the last several days.  Symptoms are constant, moderate-to-severe in nature, and with no aggravating or alleviating factor identified.  Patient has missed dialysis over the last week as he has recently moved and has been having transportation issues.  Patient does admit that he is noncompliant with his home medications to include antihypertensives.  Patient denies any associated chest pain, cough, nausea, vomiting, fevers, chills, diarrhea, constipation.  Patient reports he does still produce a very small amount of urine on occasion.  Abdomen is significantly distended but without abdominal pain.  Patient also has a facial rash that is pruritic; he has been scratching it and has some/minimal active bleeding appreciated.  Abnormal labs include hemoglobin 9.4, hematocrit 27.5, platelets 101, potassium 5.4, CO2 19, BUN 84, creatinine 16.8, phosphorus 9.1, alk phos 203, BNP 2973, and troponin 0.095.  ER physician did reach out to Nephrology who does not feel that patient requires emergent hemodialysis.    Last hospital admit 2/1-02/02/2024 for right upper extremity cellulitis requiring IV vancomycin and discharged home with oral doxycycline.

## 2024-10-13 NOTE — ASSESSMENT & PLAN NOTE
Patients blood pressure range in the last 24 hours was: BP  Min: 171/96  Max: 197/95.The patient's inpatient anti-hypertensive regimen is listed below:  Current Antihypertensives  amLODIPine tablet 10 mg, Daily, Oral  carvediloL tablet 12.5 mg, 2 times daily, Oral  furosemide injection 80 mg, Once, Intravenous  hydrALAZINE injection 10 mg, Every 6 hours PRN, Intravenous  hydrALAZINE tablet 100 mg, 3 times daily, Oral    Plan  - BP is uncontrolled, will adjust as follows:  Continue home medications as patient reports he has been noncompliant with these, 1st dose of each given while in the emergency department  - add p.r.n. IV hydralazine with parameters

## 2024-10-13 NOTE — HPI
David Guzman is a  59 year old male with a past medical history of end-stage renal disease on hemodialysis TTS, Goodpasture syndrome, hypertension, BPH, gastroesophageal reflux disease, obesity, right upper extremity cellulitis, cocaine abuse, substance abuse, gastroesophageal reflux disease, and obesity. He presents with notable abdominal swelling and SOB. Patient reports that he missed dialysis twice due to personal issues involving relocation and inability to get to appointments. Patient reports SOB and tightness in his stomach, but denies any issues laying flat or waking up SOB. He also states that he has not been urinating as much. He denies history of any cardiac issues. Patient has been receiving dialysis for over a year. Dialysis planned for today due to notable swelling and SOB. Potassium elevated at 5.7. Bicarb low at 17. Creatinine up at 17.1. Troponin trended up from 0.098 to 0.104. BNP elevated at 2,973.      EKG:Sinus rhythm with 1st degree A-V block, can not rule out inferior and anterior infarct  Echo EF 65% moderate-to-severe TR, PASP 60 mmHg  Cardiology consulted for elevated troponin.

## 2024-10-13 NOTE — ED PROVIDER NOTES
"SCRIBE #1 NOTE: I, Vivian Siomara, am scribing for, and in the presence of, Roxanna Vázquez DO. I have scribed the entire note.       History     Chief Complaint   Patient presents with    Leg Swelling     Pt moved and has not been to dialysis since "week before last" c/o SOB, and bilateral leg swelling with weeping scabs on face. States "phosphorous is too high so I'm leaking"      Review of patient's allergies indicates:  No Known Allergies      History of Present Illness     HPI    10/12/2024, 8:25 PM  History obtained from the patient      History of Present Illness: David Guzman is a 59 y.o. male patient with a PMHx of cocaine abuse, drug abuse, alcohol abuse, tobacco use,BPH, HTN, and renal disorder who presents to the Emergency Department for evaluation of BLE edema which onset a couple days ago. Pt reports missing dialysis twice. Pt is scheduled for dialysis every Tuesday, Thursday, and Saturday. Pt states his last dialysis was last week, on Saturday. Pt's reasoning for missing dialysis is transportation complications. Pt reports his phosphorous is high per dialysis doctor. Symptoms are constant and moderate in severity. No mitigating or exacerbating factors reported. Associated sxs include SOB, diarrhea, and nausea. Patient denies any chest pain, vomiting, and all other sxs at this time. No prior Tx provided.  Pt is not diabetic. Pt does not produce urine. No further complaints or concerns at this time.       Arrival mode: Personal vehicle    PCP: Martha Chung MD        Past Medical History:  Past Medical History:   Diagnosis Date    Alcohol abuse     Anxiety     BPH (benign prostatic hyperplasia)     Cocaine abuse     Drug abuse     ESRD (end stage renal disease) on dialysis     GERD (gastroesophageal reflux disease)     Hypertension 2/2/2024    Obesity (BMI 30-39.9)     Renal disorder     Tobacco use        Past Surgical History:  Past Surgical History:   Procedure Laterality Date    " HERNIA REPAIR           Family History:  Family History   Problem Relation Name Age of Onset    COPD Neg Hx         Social History:  Social History     Tobacco Use    Smoking status: Former    Smokeless tobacco: Never   Substance and Sexual Activity    Alcohol use: Yes     Comment: occasionally    Drug use: Yes     Types: Cocaine    Sexual activity: Not Currently        Review of Systems     Review of Systems   Respiratory:  Positive for shortness of breath.    Cardiovascular:  Negative for chest pain.   Gastrointestinal:  Positive for diarrhea and nausea. Negative for vomiting.   Genitourinary:         (+) oliguria   Musculoskeletal:         (+) Edema (BLE)   Skin:  Positive for rash and wound.   All other systems reviewed and are negative.     Physical Exam     Initial Vitals [10/12/24 1913]   BP Pulse Resp Temp SpO2   (!) 181/98 67 (!) 24 97.3 °F (36.3 °C) 96 %      MAP       --          Physical Exam  Nursing Notes and Vital Signs Reviewed.  Constitutional: Patient is in no acute distress. Well-developed and well-nourished.  Head: Atraumatic. Normocephalic.  Eyes: PERRL. EOM intact. Conjunctivae are not pale. No scleral icterus.  ENT: Mucous membranes are moist. Oropharynx is clear and symmetric.    Neck: Supple. Full ROM. No lymphadenopathy. JVD  Cardiovascular: Regular rate. Regular rhythm.  Systolic murmur, no rubs, or gallops. Distal pulses are 2+ and symmetric.   Pulmonary/Chest: No respiratory distress. Tachypneic. Clear to auscultation bilaterally.   Abdominal: Soft and non-distended.  There is no tenderness.  No rebound, guarding, or rigidity. Good bowel sounds.  Genitourinary: No CVA tenderness  Musculoskeletal: Moves all extremities. No obvious deformities. No calf tenderness. 2+ pitting edema BLE.  Skin: Warm and dry. Facial sores.  Neurological:  Alert, awake, and appropriate.  Normal speech.  No acute focal neurological deficits are appreciated.  Psychiatric: Normal affect. Good eye contact.  "Appropriate in content.     ED Course   Critical Care    Date/Time: 10/12/2024 9:35 PM    Performed by: Roxanna Vázquez DO  Authorized by: Roxanna Vázquez,   Direct patient critical care time: 25 minutes  Additional history critical care time: 20 minutes  Ordering / reviewing critical care time: 20 minutes  Documentation critical care time: 5 minutes  Consulting other physicians critical care time: 5 minutes  Total critical care time (exclusive of procedural time) : 75 minutes  Critical care time was exclusive of separately billable procedures and treating other patients and teaching time.  Critical care was necessary to treat or prevent imminent or life-threatening deterioration of the following conditions: renal failure.  Critical care was time spent personally by me on the following activities: development of treatment plan with patient or surrogate, discussions with consultants, evaluation of patient's response to treatment, examination of patient, obtaining history from patient or surrogate, ordering and performing treatments and interventions, ordering and review of laboratory studies, ordering and review of radiographic studies, pulse oximetry and re-evaluation of patient's condition.        ED Vital Signs:  Vitals:    10/12/24 1913 10/12/24 1948 10/12/24 1953 10/12/24 2101   BP: (!) 181/98  (!) 179/99 (!) 197/95   Pulse: 67  65    Resp: (!) 24      Temp: 97.3 °F (36.3 °C)      TempSrc: Oral      SpO2: 96%  95%    Weight:  99.8 kg (220 lb)     Height: 5' 7" (1.702 m)       10/12/24 2105 10/12/24 2142   BP:  (!) 187/101   Pulse: 66    Resp: (!) 21    Temp:     TempSrc:     SpO2: 96%    Weight:     Height:         Abnormal Lab Results:  Labs Reviewed   CBC W/ AUTO DIFFERENTIAL - Abnormal       Result Value    WBC 6.33      RBC 2.95 (*)     Hemoglobin 9.4 (*)     Hematocrit 27.5 (*)     MCV 93      MCH 31.9 (*)     MCHC 34.2      RDW 17.4 (*)     Platelets 101 (*)     MPV 11.2      Immature Granulocytes 0.5 "      Gran # (ANC) 3.9      Immature Grans (Abs) 0.03      Lymph # 0.7 (*)     Mono # 1.2 (*)     Eos # 0.5      Baso # 0.03      nRBC 0      Gran % 62.0      Lymph % 10.6 (*)     Mono % 19.1 (*)     Eosinophil % 7.3      Basophil % 0.5      Differential Method Automated     COMPREHENSIVE METABOLIC PANEL - Abnormal    Sodium 137      Potassium 5.4 (*)     Chloride 96      CO2 19 (*)     Glucose 89      BUN 84 (*)     Creatinine 16.8 (*)     Calcium 8.8      Total Protein 7.5      Albumin 3.6      Total Bilirubin 0.9      Alkaline Phosphatase 203 (*)     AST 39      ALT 34      eGFR 3 (*)     Anion Gap 22 (*)    TROPONIN I - Abnormal    Troponin I 0.095 (*)    B-TYPE NATRIURETIC PEPTIDE - Abnormal    BNP 2,973 (*)    PHOSPHORUS - Abnormal    Phosphorus 9.1 (*)     Narrative:     phosphorus critical result(s) called and verbal readback obtained   from kaylyn brennan rn by BRI 10/12/2024 21:25   MAGNESIUM    Magnesium 2.4          All Lab Results:  Results for orders placed or performed during the hospital encounter of 10/12/24   CBC auto differential    Collection Time: 10/12/24  8:27 PM   Result Value Ref Range    WBC 6.33 3.90 - 12.70 K/uL    RBC 2.95 (L) 4.60 - 6.20 M/uL    Hemoglobin 9.4 (L) 14.0 - 18.0 g/dL    Hematocrit 27.5 (L) 40.0 - 54.0 %    MCV 93 82 - 98 fL    MCH 31.9 (H) 27.0 - 31.0 pg    MCHC 34.2 32.0 - 36.0 g/dL    RDW 17.4 (H) 11.5 - 14.5 %    Platelets 101 (L) 150 - 450 K/uL    MPV 11.2 9.2 - 12.9 fL    Immature Granulocytes 0.5 0.0 - 0.5 %    Gran # (ANC) 3.9 1.8 - 7.7 K/uL    Immature Grans (Abs) 0.03 0.00 - 0.04 K/uL    Lymph # 0.7 (L) 1.0 - 4.8 K/uL    Mono # 1.2 (H) 0.3 - 1.0 K/uL    Eos # 0.5 0.0 - 0.5 K/uL    Baso # 0.03 0.00 - 0.20 K/uL    nRBC 0 0 /100 WBC    Gran % 62.0 38.0 - 73.0 %    Lymph % 10.6 (L) 18.0 - 48.0 %    Mono % 19.1 (H) 4.0 - 15.0 %    Eosinophil % 7.3 0.0 - 8.0 %    Basophil % 0.5 0.0 - 1.9 %    Differential Method Automated    Comprehensive metabolic panel    Collection  Time: 10/12/24  8:27 PM   Result Value Ref Range    Sodium 137 136 - 145 mmol/L    Potassium 5.4 (H) 3.5 - 5.1 mmol/L    Chloride 96 95 - 110 mmol/L    CO2 19 (L) 23 - 29 mmol/L    Glucose 89 70 - 110 mg/dL    BUN 84 (H) 6 - 20 mg/dL    Creatinine 16.8 (H) 0.5 - 1.4 mg/dL    Calcium 8.8 8.7 - 10.5 mg/dL    Total Protein 7.5 6.0 - 8.4 g/dL    Albumin 3.6 3.5 - 5.2 g/dL    Total Bilirubin 0.9 0.1 - 1.0 mg/dL    Alkaline Phosphatase 203 (H) 55 - 135 U/L    AST 39 10 - 40 U/L    ALT 34 10 - 44 U/L    eGFR 3 (A) >60 mL/min/1.73 m^2    Anion Gap 22 (H) 8 - 16 mmol/L   Magnesium    Collection Time: 10/12/24  8:27 PM   Result Value Ref Range    Magnesium 2.4 1.6 - 2.6 mg/dL   Troponin I    Collection Time: 10/12/24  8:27 PM   Result Value Ref Range    Troponin I 0.095 (H) 0.000 - 0.026 ng/mL   Brain natriuretic peptide    Collection Time: 10/12/24  8:27 PM   Result Value Ref Range    BNP 2,973 (H) 0 - 99 pg/mL   Phosphorus    Collection Time: 10/12/24  8:27 PM   Result Value Ref Range    Phosphorus 9.1 (HH) 2.7 - 4.5 mg/dL       Imaging Results:  Imaging Results              X-Ray Chest AP Portable (Final result)  Result time 10/12/24 20:46:37      Final result by Braulio Willams MD (10/12/24 20:46:37)                   Impression:      No acute abnormality.      Electronically signed by: Braulio Willams  Date:    10/12/2024  Time:    20:46               Narrative:    EXAMINATION:  XR CHEST AP PORTABLE    CLINICAL HISTORY:  sob;    TECHNIQUE:  Single frontal view of the chest was performed.    COMPARISON:  None    FINDINGS:  The lungs are clear, with normal appearance of pulmonary vasculature and no pleural effusion or pneumothorax.    Cardiomegaly.  The hilar and mediastinal contours are unremarkable.    Bones are intact.                                       The EKG was ordered, reviewed, and independently interpreted by the ED provider.  Interpretation time: 20:27  Rate: 65 BPM  Rhythm:  Sinus rhythm with 1st degree AV  block  Interpretation: Possible inferior infarct, age undetermined. Anterior infarct, age undetermined. No STEMI.           The Emergency Provider reviewed the vital signs and test results, which are outlined above.     ED Discussion     9:34 PM: Discussed pt's case with Dr. Jimbo Veras MD (Nephrology) who agrees with admission for non-emergent dialysis and will see pt in the morning.    9:59 PM: Discussed case with Marcella Zheng MD ([facility]). Dr. Zheng agrees with current care and management of pt and accepts admission.   Admitting Service: [facility] medicine  Admitting Physician: Dr. Zheng  Admit to: inpt med/tele    9:59 PM: Re-evaluated pt. I have discussed test results, shared treatment plan, and the need for admission with patient and family at bedside. Pt and family express understanding at this time and agree with all information. All questions answered. Pt and family have no further questions or concerns at this time. Pt is ready for admit.           Medical Decision Making  59-year-old male presents with swelling, facial wounds, and shortness of breath secondary to uremia.  He has a history of end-stage renal disease on HD Tuesday, Thursday, Saturday.  Patient states he last dialyzed 1 week ago due to transportation issues.  He was also noncompliant with his blood pressure medication.  Blood pressure acutely elevated.  No respiratory distress or hypoxia.  No signs of pulmonary edema on chest x-ray.  EKG shows first-degree AV block and troponin is elevated as well as BNP.  H&H is at baseline.  Potassium 5.4.  Phosphorus 9.1.  Magnesium normal at 2.4.  Patient is oliguric.  Patient will need to be admitted for dialysis but I do not see anything on his workup or vital signs that would indicate emergent dialysis.  Discussed with nephrology on-call who agrees and will see in the a.m. Patient does have skin findings of uremia on his face.          Amount and/or Complexity of Data  Reviewed  Labs: ordered. Decision-making details documented in ED Course.  Radiology: ordered. Decision-making details documented in ED Course.  ECG/medicine tests: ordered and independent interpretation performed. Decision-making details documented in ED Course.    Risk  Prescription drug management.  Decision regarding hospitalization.    Critical Care  Total time providing critical care: 75 minutes       Additional MDM:   Differential Diagnosis:   Acidosis, hyperkalemia, uremia, hypercalcemia, pulmonary edema, severe hypertension, uremia             ED Medication(s):  Medications   amLODIPine tablet 10 mg (10 mg Oral Given 10/12/24 2142)   carvediloL tablet 12.5 mg (12.5 mg Oral Given 10/12/24 2142)   hydrALAZINE tablet 100 mg (100 mg Oral Given 10/12/24 2142)       New Prescriptions    No medications on file               Scribe Attestation:   Scribe #1: I performed the above scribed service and the documentation accurately describes the services I performed. I attest to the accuracy of the note.     Attending:   Physician Attestation Statement for Scribe #1: I, Roxanna Vázquez DO, personally performed the services described in this documentation, as scribed by Vivian Stringer, in my presence, and it is both accurate and complete.           Clinical Impression       ICD-10-CM ICD-9-CM   1. End stage renal disease on dialysis  N18.6 585.6    Z99.2 V45.11   2. SOB (shortness of breath)  R06.02 786.05   3. Hyperphosphatemia associated with renal failure  E83.39 275.3    N19 586   4. Noncompliance  Z91.199 V15.81   5. Hyperkalemia  E87.5 276.7   6. NSTEMI (non-ST elevated myocardial infarction)  I21.4 410.70   7. Primary hypertension  I10 401.9       Disposition:   Disposition: Admitted  Condition: Stable        Roxanna Vázquez DO  10/12/24 2212

## 2024-10-13 NOTE — ASSESSMENT & PLAN NOTE
-likely uremic pruritus and/or calcinosis cutis in the setting of end-stage renal disease  -calciphylaxis unlikely given appearance of facial rash    -we will trial application of moisturizer and application of mupirocin ointment to open lesions  -wound care consult given open lesions  -p.r.n. Atarax

## 2024-10-13 NOTE — ASSESSMENT & PLAN NOTE
Demand ischemia   History of ESRD, missed 2 days of dialysis, currently volume overloaded  Echo with normal EF, moderate-to-severe TR- due to volume overload

## 2024-10-13 NOTE — H&P
"  [facility] - Emergency Dept.  [facility] Medicine  History & Physical    Patient Name: David Guzman  MRN: [id number]  Patient Class: IP- Inpatient  Admission Date: 10/12/2024  Attending Physician:  Marcella Zheng MD  Primary Care Provider: Martha Chung MD         Patient information was obtained from patient, ER records, and ER physician .     Subjective:     Principal Problem:ESRD needing dialysis    Chief Complaint:   Chief Complaint   Patient presents with    Leg Swelling     Pt moved and has not been to dialysis since "week before last" c/o SOB, and bilateral leg swelling with weeping scabs on face. States "phosphorous is too high so I'm leaking"         HPI: 59-year-old man with history of end-stage renal disease on hemodialysis TTS, Goodpasture syndrome, hypertension, BPH, gastroesophageal reflux disease, obesity, right upper extremity cellulitis, cocaine abuse, substance abuse, gastroesophageal reflux disease, and obesity who presents to the emergency department with complaint of shortness of breath and swelling everywhere over the last several days.  Symptoms are constant, moderate-to-severe in nature, and with no aggravating or alleviating factor identified.  Patient has missed dialysis over the last week as he has recently moved and has been having transportation issues.  Patient does admit that he is noncompliant with his home medications to include antihypertensives.  Patient denies any associated chest pain, cough, nausea, vomiting, fevers, chills, diarrhea, constipation.  Patient reports he does still produce a very small amount of urine on occasion.  Abdomen is significantly distended but without abdominal pain.  Patient also has a facial rash that is pruritic; he has been scratching it and has some/minimal active bleeding appreciated.  Abnormal labs include hemoglobin 9.4, hematocrit 27.5, platelets 101, potassium 5.4, CO2 19, BUN 84, creatinine 16.8, phosphorus 9.1, alk phos 203, BNP 2973, " and troponin 0.095.  ER physician did reach out to Nephrology who does not feel that patient requires emergent hemodialysis.    Last hospital admit 2/1-02/02/2024 for right upper extremity cellulitis requiring IV vancomycin and discharged home with oral doxycycline.    Past Medical History:   Diagnosis Date    Alcohol abuse     Anxiety     BPH (benign prostatic hyperplasia)     Cocaine abuse     Drug abuse     ESRD (end stage renal disease) on dialysis     GERD (gastroesophageal reflux disease)     Hypertension 2/2/2024    Obesity (BMI 30-39.9)     Renal disorder     Tobacco use        Past Surgical History:   Procedure Laterality Date    HERNIA REPAIR         Review of patient's allergies indicates:  No Known Allergies    No current facility-administered medications on file prior to encounter.     Current Outpatient Medications on File Prior to Encounter   Medication Sig    amLODIPine (NORVASC) 10 MG tablet Take 1 tablet by mouth once daily.    carvediloL (COREG) 12.5 MG tablet Take 1 tablet by mouth 2 (two) times daily.    hydrALAZINE (APRESOLINE) 100 MG tablet Take 100 mg by mouth 3 (three) times daily.    tamsulosin (FLOMAX) 0.4 mg Cap Take 0.4 mg by mouth once daily.    aspirin (ECOTRIN) 81 MG EC tablet Take 1 tablet (81 mg total) by mouth once daily. for 14 days    HYDROcodone-acetaminophen (NORCO) 5-325 mg per tablet Take 1 tablet by mouth every 8 (eight) hours as needed for Pain.    VELPHORO 500 mg Chew Take 2 tablets by mouth 3 (three) times daily with meals.     Family History    None       Tobacco Use    Smoking status: Former    Smokeless tobacco: Never   Substance and Sexual Activity    Alcohol use: Yes     Comment: occasionally    Drug use: Yes     Types: Cocaine    Sexual activity: Not Currently     Review of Systems   Constitutional:  Negative for chills and fever.   Respiratory:  Positive for shortness of breath. Negative for cough.    Cardiovascular:  Positive for leg swelling (gross anasarca).  Negative for chest pain.   Gastrointestinal:  Positive for abdominal distention. Negative for abdominal pain, constipation, diarrhea, nausea and vomiting.   Skin:  Positive for rash.   All other systems reviewed and are negative.    Objective:     Vital Signs (Most Recent):  Temp: 97.3 °F (36.3 °C) (10/12/24 1913)  Pulse: 66 (10/12/24 2301)  Resp: (!) 21 (10/12/24 2105)  BP: (!) 171/96 (10/12/24 2301)  SpO2: (!) 94 % (10/12/24 2301) Vital Signs (24h Range):  Temp:  [97.3 °F (36.3 °C)] 97.3 °F (36.3 °C)  Pulse:  [65-67] 66  Resp:  [21-24] 21  SpO2:  [94 %-96 %] 94 %  BP: (171-197)/() 171/96     Weight: 99.8 kg (220 lb)  Body mass index is 34.46 kg/m².     Physical Exam  Vitals reviewed.   Constitutional:       Appearance: He is obese. He is ill-appearing.   HENT:      Nose: Nose normal.   Eyes:      Conjunctiva/sclera: Conjunctivae normal.   Cardiovascular:      Rate and Rhythm: Normal rate.   Pulmonary:      Effort: Respiratory distress (mild tachypnea) present.      Breath sounds: No wheezing.   Abdominal:      General: There is distension.      Tenderness: There is no abdominal tenderness.   Musculoskeletal:         General: Swelling present.      Right lower leg: Edema present.      Left lower leg: Edema present.      Comments: Facial rash with pruritus and minimal bleeding related to patient scratching   Skin:     General: Skin is warm and dry.   Neurological:      Mental Status: He is alert and oriented to person, place, and time.   Psychiatric:         Mood and Affect: Mood normal.         Behavior: Behavior normal.      Comments: Drowsy but arousable                Significant Labs: All pertinent labs within the past 24 hours have been reviewed.  Recent Lab Results         10/12/24  2027        Albumin 3.6              ALT 34       Anion Gap 22       AST 39       Baso # 0.03       Basophil % 0.5       BILIRUBIN TOTAL 0.9  Comment: For infants and newborns, interpretation of results should be  based  on gestational age, weight and in agreement with clinical  observations.    Premature Infant recommended reference ranges:  Up to 24 hours.............<8.0 mg/dL  Up to 48 hours............<12.0 mg/dL  3-5 days..................<15.0 mg/dL  6-29 days.................<15.0 mg/dL         BNP 2,973  Comment: Values of less than 100 pg/ml are consistent with non-CHF populations.       BUN 84       Calcium 8.8       Chloride 96       CO2 19       Creatinine 16.8       Differential Method Automated       eGFR 3       Eos # 0.5       Eos % 7.3       Glucose 89       Gran # (ANC) 3.9       Gran % 62.0       Hematocrit 27.5       Hemoglobin 9.4       Immature Grans (Abs) 0.03  Comment: Mild elevation in immature granulocytes is non specific and   can be seen in a variety of conditions including stress response,   acute inflammation, trauma and pregnancy. Correlation with other   laboratory and clinical findings is essential.         Immature Granulocytes 0.5       Lymph # 0.7       Lymph % 10.6       Magnesium  2.4       MCH 31.9       MCHC 34.2       MCV 93       Mono # 1.2       Mono % 19.1       MPV 11.2       nRBC 0       Phosphorus Level 9.1  Comment: phosphorus critical result(s) called and verbal readback obtained   from kaylyn brennan rn by KATSamuel 10/12/2024 21:25         Platelet Count 101       Potassium 5.4       PROTEIN TOTAL 7.5       RBC 2.95       RDW 17.4       Sodium 137       Troponin I 0.095  Comment: The reference interval for Troponin I represents the 99th percentile   cutoff   for our facility and is consistent with 3rd generation assay   performance.         WBC 6.33               Significant Imaging: I have reviewed all pertinent imaging results/findings within the past 24 hours.  X-Ray Chest AP Portable   Final Result      No acute abnormality.         Electronically signed by: Braulio Willams   Date:    10/12/2024   Time:    20:46         Assessment/Plan:     * ESRD needing dialysis  End-stage  renal disease on HD TTS, uremia, hyperkalemia, hyperphosphatemia, metabolic acidosis, and with Goodpasture syndrome.  Patient has been noncompliant with dialysis x1 week.  BMP reviewed- noted Estimated Creatinine Clearance: 5.5 mL/min (A) (based on SCr of 16.8 mg/dL (H)). according to latest data. Based on current GFR, CKD stage is end stage.  Monitor UOP and serial BMP and adjust therapy as needed. Renally dose meds. Avoid nephrotoxic medications and procedures.  -patient presents with shortness of breath, volume overload, gross anasarca, drowsiness, uremic symptoms  -BUN 84, creatinine 16.8, CO2 19, potassium 5.4, magnesium level 2.4, phosphorus 9.1, calcium 8.8, BNP 2973  -ER physician did reach out to Nephrology who does not feel that patient requires urgent hemodialysis  -give Lasix 80 mg IV x1 dose stat  -O2 supplementation, incentive spirometry, p.r.n. DuoNebs  -continue Velphoro  -give Lokelma 10 g p.o. daily, 1st dose now  -continuous pulse oximetry and cardiac monitoring  -check CBC, renal function panel, and LFTs in a.m.  -consult nephrology    Uremia  See discussion for end-stage renal disease needing dialysis      Volume overload  Volume overload with gross anasarca related to noncompliance with dialysis  -chest x-ray negative  -BNP 2973  -previous echocardiogram 07/11/2023 with EF greater than 65%  -give Lasix 80 mg IV x1 dose now  -nephrology has been consulted for dialysis  -repeat echocardiogram  -consult nephrology and Cardiology      Facial rash  -likely uremic pruritus and/or calcinosis cutis in the setting of end-stage renal disease  -calciphylaxis unlikely given appearance of facial rash  -wound care consult given open lesions  -p.r.n. Atarax      Hypertension  Patients blood pressure range in the last 24 hours was: BP  Min: 171/96  Max: 197/95.The patient's inpatient anti-hypertensive regimen is listed below:  Current Antihypertensives  amLODIPine tablet 10 mg, Daily, Oral  carvediloL tablet  12.5 mg, 2 times daily, Oral  furosemide injection 80 mg, Once, Intravenous  hydrALAZINE injection 10 mg, Every 6 hours PRN, Intravenous  hydrALAZINE tablet 100 mg, 3 times daily, Oral    Plan  - BP is uncontrolled, will adjust as follows:  Continue home medications as patient reports he has been noncompliant with these, 1st dose of each given while in the emergency department  - add p.r.n. IV hydralazine with parameters    Abnormal EKG  See discussion for elevated troponin      Elevated troponin  Elevated troponin with abnormal EKG  -patient denies chest pain does have shortness of breath.  Dyspnea most likely related to volume overload with end-stage renal disease and noncompliance with dialysis  -troponin 0.095, BNP 2973  -chest x-ray negative  -EKG with sinus rhythm with first-degree AV block and questionable inferior/anterior infarct that is age undetermined  -continue aspirin and Coreg  -check serial cardiac enzymes  -check echocardiogram  -telemetry monitoring  -cardiology consult for a.m.      Obesity (BMI 30.0-34.9)  BMI 34.46.  Patient would benefit from diet and lifestyle modifications for weight loss.      Elevated alkaline phosphatase level  -patient does have abdominal distention most likely related to gross anasarca, no abdominal tenderness to palpation  -alk phos 203 with remaining LFTs unremarkable  -check GGT and INR  -check right upper quadrant abdominal ultrasound  -check a.m. labs      Thrombocytopenia  The likely etiology of thrombocytopenia is  unclear . The patients 3 most recent labs are listed below.  Recent Labs     10/12/24  2027   *     Plan  - Will transfuse if platelet count is <50k (if undergoing surgical procedure or have active bleeding).  - platelet count was 124 on 02/01/2024 and prior to that labs were 8 years ago with normal platelets  -given concomitant anemia, we will order additional evaluation  -check iron studies, thyroid studies, B12, folate, INR, HIV, acute  hepatitis panel, lactic acid level, LDH, and haptoglobin  -check CBC in a.m.      Normocytic anemia  Anemia is likely due to chronic disease due to ESRD. Most recent hemoglobin and hematocrit are listed below.  Recent Labs     10/12/24  2027   HGB 9.4*   HCT 27.5*     Plan  - Monitor serial CBC: Daily  - Transfuse PRBC if patient becomes hemodynamically unstable, symptomatic or H/H drops below 7/21.  - Patient has not received any PRBC transfusions to date  - Patient's anemia is currently  pending reassessment  - check iron studies, B12, folate, TSH, free T4, and INR    H/O: substance abuse  Patient denies any illicit drug use.  Check UDS and alcohol level.        VTE Risk Mitigation (From admission, onward)           Ordered     heparin (porcine) injection 5,000 Units  Every 8 hours         10/12/24 2313     IP VTE HIGH RISK PATIENT  Once         10/12/24 2313     Place sequential compression device  Until discontinued         10/12/24 2313                                    Marcella Zheng MD  Department of Hospital Medicine  [facility] - Emergency Dept.

## 2024-10-13 NOTE — CONSULTS
David Guzman is a  59 year old male with a past medical history of end-stage renal disease on hemodialysis TTS, Goodpasture syndrome, hypertension, BPH, gastroesophageal reflux disease, obesity, right upper extremity cellulitis, cocaine abuse, substance abuse, gastroesophageal reflux disease, and obesity. He presents with notable abdominal swelling and SOB. Patient reports that he missed dialysis twice due to personal issues involving relocation and inability to get to appointments. Patient reports SOB and tightness in his stomach, but denies any issues laying flat or waking up SOB. He also states that he has not been urinating as much. He denies history of any cardiac issues. Patient has been receiving dialysis for over a year. Dialysis planned for today due to notable swelling and SOB. Potassium elevated at 5.7. Bicarb low at 17. Creatinine up at 17.1. Troponin trended up from 0.098 to 0.104. BNP elevated at 2,973.     EKG:Sinus rhythm with 1st degree A-V block   Possible Inferior infarct ,age undetermined   Anterior infarct ,age undetermined   Abnormal ECG   When compared with ECG of 06-NOV-2015 23:28,   NC interval has increased   Vent. rate has decreased BY  56 BPM   Anterior infarct is now Present   Borderline criteria for Inferior infarct are now Present

## 2024-10-13 NOTE — ASSESSMENT & PLAN NOTE
Volume overload with gross anasarca related to noncompliance with dialysis  -chest x-ray negative  -BNP 2973  -previous echocardiogram 07/11/2023 with EF greater than 65%  -give Lasix 80 mg IV x1 dose now  -nephrology has been consulted for dialysis  -repeat echocardiogram  -consult nephrology and Cardiology

## 2024-10-13 NOTE — CONSULTS
Nephrology Consultation  Consulting Physician:  Norm  Reason for consultation:  Evaluate ESRD patient for acute dialysis  Informants:  Medical records     History of Present Illness   The patient is a 59 y.o. male with a hx of ESRD ([facility]) who presented to the emergency room with shortness of breath and lower extremity edema and Nephrology has been asked to evaluate.    Patient's story is that he was last seen on dialysis on 10/02/2024 by my partner, Dr. Valentin.  His last dialysis session was on 10/05/2024.  According to that note, patient is homeless.  His medical history is pertinent for Goodpasture syndrome, substance abuse, GERD.    Upon seeing the patient this morning, he is difficult to arouse but is not short of breath, breathing easily and has a trace amount of bilateral lower extremity edema.  His left upper arm fistula is functional.      PMHx:    Past Medical History:   Diagnosis Date    Alcohol abuse     Anxiety     BPH (benign prostatic hyperplasia)     Cocaine abuse     Drug abuse     ESRD (end stage renal disease) on dialysis     GERD (gastroesophageal reflux disease)     Hypertension 2/2/2024    Obesity (BMI 30-39.9)     Renal disorder     Tobacco use          PSHx:  Past Surgical History:   Procedure Laterality Date    HERNIA REPAIR           SocHx:    Social History     Socioeconomic History    Marital status:    Tobacco Use    Smoking status: Former    Smokeless tobacco: Never   Substance and Sexual Activity    Alcohol use: Yes     Comment: occasionally    Drug use: Yes     Types: Cocaine    Sexual activity: Not Currently     Social Drivers of Health     Financial Resource Strain: High Risk (10/13/2024)    Overall Financial Resource Strain (CARDIA)     Difficulty of Paying Living Expenses: Hard   Food Insecurity: Food Insecurity Present (10/13/2024)    Hunger Vital Sign     Worried About Running Out of Food in the Last Year: Often true     Ran Out of Food in the Last  "Year: Often true   Transportation Needs: Unmet Transportation Needs (10/13/2024)    TRANSPORTATION NEEDS     Transportation : Yes, it has kept me from medical appointments or from getting my medications.   Stress: Stress Concern Present (10/13/2024)    [employer] - Occupational Stress Questionnaire     Feeling of Stress : To some extent   Housing Stability: High Risk (10/13/2024)    Housing Stability Vital Sign     Unable to Pay for Housing in the Last Year: Yes     Homeless in the Last Year: No         FamHx:    Family History   Problem Relation Name Age of Onset    COPD Neg Hx           ROS:    Review of Systems   Unable to perform ROS: Mental acuity        Allergies:    has No Known Allergies.    Current medications:   Scheduled Meds:   amLODIPine  10 mg Oral Daily    aspirin  81 mg Oral Daily    carvediloL  12.5 mg Oral BID    heparin (porcine)  5,000 Units Subcutaneous Q8H    hydrALAZINE  100 mg Oral TID    sodium zirconium cyclosilicate  10 g Oral Daily    sucroferric oxyhydroxide  2 tablet Oral TID WM    tamsulosin  0.4 mg Oral Daily     Continuous Infusions:  PRN Meds:.  Current Facility-Administered Medications:     acetaminophen, 650 mg, Oral, Q6H PRN    albuterol-ipratropium, 3 mL, Nebulization, Q6H PRN    dextrose 10%, 12.5 g, Intravenous, PRN    dextrose 10%, 25 g, Intravenous, PRN    glucagon (human recombinant), 1 mg, Intramuscular, PRN    glucose, 16 g, Oral, PRN    glucose, 24 g, Oral, PRN    hydrALAZINE, 10 mg, Intravenous, Q6H PRN    hydrOXYzine HCL, 25 mg, Oral, TID PRN    naloxone, 0.02 mg, Intravenous, PRN    ondansetron, 4 mg, Intravenous, Q6H PRN    sodium chloride 0.9%, 10 mL, Intravenous, Q12H PRN       Physical Examination    VS/Measurements   BP (!) 151/86 (Patient Position: Lying)   Pulse (!) 58   Temp 97.6 °F (36.4 °C) (Oral)   Resp 16   Ht 5' 7" (1.702 m)   Wt 101.3 kg (223 lb 5.2 oz)   SpO2 99%   BMI 34.98 kg/m²     Physical Exam  Vitals reviewed. "   Constitutional:       General: He is not in acute distress.     Appearance: He is ill-appearing.   HENT:      Head: Atraumatic.      Nose: No rhinorrhea.   Cardiovascular:      Rate and Rhythm: Normal rate.      Heart sounds: Normal heart sounds.      No friction rub.   Pulmonary:      Effort: Pulmonary effort is normal. No respiratory distress.      Breath sounds: No stridor. No wheezing.   Abdominal:      General: There is no distension.      Palpations: Abdomen is soft.      Tenderness: There is no abdominal tenderness.   Musculoskeletal:         General: Swelling present.      Comments: Left upper arm with auscultated thrill.   Lymphadenopathy:      Cervical: No cervical adenopathy.   Neurological:      Mental Status: He is lethargic.   Psychiatric:         Mood and Affect: Affect is flat.         Behavior: Behavior is withdrawn. Behavior is not combative.              Laboratory Results   Today's Lab Results :    Recent Results (from the past 24 hours)   CBC auto differential    Collection Time: 10/12/24  8:27 PM   Result Value Ref Range    WBC 6.33 3.90 - 12.70 K/uL    RBC 2.95 (L) 4.60 - 6.20 M/uL    Hemoglobin 9.4 (L) 14.0 - 18.0 g/dL    Hematocrit 27.5 (L) 40.0 - 54.0 %    MCV 93 82 - 98 fL    MCH 31.9 (H) 27.0 - 31.0 pg    MCHC 34.2 32.0 - 36.0 g/dL    RDW 17.4 (H) 11.5 - 14.5 %    Platelets 101 (L) 150 - 450 K/uL    MPV 11.2 9.2 - 12.9 fL    Immature Granulocytes 0.5 0.0 - 0.5 %    Gran # (ANC) 3.9 1.8 - 7.7 K/uL    Immature Grans (Abs) 0.03 0.00 - 0.04 K/uL    Lymph # 0.7 (L) 1.0 - 4.8 K/uL    Mono # 1.2 (H) 0.3 - 1.0 K/uL    Eos # 0.5 0.0 - 0.5 K/uL    Baso # 0.03 0.00 - 0.20 K/uL    nRBC 0 0 /100 WBC    Gran % 62.0 38.0 - 73.0 %    Lymph % 10.6 (L) 18.0 - 48.0 %    Mono % 19.1 (H) 4.0 - 15.0 %    Eosinophil % 7.3 0.0 - 8.0 %    Basophil % 0.5 0.0 - 1.9 %    Differential Method Automated    Comprehensive metabolic panel    Collection Time: 10/12/24  8:27 PM   Result Value Ref Range    Sodium 137 136 -  145 mmol/L    Potassium 5.4 (H) 3.5 - 5.1 mmol/L    Chloride 96 95 - 110 mmol/L    CO2 19 (L) 23 - 29 mmol/L    Glucose 89 70 - 110 mg/dL    BUN 84 (H) 6 - 20 mg/dL    Creatinine 16.8 (H) 0.5 - 1.4 mg/dL    Calcium 8.8 8.7 - 10.5 mg/dL    Total Protein 7.5 6.0 - 8.4 g/dL    Albumin 3.6 3.5 - 5.2 g/dL    Total Bilirubin 0.9 0.1 - 1.0 mg/dL    Alkaline Phosphatase 203 (H) 55 - 135 U/L    AST 39 10 - 40 U/L    ALT 34 10 - 44 U/L    eGFR 3 (A) >60 mL/min/1.73 m^2    Anion Gap 22 (H) 8 - 16 mmol/L   Magnesium    Collection Time: 10/12/24  8:27 PM   Result Value Ref Range    Magnesium 2.4 1.6 - 2.6 mg/dL   Troponin I    Collection Time: 10/12/24  8:27 PM   Result Value Ref Range    Troponin I 0.095 (H) 0.000 - 0.026 ng/mL   Brain natriuretic peptide    Collection Time: 10/12/24  8:27 PM   Result Value Ref Range    BNP 2,973 (H) 0 - 99 pg/mL   Phosphorus    Collection Time: 10/12/24  8:27 PM   Result Value Ref Range    Phosphorus 9.1 (HH) 2.7 - 4.5 mg/dL   Ethanol    Collection Time: 10/12/24 11:43 PM   Result Value Ref Range    Alcohol, Serum <10 <10 mg/dL   Lactic acid, plasma    Collection Time: 10/12/24 11:43 PM   Result Value Ref Range    Lactate (Lactic Acid) 0.6 0.5 - 2.2 mmol/L   Troponin I    Collection Time: 10/12/24 11:43 PM   Result Value Ref Range    Troponin I 0.098 (H) 0.000 - 0.026 ng/mL   Ferritin    Collection Time: 10/12/24 11:43 PM   Result Value Ref Range    Ferritin 596 (H) 20.0 - 300.0 ng/mL   TSH    Collection Time: 10/12/24 11:43 PM   Result Value Ref Range    TSH 4.534 (H) 0.400 - 4.000 uIU/mL   T4, free    Collection Time: 10/12/24 11:43 PM   Result Value Ref Range    Free T4 0.88 0.71 - 1.51 ng/dL   HIV 1/2 Ag/Ab (4th Gen)    Collection Time: 10/12/24 11:43 PM   Result Value Ref Range    HIV 1/2 Ag/Ab Negative Negative   Protime-INR    Collection Time: 10/12/24 11:43 PM   Result Value Ref Range    Prothrombin Time 11.8 9.0 - 12.5 sec    INR 1.0 0.8 - 1.2   Lactate dehydrogenase    Collection  Time: 10/12/24 11:43 PM   Result Value Ref Range     (H) 110 - 260 U/L   CBC Auto Differential    Collection Time: 10/13/24  2:56 AM   Result Value Ref Range    WBC 5.87 3.90 - 12.70 K/uL    RBC 2.86 (L) 4.60 - 6.20 M/uL    Hemoglobin 9.3 (L) 14.0 - 18.0 g/dL    Hematocrit 27.1 (L) 40.0 - 54.0 %    MCV 95 82 - 98 fL    MCH 32.5 (H) 27.0 - 31.0 pg    MCHC 34.3 32.0 - 36.0 g/dL    RDW 17.4 (H) 11.5 - 14.5 %    Platelets 102 (L) 150 - 450 K/uL    MPV 10.8 9.2 - 12.9 fL    Immature Granulocytes 0.3 0.0 - 0.5 %    Gran # (ANC) 3.5 1.8 - 7.7 K/uL    Immature Grans (Abs) 0.02 0.00 - 0.04 K/uL    Lymph # 0.6 (L) 1.0 - 4.8 K/uL    Mono # 1.2 (H) 0.3 - 1.0 K/uL    Eos # 0.5 0.0 - 0.5 K/uL    Baso # 0.04 0.00 - 0.20 K/uL    nRBC 0 0 /100 WBC    Gran % 59.8 38.0 - 73.0 %    Lymph % 10.4 (L) 18.0 - 48.0 %    Mono % 20.1 (H) 4.0 - 15.0 %    Eosinophil % 8.7 (H) 0.0 - 8.0 %    Basophil % 0.7 0.0 - 1.9 %    Differential Method Automated    Renal Function Panel    Collection Time: 10/13/24  2:56 AM   Result Value Ref Range    Glucose 87 70 - 110 mg/dL    Sodium 136 136 - 145 mmol/L    Potassium 5.6 (H) 3.5 - 5.1 mmol/L    Chloride 96 95 - 110 mmol/L    CO2 18 (L) 23 - 29 mmol/L    BUN 89 (H) 6 - 20 mg/dL    Calcium 8.7 8.7 - 10.5 mg/dL    Creatinine 16.9 (H) 0.5 - 1.4 mg/dL    Albumin 3.5 3.5 - 5.2 g/dL    Phosphorus 9.5 (HH) 2.7 - 4.5 mg/dL    eGFR 3 (A) >60 mL/min/1.73 m^2    Anion Gap 22 (H) 8 - 16 mmol/L   Hepatic function panel    Collection Time: 10/13/24  2:56 AM   Result Value Ref Range    Total Protein 7.4 6.0 - 8.4 g/dL    Albumin 3.5 3.5 - 5.2 g/dL    Total Bilirubin 0.9 0.1 - 1.0 mg/dL    Bilirubin, Direct 0.5 (H) 0.1 - 0.3 mg/dL    AST 36 10 - 40 U/L    ALT 30 10 - 44 U/L    Alkaline Phosphatase 187 (H) 55 - 135 U/L   Troponin I    Collection Time: 10/13/24  2:56 AM   Result Value Ref Range    Troponin I 0.104 (H) 0.000 - 0.026 ng/mL        Assessment and Plan   ESR--[facility] TTS.  No acute need for  dialysis today.  Patient will receive dialysis in a.m..    Mild hyperkalemia.  Will correct with dialysis in a.m..    Anemia of chronic kidney disease--received Mircera 225 mcg on 10/01/2024.  Iron saturation 23%.      Metabolic bone disease of renal origin.  .  Binders have been restarted.    Hypertension with CKD-home medicines have been restarted.    History of Goodpasture's.      ________________________________________________  Jimbo Veras

## 2024-10-13 NOTE — SUBJECTIVE & OBJECTIVE
Past Medical History:   Diagnosis Date    Alcohol abuse     Anxiety     BPH (benign prostatic hyperplasia)     Cocaine abuse     Drug abuse     ESRD (end stage renal disease) on dialysis     GERD (gastroesophageal reflux disease)     Hypertension 2/2/2024    Obesity (BMI 30-39.9)     Renal disorder     Tobacco use        Past Surgical History:   Procedure Laterality Date    HERNIA REPAIR         Review of patient's allergies indicates:  No Known Allergies    No current facility-administered medications on file prior to encounter.     Current Outpatient Medications on File Prior to Encounter   Medication Sig    amLODIPine (NORVASC) 10 MG tablet Take 1 tablet by mouth once daily.    carvediloL (COREG) 12.5 MG tablet Take 1 tablet by mouth 2 (two) times daily.    hydrALAZINE (APRESOLINE) 100 MG tablet Take 100 mg by mouth 3 (three) times daily.    tamsulosin (FLOMAX) 0.4 mg Cap Take 0.4 mg by mouth once daily.    aspirin (ECOTRIN) 81 MG EC tablet Take 1 tablet (81 mg total) by mouth once daily. for 14 days    HYDROcodone-acetaminophen (NORCO) 5-325 mg per tablet Take 1 tablet by mouth every 8 (eight) hours as needed for Pain.    VELPHORO 500 mg Chew Take 2 tablets by mouth 3 (three) times daily with meals.     Family History    None       Tobacco Use    Smoking status: Former    Smokeless tobacco: Never   Substance and Sexual Activity    Alcohol use: Yes     Comment: occasionally    Drug use: Yes     Types: Cocaine    Sexual activity: Not Currently     Review of Systems   Constitutional: Positive for malaise/fatigue. Negative for diaphoresis, weight gain and weight loss.   HENT:  Negative for congestion and nosebleeds.    Cardiovascular:  Positive for leg swelling. Negative for chest pain, claudication, cyanosis, dyspnea on exertion, irregular heartbeat, near-syncope, orthopnea, palpitations, paroxysmal nocturnal dyspnea and syncope.   Respiratory:  Positive for shortness of breath. Negative for cough, hemoptysis,  sleep disturbances due to breathing, snoring, sputum production and wheezing.    Hematologic/Lymphatic: Negative for bleeding problem. Does not bruise/bleed easily.   Skin:  Negative for rash.   Musculoskeletal:  Negative for arthritis, back pain, falls, joint pain, muscle cramps and muscle weakness.   Gastrointestinal:  Positive for bloating. Negative for abdominal pain, constipation, diarrhea, heartburn, hematemesis, hematochezia, melena, nausea and vomiting.   Genitourinary:  Negative for dysuria, hematuria and nocturia.   Neurological:  Negative for excessive daytime sleepiness, dizziness, headaches, light-headedness, loss of balance, numbness, vertigo and weakness.     Objective:     Vital Signs (Most Recent):  Temp: 97.6 °F (36.4 °C) (10/13/24 0841)  Pulse: (!) 58 (10/13/24 1111)  Resp: 16 (10/13/24 0841)  BP: (!) 151/86 (10/13/24 0841)  SpO2: 95 % (10/13/24 1111) Vital Signs (24h Range):  Temp:  [97.3 °F (36.3 °C)-97.6 °F (36.4 °C)] 97.6 °F (36.4 °C)  Pulse:  [58-67] 58  Resp:  [16-24] 16  SpO2:  [93 %-99 %] 95 %  BP: (146-197)/() 151/86     Weight: 101.3 kg (223 lb 5.2 oz)  Body mass index is 34.98 kg/m².    SpO2: 95 %       No intake or output data in the 24 hours ending 10/13/24 1720    Lines/Drains/Airways       Peripheral Intravenous Line  Duration                  Hemodialysis AV Fistula 10/13/24 0559 Left upper arm <1 day         Peripheral IV - Single Lumen 10/12/24 2028 20 G Anterior;Right Forearm <1 day                     Physical Exam  Vitals and nursing note reviewed.   Constitutional:       Appearance: He is well-developed. He is obese. He is ill-appearing and toxic-appearing.   HENT:      Head: Normocephalic.      Mouth/Throat:      Mouth: Mucous membranes are moist.   Neck:      Vascular: No carotid bruit or JVD.   Cardiovascular:      Rate and Rhythm: Normal rate and regular rhythm.      Pulses: Normal pulses.      Heart sounds: Normal heart sounds. No murmur heard.     No friction rub.  "  Pulmonary:      Effort: Respiratory distress present.      Breath sounds: Rales present. No wheezing.   Abdominal:      General: Bowel sounds are normal. There is no distension.      Palpations: Abdomen is soft.      Tenderness: There is no abdominal tenderness. There is no guarding.   Musculoskeletal:         General: Swelling present. No tenderness.      Cervical back: Neck supple. No tenderness.      Right lower leg: No edema.      Left lower leg: No edema.   Skin:     General: Skin is warm and dry.      Capillary Refill: Capillary refill takes less than 2 seconds.      Findings: No rash.   Neurological:      General: No focal deficit present.      Mental Status: He is alert and oriented to person, place, and time.   Psychiatric:         Mood and Affect: Mood normal.         Behavior: Behavior normal.         Thought Content: Thought content normal.          Significant Labs: BMP:   Recent Labs   Lab 10/12/24  2027 10/13/24  0256 10/13/24  0935   GLU 89 87 84    136 137   K 5.4* 5.6* 5.7*   CL 96 96 97   CO2 19* 18* 17*   BUN 84* 89* 92*   CREATININE 16.8* 16.9* 17.1*   CALCIUM 8.8 8.7 8.8   MG 2.4  --  2.4   , CMP   Recent Labs   Lab 10/12/24  2027 10/13/24  0256 10/13/24  0935    136 137   K 5.4* 5.6* 5.7*   CL 96 96 97   CO2 19* 18* 17*   GLU 89 87 84   BUN 84* 89* 92*   CREATININE 16.8* 16.9* 17.1*   CALCIUM 8.8 8.7 8.8   PROT 7.5 7.4 7.5   ALBUMIN 3.6 3.5  3.5 3.5   BILITOT 0.9 0.9 0.9   ALKPHOS 203* 187* 182*   AST 39 36 37   ALT 34 30 30   ANIONGAP 22* 22* 23*   , CBC   Recent Labs   Lab 10/12/24  2027 10/13/24  0256 10/13/24  0935   WBC 6.33 5.87 4.96   HGB 9.4* 9.3* 9.7*   HCT 27.5* 27.1* 29.1*   * 102* 104*   , INR   Recent Labs   Lab 10/12/24  2343   INR 1.0   , Lipid Panel No results for input(s): "CHOL", "HDL", "LDLCALC", "TRIG", "CHOLHDL" in the last 48 hours., and Troponin   Recent Labs   Lab 10/12/24  2027 10/12/24  2343 10/13/24  0256   TROPONINI 0.095* 0.098* 0.104* "       Significant Imaging: Echocardiogram: 2D echo with color flow doppler: No results found for this or any previous visit. and Transthoracic echo (TTE) complete (Cupid Only):   Results for orders placed or performed during the hospital encounter of 10/12/24   Echo   Result Value Ref Range    BSA 2.19 m2    LVOT stroke volume 117.4 cm3    LVIDd 5.3 3.5 - 6.0 cm    LV Systolic Volume 45.35 mL    LV Systolic Volume Index 21.4 mL/m2    LVIDs 3.3 2.1 - 4.0 cm    LV ESV A2C 92.74 mL    LV Diastolic Volume 135.65 mL    LV ESV A4C 80.16 mL    LV Diastolic Volume Index 63.99 mL/m2    Left Ventricular End Systolic Volume by Teichholz Method 45.35 mL    Left Ventricular End Diastolic Volume by Teichholz Method 135.65 mL    IVS 1.6 (A) 0.6 - 1.1 cm    LVOT diameter 2.0 cm    LVOT area 3.1 cm2    FS 37.7 28 - 44 %    Left Ventricle Relative Wall Thickness 0.53 cm    PW 1.4 (A) 0.6 - 1.1 cm    LV mass 352.5 g    LV Mass Index 166.3 g/m2    MV Peak E Dariel 1.24 m/s    TDI LATERAL 0.11 m/s    TDI SEPTAL 0.11 m/s    E/E' ratio 11.27 m/s    MV Peak A Dariel 1.05 m/s    TR Max Dariel 2.75 m/s    E/A ratio 1.18     IVRT 94.20 msec    E wave deceleration time 376.81 msec    LV SEPTAL E/E' RATIO 11.27 m/s    LV LATERAL E/E' RATIO 11.27 m/s    PV Peak S Dariel 0.56 m/s    PV Peak D Dariel 0.41 m/s    Pulm vein S/D ratio 1.37     LVOT peak dariel 1.4 m/s    Left Ventricular Outflow Tract Mean Velocity 1.02 cm/s    Left Ventricular Outflow Tract Mean Gradient 4.66 mmHg    RV- michael basal diam 5.3 cm    RV-michael mid d 3.2 cm    RV Basal Diameter 7.72 cm    RV-michael length 7.7 cm    RV mid diameter 3.24 cm    RV S' 10.63 cm/s    RVOT peak VTI 19.8 cm    TAPSE 1.33 cm    RV/LV Ratio 1.00 cm    LA size 4.83 cm    Left Atrium Minor Axis 7.46 cm    Left Atrium Major Axis 7.75 cm    LA Vol (MOD) 86.19 mL    HORTENCIA (MOD) 40.7 mL/m2    RA Major Axis 7.28 cm    AV mean gradient 19.7 mmHg    AV peak gradient 31.4 mmHg    Ao peak dariel 2.8 m/s    Ao VTI 76.8 cm    LVOT peak  VTI 37.4 cm    AV valve area 1.5 cm²    AV Velocity Ratio 0.50     AV index (prosthetic) 0.49     JUAN by Velocity Ratio 1.6 cm²    Mr max leo 4.82 m/s    TV mean gradient 19 mmHg    Triscuspid Valve Regurgitation Peak Gradient 30 mmHg    PV mean gradient 1 mmHg    PV PEAK VELOCITY 0.82 m/s    PV peak gradient 3 mmHg    Pulmonary Valve Mean Velocity 0.69 m/s    RVOT peak leo 0.65 m/s    STJ 2.64 cm    Ascending aorta 3.38 cm    IVC diameter 2.77 cm    Mean e' 0.11 m/s    ZLVIDS -1.69     ZLVIDD -2.34     LA area A4C 26.71 cm2    LA area A2C 28.45 cm2    RVDD 4.27 cm    HORTENCIA 55.9 mL/m2    LA Vol 118.60 cm3    LA WIDTH 3.8 cm    RA Width 4.8 cm    EF 65 %    TV resting pulmonary artery pressure 45 mmHg    RV TB RVSP 18 mmHg    Est. RA pres 15 mmHg    Narrative      Left Ventricle: The left ventricle is normal in size. Normal wall   thickness. There is concentric hypertrophy. Normal wall motion. There is   normal systolic function with a visually estimated ejection fraction of 60   - 65%. Ejection fraction is approximately 65%. There is normal diastolic   function.    Right Ventricle: Mild right ventricular enlargement. Systolic function   is normal.    Left Atrium: Left atrium is severely dilated.    Right Atrium: Right atrium is moderately dilated.    Aortic Valve: The aortic valve is a trileaflet valve. Mildly calcified   cusps. Mildly restricted motion. There is mild stenosis. Aortic valve area   by VTI is 1.5 cm². Aortic valve peak velocity is 2.8 m/s. Mean gradient is   19.7 mmHg. The dimensionless index is 0.49.    Mitral Valve: There is mild regurgitation.    Tricuspid Valve: There is moderate to severe regurgitation.    Pulmonary Artery: The estimated pulmonary artery systolic pressure is   45 mmHg.    IVC/SVC: Elevated venous pressure at 15 mmHg.    Pericardium: Ascites present.

## 2024-10-13 NOTE — PLAN OF CARE
MARBELLA unable to complete D/C planning assessment as patient was asleep.     Drea Sevilla LMSW 10/13/2024 12:30 PM

## 2024-10-13 NOTE — HOSPITAL COURSE
Admitted to Hospital Medicine for evaluation of fluid overload concerns in the setting of multiple missed dialysis sessions.  Nephrology consulted for inpatient dialysis.  Troponins remained flat, and patient denied any complaints of chest pains.  Started on Lokelma scheduled for hyperkalemia.  Nephrology with plans for dialysis. Hep C ab reactive, RNA studies pending. Pt denies know h/o Hep C . Echo resulted with EF 60-65%, RA mod dilated, LA severly dialted, mild AS with JOSE 1.5cm2, mod- severe TR PAP 45 mmHG , ascities present. Despite initial dialysis, patient had acute hypoxemic respiratory failure

## 2024-10-13 NOTE — CONSULTS
[facility] ([facility])  Cardiology  Consult Note    Patient Name: David Guzman  MRN: [id number]  Admission Date: 10/12/2024  Hospital Length of Stay: 1 days  Code Status: Full Code   Attending Provider: Eduardo Obrien DO   Consulting Provider: González Reddy MD  Primary Care Physician: Martha Chung MD  Principal Problem:ESRD needing dialysis    Patient information was obtained from patient and ER records.     Inpatient consult to Cardiology  Consult performed by: González Reddy MD  Consult ordered by: Marcella Zheng MD  Reason for consult: Elevated troponin        Subjective:     Chief Complaint:  Shortness of breath     HPI:   David Guzman is a  59 year old male with a past medical history of end-stage renal disease on hemodialysis TTS, Goodpasture syndrome, hypertension, BPH, gastroesophageal reflux disease, obesity, right upper extremity cellulitis, cocaine abuse, substance abuse, gastroesophageal reflux disease, and obesity. He presents with notable abdominal swelling and SOB. Patient reports that he missed dialysis twice due to personal issues involving relocation and inability to get to appointments. Patient reports SOB and tightness in his stomach, but denies any issues laying flat or waking up SOB. He also states that he has not been urinating as much. He denies history of any cardiac issues. Patient has been receiving dialysis for over a year. Dialysis planned for today due to notable swelling and SOB. Potassium elevated at 5.7. Bicarb low at 17. Creatinine up at 17.1. Troponin trended up from 0.098 to 0.104. BNP elevated at 2,973.      EKG:Sinus rhythm with 1st degree A-V block, can not rule out inferior and anterior infarct  Echo EF 65% moderate-to-severe TR, PASP 60 mmHg  Cardiology consulted for elevated troponin.      Past Medical History:   Diagnosis Date    Alcohol abuse     Anxiety     BPH (benign prostatic hyperplasia)     Cocaine abuse     Drug abuse      ESRD (end stage renal disease) on dialysis     GERD (gastroesophageal reflux disease)     Hypertension 2/2/2024    Obesity (BMI 30-39.9)     Renal disorder     Tobacco use        Past Surgical History:   Procedure Laterality Date    HERNIA REPAIR         Review of patient's allergies indicates:  No Known Allergies    No current facility-administered medications on file prior to encounter.     Current Outpatient Medications on File Prior to Encounter   Medication Sig    amLODIPine (NORVASC) 10 MG tablet Take 1 tablet by mouth once daily.    carvediloL (COREG) 12.5 MG tablet Take 1 tablet by mouth 2 (two) times daily.    hydrALAZINE (APRESOLINE) 100 MG tablet Take 100 mg by mouth 3 (three) times daily.    tamsulosin (FLOMAX) 0.4 mg Cap Take 0.4 mg by mouth once daily.    aspirin (ECOTRIN) 81 MG EC tablet Take 1 tablet (81 mg total) by mouth once daily. for 14 days    HYDROcodone-acetaminophen (NORCO) 5-325 mg per tablet Take 1 tablet by mouth every 8 (eight) hours as needed for Pain.    VELPHORO 500 mg Chew Take 2 tablets by mouth 3 (three) times daily with meals.     Family History    None       Tobacco Use    Smoking status: Former    Smokeless tobacco: Never   Substance and Sexual Activity    Alcohol use: Yes     Comment: occasionally    Drug use: Yes     Types: Cocaine    Sexual activity: Not Currently     Review of Systems   Constitutional: Positive for malaise/fatigue. Negative for diaphoresis, weight gain and weight loss.   HENT:  Negative for congestion and nosebleeds.    Cardiovascular:  Positive for leg swelling. Negative for chest pain, claudication, cyanosis, dyspnea on exertion, irregular heartbeat, near-syncope, orthopnea, palpitations, paroxysmal nocturnal dyspnea and syncope.   Respiratory:  Positive for shortness of breath. Negative for cough, hemoptysis, sleep disturbances due to breathing, snoring, sputum production and wheezing.    Hematologic/Lymphatic: Negative for bleeding problem. Does not  bruise/bleed easily.   Skin:  Negative for rash.   Musculoskeletal:  Negative for arthritis, back pain, falls, joint pain, muscle cramps and muscle weakness.   Gastrointestinal:  Positive for bloating. Negative for abdominal pain, constipation, diarrhea, heartburn, hematemesis, hematochezia, melena, nausea and vomiting.   Genitourinary:  Negative for dysuria, hematuria and nocturia.   Neurological:  Negative for excessive daytime sleepiness, dizziness, headaches, light-headedness, loss of balance, numbness, vertigo and weakness.     Objective:     Vital Signs (Most Recent):  Temp: 97.6 °F (36.4 °C) (10/13/24 0841)  Pulse: (!) 58 (10/13/24 1111)  Resp: 16 (10/13/24 0841)  BP: (!) 151/86 (10/13/24 0841)  SpO2: 95 % (10/13/24 1111) Vital Signs (24h Range):  Temp:  [97.3 °F (36.3 °C)-97.6 °F (36.4 °C)] 97.6 °F (36.4 °C)  Pulse:  [58-67] 58  Resp:  [16-24] 16  SpO2:  [93 %-99 %] 95 %  BP: (146-197)/() 151/86     Weight: 101.3 kg (223 lb 5.2 oz)  Body mass index is 34.98 kg/m².    SpO2: 95 %       No intake or output data in the 24 hours ending 10/13/24 1720    Lines/Drains/Airways       Peripheral Intravenous Line  Duration                  Hemodialysis AV Fistula 10/13/24 0559 Left upper arm <1 day         Peripheral IV - Single Lumen 10/12/24 2028 20 G Anterior;Right Forearm <1 day                     Physical Exam  Vitals and nursing note reviewed.   Constitutional:       Appearance: He is well-developed. He is obese. He is ill-appearing and toxic-appearing.   HENT:      Head: Normocephalic.      Mouth/Throat:      Mouth: Mucous membranes are moist.   Neck:      Vascular: No carotid bruit or JVD.   Cardiovascular:      Rate and Rhythm: Normal rate and regular rhythm.      Pulses: Normal pulses.      Heart sounds: Normal heart sounds. No murmur heard.     No friction rub.   Pulmonary:      Effort: Respiratory distress present.      Breath sounds: Rales present. No wheezing.   Abdominal:      General: Bowel sounds  "are normal. There is no distension.      Palpations: Abdomen is soft.      Tenderness: There is no abdominal tenderness. There is no guarding.   Musculoskeletal:         General: Swelling present. No tenderness.      Cervical back: Neck supple. No tenderness.      Right lower leg: No edema.      Left lower leg: No edema.   Skin:     General: Skin is warm and dry.      Capillary Refill: Capillary refill takes less than 2 seconds.      Findings: No rash.   Neurological:      General: No focal deficit present.      Mental Status: He is alert and oriented to person, place, and time.   Psychiatric:         Mood and Affect: Mood normal.         Behavior: Behavior normal.         Thought Content: Thought content normal.          Significant Labs: BMP:   Recent Labs   Lab 10/12/24  2027 10/13/24  0256 10/13/24  0935   GLU 89 87 84    136 137   K 5.4* 5.6* 5.7*   CL 96 96 97   CO2 19* 18* 17*   BUN 84* 89* 92*   CREATININE 16.8* 16.9* 17.1*   CALCIUM 8.8 8.7 8.8   MG 2.4  --  2.4   , CMP   Recent Labs   Lab 10/12/24  2027 10/13/24  0256 10/13/24  0935    136 137   K 5.4* 5.6* 5.7*   CL 96 96 97   CO2 19* 18* 17*   GLU 89 87 84   BUN 84* 89* 92*   CREATININE 16.8* 16.9* 17.1*   CALCIUM 8.8 8.7 8.8   PROT 7.5 7.4 7.5   ALBUMIN 3.6 3.5  3.5 3.5   BILITOT 0.9 0.9 0.9   ALKPHOS 203* 187* 182*   AST 39 36 37   ALT 34 30 30   ANIONGAP 22* 22* 23*   , CBC   Recent Labs   Lab 10/12/24  2027 10/13/24  0256 10/13/24  0935   WBC 6.33 5.87 4.96   HGB 9.4* 9.3* 9.7*   HCT 27.5* 27.1* 29.1*   * 102* 104*   , INR   Recent Labs   Lab 10/12/24  2343   INR 1.0   , Lipid Panel No results for input(s): "CHOL", "HDL", "LDLCALC", "TRIG", "CHOLHDL" in the last 48 hours., and Troponin   Recent Labs   Lab 10/12/24  2027 10/12/24  2343 10/13/24  0256   TROPONINI 0.095* 0.098* 0.104*       Significant Imaging: Echocardiogram: 2D echo with color flow doppler: No results found for this or any previous visit. and Transthoracic echo " (TTE) complete (Cupid Only):   Results for orders placed or performed during the hospital encounter of 10/12/24   Echo   Result Value Ref Range    BSA 2.19 m2    LVOT stroke volume 117.4 cm3    LVIDd 5.3 3.5 - 6.0 cm    LV Systolic Volume 45.35 mL    LV Systolic Volume Index 21.4 mL/m2    LVIDs 3.3 2.1 - 4.0 cm    LV ESV A2C 92.74 mL    LV Diastolic Volume 135.65 mL    LV ESV A4C 80.16 mL    LV Diastolic Volume Index 63.99 mL/m2    Left Ventricular End Systolic Volume by Teichholz Method 45.35 mL    Left Ventricular End Diastolic Volume by Teichholz Method 135.65 mL    IVS 1.6 (A) 0.6 - 1.1 cm    LVOT diameter 2.0 cm    LVOT area 3.1 cm2    FS 37.7 28 - 44 %    Left Ventricle Relative Wall Thickness 0.53 cm    PW 1.4 (A) 0.6 - 1.1 cm    LV mass 352.5 g    LV Mass Index 166.3 g/m2    MV Peak E Dariel 1.24 m/s    TDI LATERAL 0.11 m/s    TDI SEPTAL 0.11 m/s    E/E' ratio 11.27 m/s    MV Peak A Dariel 1.05 m/s    TR Max Dariel 2.75 m/s    E/A ratio 1.18     IVRT 94.20 msec    E wave deceleration time 376.81 msec    LV SEPTAL E/E' RATIO 11.27 m/s    LV LATERAL E/E' RATIO 11.27 m/s    PV Peak S Dariel 0.56 m/s    PV Peak D Dariel 0.41 m/s    Pulm vein S/D ratio 1.37     LVOT peak dariel 1.4 m/s    Left Ventricular Outflow Tract Mean Velocity 1.02 cm/s    Left Ventricular Outflow Tract Mean Gradient 4.66 mmHg    RV- michael basal diam 5.3 cm    RV-michael mid d 3.2 cm    RV Basal Diameter 7.72 cm    RV-michael length 7.7 cm    RV mid diameter 3.24 cm    RV S' 10.63 cm/s    RVOT peak VTI 19.8 cm    TAPSE 1.33 cm    RV/LV Ratio 1.00 cm    LA size 4.83 cm    Left Atrium Minor Axis 7.46 cm    Left Atrium Major Axis 7.75 cm    LA Vol (MOD) 86.19 mL    HORTENCIA (MOD) 40.7 mL/m2    RA Major Axis 7.28 cm    AV mean gradient 19.7 mmHg    AV peak gradient 31.4 mmHg    Ao peak dariel 2.8 m/s    Ao VTI 76.8 cm    LVOT peak VTI 37.4 cm    AV valve area 1.5 cm²    AV Velocity Ratio 0.50     AV index (prosthetic) 0.49     JUAN by Velocity Ratio 1.6 cm²    Mr max dariel 4.82  m/s    TV mean gradient 19 mmHg    Triscuspid Valve Regurgitation Peak Gradient 30 mmHg    PV mean gradient 1 mmHg    PV PEAK VELOCITY 0.82 m/s    PV peak gradient 3 mmHg    Pulmonary Valve Mean Velocity 0.69 m/s    RVOT peak leo 0.65 m/s    STJ 2.64 cm    Ascending aorta 3.38 cm    IVC diameter 2.77 cm    Mean e' 0.11 m/s    ZLVIDS -1.69     ZLVIDD -2.34     LA area A4C 26.71 cm2    LA area A2C 28.45 cm2    RVDD 4.27 cm    HORTENCIA 55.9 mL/m2    LA Vol 118.60 cm3    LA WIDTH 3.8 cm    RA Width 4.8 cm    EF 65 %    TV resting pulmonary artery pressure 45 mmHg    RV TB RVSP 18 mmHg    Est. RA pres 15 mmHg    Narrative      Left Ventricle: The left ventricle is normal in size. Normal wall   thickness. There is concentric hypertrophy. Normal wall motion. There is   normal systolic function with a visually estimated ejection fraction of 60   - 65%. Ejection fraction is approximately 65%. There is normal diastolic   function.    Right Ventricle: Mild right ventricular enlargement. Systolic function   is normal.    Left Atrium: Left atrium is severely dilated.    Right Atrium: Right atrium is moderately dilated.    Aortic Valve: The aortic valve is a trileaflet valve. Mildly calcified   cusps. Mildly restricted motion. There is mild stenosis. Aortic valve area   by VTI is 1.5 cm². Aortic valve peak velocity is 2.8 m/s. Mean gradient is   19.7 mmHg. The dimensionless index is 0.49.    Mitral Valve: There is mild regurgitation.    Tricuspid Valve: There is moderate to severe regurgitation.    Pulmonary Artery: The estimated pulmonary artery systolic pressure is   45 mmHg.    IVC/SVC: Elevated venous pressure at 15 mmHg.    Pericardium: Ascites present.       Assessment and Plan:     * ESRD needing dialysis  Management per Nephrology    Elevated troponin  Demand ischemia   History of ESRD, missed 2 days of dialysis, currently volume overloaded  Echo with normal EF, moderate-to-severe TR- due to volume overload    Volume  overload  Due to missed days of dialysis   Will be having down today  Marker improvement of symptoms with dialysis    Hypertension  Titrate meds  Elevated        VTE Risk Mitigation (From admission, onward)           Ordered     heparin (porcine) injection 5,000 Units  Every 8 hours         10/12/24 2313     IP VTE HIGH RISK PATIENT  Once         10/12/24 2313     Place sequential compression device  Until discontinued         10/12/24 2313                    Thank you for your consult. I will sign off. Please contact us if you have any additional questions.    González Reddy MD  Cardiology   [facility] ([facility])

## 2024-10-13 NOTE — ASSESSMENT & PLAN NOTE
End-stage renal disease on HD TTS, uremia, hyperkalemia, hyperphosphatemia, metabolic acidosis, and with Goodpasture syndrome.  Patient has been noncompliant with dialysis x1 week.  BMP reviewed- noted Estimated Creatinine Clearance: 5.3 mL/min (A) (based on SCr of 17.4 mg/dL (H)). according to latest data. Based on current GFR, CKD stage is end stage.  Monitor UOP and serial BMP and adjust therapy as needed. Renally dose meds. Avoid nephrotoxic medications and procedures.  -patient presents with shortness of breath, volume overload, gross anasarca, drowsiness, uremic symptoms  -BUN 84, creatinine 16.8, CO2 19, potassium 5.4, magnesium level 2.4, phosphorus 9.1, calcium 8.8, BNP 2973  -ER physician did reach out to Nephrology who does not feel that patient requires urgent hemodialysis  -gave Lasix 80 mg IV x1 dose stat      -O2 supplementation, incentive spirometry, p.r.n. Steve  -continue Velphoro  -Lokelma 10 g p.o. TID  -continuous pulse oximetry and cardiac monitoring  -nephrology consulted, follow-up recs  -Trend renal function panel

## 2024-10-13 NOTE — ASSESSMENT & PLAN NOTE
Anemia is likely due to chronic disease due to ESRD. Most recent hemoglobin and hematocrit are listed below.  Recent Labs     10/12/24  2027 10/13/24  0256 10/13/24  0935   HGB 9.4* 9.3* 9.7*   HCT 27.5* 27.1* 29.1*       Plan  - Monitor serial CBC: Daily  - Transfuse PRBC if patient becomes hemodynamically unstable, symptomatic or H/H drops below 7/21.  - Patient has not received any PRBC transfusions to date  -Iron supplementation

## 2024-10-13 NOTE — ASSESSMENT & PLAN NOTE
Anemia is likely due to chronic disease due to ESRD. Most recent hemoglobin and hematocrit are listed below.  Recent Labs     10/12/24  2027   HGB 9.4*   HCT 27.5*     Plan  - Monitor serial CBC: Daily  - Transfuse PRBC if patient becomes hemodynamically unstable, symptomatic or H/H drops below 7/21.  - Patient has not received any PRBC transfusions to date  - Patient's anemia is currently  pending reassessment  - check iron studies, B12, folate, TSH, free T4, and INR

## 2024-10-13 NOTE — ASSESSMENT & PLAN NOTE
End-stage renal disease on HD TTS, uremia, hyperkalemia, hyperphosphatemia, metabolic acidosis, and with Goodpasture syndrome.  Patient has been noncompliant with dialysis x1 week.  BMP reviewed- noted Estimated Creatinine Clearance: 5.5 mL/min (A) (based on SCr of 16.8 mg/dL (H)). according to latest data. Based on current GFR, CKD stage is end stage.  Monitor UOP and serial BMP and adjust therapy as needed. Renally dose meds. Avoid nephrotoxic medications and procedures.  -patient presents with shortness of breath, volume overload, gross anasarca, drowsiness, uremic symptoms  -BUN 84, creatinine 16.8, CO2 19, potassium 5.4, magnesium level 2.4, phosphorus 9.1, calcium 8.8, BNP 2973  -ER physician did reach out to Nephrology who does not feel that patient requires urgent hemodialysis  -give Lasix 80 mg IV x1 dose stat  -O2 supplementation, incentive spirometry, p.r.n. Steve  -continue Velphoro  -give Lokelma 10 g p.o. daily, 1st dose now  -continuous pulse oximetry and cardiac monitoring  -check CBC, renal function panel, and LFTs in a.m.  -consult nephrology

## 2024-10-13 NOTE — ASSESSMENT & PLAN NOTE
Elevated troponin with abnormal EKG  -patient denies chest pain does have shortness of breath.  Dyspnea most likely related to volume overload with end-stage renal disease and noncompliance with dialysis  -troponin 0.095, BNP 2973  -chest x-ray negative  -EKG with sinus rhythm with first-degree AV block and questionable inferior/anterior infarct that is age undetermined  -TTE 10/13/24 with normal LV wall motion    -continue aspirin and Coreg  -telemetry monitoring  -cardiology consulted. Followup recs

## 2024-10-13 NOTE — PROGRESS NOTES
[facility] Medicine  Progress Note    Patient Name: David Guzman  MRN: [id number]  Patient Class: IP- Inpatient   Admission Date: 10/12/2024  Length of Stay: 1 days  Attending Physician: Eduardo Obrien DO  Primary Care Provider: Martha Chung MD        Subjective:     Principal Problem:ESRD needing dialysis        HPI:  59-year-old man with history of end-stage renal disease on hemodialysis TTS, Goodpasture syndrome, hypertension, BPH, gastroesophageal reflux disease, obesity, right upper extremity cellulitis, cocaine abuse, substance abuse, gastroesophageal reflux disease, and obesity who presents to the emergency department with complaint of shortness of breath and swelling everywhere over the last several days.  Symptoms are constant, moderate-to-severe in nature, and with no aggravating or alleviating factor identified.  Patient has missed dialysis over the last week as he has recently moved and has been having transportation issues.  Patient does admit that he is noncompliant with his home medications to include antihypertensives.  Patient denies any associated chest pain, cough, nausea, vomiting, fevers, chills, diarrhea, constipation.  Patient reports he does still produce a very small amount of urine on occasion.  Abdomen is significantly distended but without abdominal pain.  Patient also has a facial rash that is pruritic; he has been scratching it and has some/minimal active bleeding appreciated.  Abnormal labs include hemoglobin 9.4, hematocrit 27.5, platelets 101, potassium 5.4, CO2 19, BUN 84, creatinine 16.8, phosphorus 9.1, alk phos 203, BNP 2973, and troponin 0.095.  ER physician did reach out to Nephrology who does not feel that patient requires emergent hemodialysis.    Last hospital admit 2/1-02/02/2024 for right upper extremity cellulitis requiring IV vancomycin and discharged home with oral doxycycline.    Overview/Hospital Course:  Admitted to  Hospital Medicine for evaluation of fluid overload concerns in the setting of multiple missed dialysis sessions.  Nephrology consulted for inpatient dialysis.  Troponins remained flat, and patient denied any complaints of chest pains.  Started on Lokelma scheduled for hyperkalemia.  Nephrology with plans for dialysis.    Interval History: No acute events overnight, afebrile, hemodynamically stable.  Reports shortness of breath, lower extremity edema.   Nephrology consulted and following for inpatient dialysis concerns.  Discussed with Nephrology, plans for dialysis tomorrow morning.    Objective:     Vital Signs (Most Recent):  Temp: 97.6 °F (36.4 °C) (10/13/24 0841)  Pulse: (!) 58 (10/13/24 1111)  Resp: 16 (10/13/24 0841)  BP: (!) 151/86 (10/13/24 0841)  SpO2: 95 % (10/13/24 1111) Vital Signs (24h Range):  Temp:  [97.3 °F (36.3 °C)-97.6 °F (36.4 °C)] 97.6 °F (36.4 °C)  Pulse:  [58-67] 58  Resp:  [16-24] 16  SpO2:  [93 %-99 %] 95 %  BP: (146-197)/() 151/86     Weight: 101.3 kg (223 lb 5.2 oz)  Body mass index is 34.98 kg/m².  No intake or output data in the 24 hours ending 10/13/24 1722      Physical Exam  Vitals reviewed.   Constitutional:       General: He is not in acute distress.     Appearance: He is obese. He is ill-appearing. He is not diaphoretic.      Comments: Intermittently somnolent, but awakens and responds appropriately   HENT:      Mouth/Throat:      Mouth: Mucous membranes are moist.   Cardiovascular:      Rate and Rhythm: Normal rate and regular rhythm.   Pulmonary:      Effort: Respiratory distress (mild tachypnea) present.      Breath sounds: No wheezing.   Abdominal:      General: Bowel sounds are normal. There is distension.      Palpations: Abdomen is soft.      Tenderness: There is no abdominal tenderness. There is no guarding or rebound.   Musculoskeletal:         General: Swelling present.      Right lower leg: Edema present.      Left lower leg: Edema present.      Comments: Facial  rash with pruritus and minimal bleeding related to patient scratching   Skin:     General: Skin is warm and dry.   Neurological:      Mental Status: He is alert and oriented to person, place, and time.   Psychiatric:         Mood and Affect: Mood normal.         Behavior: Behavior normal.      Comments: Drowsy but arousable             Significant Labs: All pertinent labs within the past 24 hours have been reviewed.  LABS:  Recent Labs   Lab 10/12/24  2027 10/13/24  0256 10/13/24  0935    136 137   K 5.4* 5.6* 5.7*   CL 96 96 97   CO2 19* 18* 17*   BUN 84* 89* 92*   CREATININE 16.8* 16.9* 17.1*   GLU 89 87 84   ANIONGAP 22* 22* 23*     Recent Labs   Lab 10/12/24  2027 10/13/24  0256 10/13/24  0935   MG 2.4  --  2.4   PHOS 9.1* 9.5* 9.8*     Recent Labs   Lab 10/12/24  2027 10/13/24  0256 10/13/24  0935   AST 39 36 37   ALT 34 30 30   ALKPHOS 203* 187* 182*   BILITOT 0.9 0.9 0.9   BILIDIR  --  0.5*  --    ALBUMIN 3.6 3.5  3.5 3.5     POCT Glucose:   Recent Labs   Lab 10/13/24  1502   POCTGLUCOSE 112*    Recent Labs   Lab 10/12/24  2027 10/13/24  0256 10/13/24  0935   WBC 6.33 5.87 4.96   HGB 9.4* 9.3* 9.7*   HCT 27.5* 27.1* 29.1*   * 102* 104*   GRAN 62.0  3.9 59.8  3.5 62.7  3.1        Micro  Blood Cultures  Lab Results   Component Value Date    LABBLOO No growth after 5 days. 02/01/2024    LABBLOO No growth after 5 days. 02/01/2024       Significant Imaging: I have reviewed all pertinent imaging results/findings within the past 24 hours.  US Abdomen Limited   Final Result      1. The liver has a nodular surface and is heterogeneous in appearance.  This is characteristic of hepatic cirrhosis.   2. There is a small amount of ascites.   3. Cholelithiasis.  The common bile duct is dilated. It has a diameter of 7 mm.         Electronically signed by: Jose Wilkerson MD   Date:    10/13/2024   Time:    08:16      X-Ray Chest AP Portable   Final Result      No acute abnormality.         Electronically  signed by: Braulio Willams   Date:    10/12/2024   Time:    20:46          Inpatient Medications:    Scheduled Meds:   amLODIPine  10 mg Oral Daily    aspirin  81 mg Oral Daily    carvediloL  12.5 mg Oral BID    heparin (porcine)  5,000 Units Subcutaneous Q8H    hydrALAZINE  100 mg Oral TID    mupirocin   Topical (Top) BID    sodium zirconium cyclosilicate  10 g Oral TID    sucroferric oxyhydroxide  2 tablet Oral TID WM    tamsulosin  0.4 mg Oral Daily     PRN Meds:  Current Facility-Administered Medications:     acetaminophen, 650 mg, Oral, Q6H PRN    albuterol-ipratropium, 3 mL, Nebulization, Q6H PRN    [COMPLETED] calcium gluconate IVPB, 1 g, Intravenous, Once **AND** calcium gluconate IVPB, 1 g, Intravenous, Q10 Min PRN    dextrose 10%, 12.5 g, Intravenous, PRN    dextrose 10%, 25 g, Intravenous, PRN    [COMPLETED] dextrose 10%, 50 g, Intravenous, Once **AND** dextrose 10%, 25 g, Intravenous, PRN **AND** [COMPLETED] insulin regular, 10 Units, Intravenous, Once    glucagon (human recombinant), 1 mg, Intramuscular, PRN    glucose, 16 g, Oral, PRN    glucose, 24 g, Oral, PRN    hydrALAZINE, 10 mg, Intravenous, Q6H PRN    hydrOXYzine HCL, 25 mg, Oral, TID PRN    naloxone, 0.02 mg, Intravenous, PRN    ondansetron, 4 mg, Intravenous, Q6H PRN    sodium chloride 0.9%, 10 mL, Intravenous, Q12H PRN      Assessment/Plan:      * ESRD needing dialysis  End-stage renal disease on HD TTS, uremia, hyperkalemia, hyperphosphatemia, metabolic acidosis, and with Goodpasture syndrome.  Patient has been noncompliant with dialysis x1 week.  BMP reviewed- noted Estimated Creatinine Clearance: 5.3 mL/min (A) (based on SCr of 17.4 mg/dL (H)). according to latest data. Based on current GFR, CKD stage is end stage.  Monitor UOP and serial BMP and adjust therapy as needed. Renally dose meds. Avoid nephrotoxic medications and procedures.  -patient presents with shortness of breath, volume overload, gross anasarca, drowsiness, uremic  symptoms  -BUN 84, creatinine 16.8, CO2 19, potassium 5.4, magnesium level 2.4, phosphorus 9.1, calcium 8.8, BNP 2973  -ER physician did reach out to Nephrology who does not feel that patient requires urgent hemodialysis  -gave Lasix 80 mg IV x1 dose stat      -O2 supplementation, incentive spirometry, p.r.n. DuoNebs  -continue Velphoro  -Lokelma 10 g p.o. TID  -continuous pulse oximetry and cardiac monitoring  -nephrology consulted, follow-up recs  -Trend renal function panel    Obesity (BMI 30.0-34.9)  BMI 34.46.  Patient would benefit from diet and lifestyle modifications for weight loss.      Elevated alkaline phosphatase level  -patient does have abdominal distention most likely related to gross anasarca, no abdominal tenderness to palpation  -Abdominal U/S 10/13 noted:   1. The liver has a nodular surface and is heterogeneous in appearance.  This is characteristic of hepatic cirrhosis.   2. There is a small amount of ascites.   3. Cholelithiasis.  The common bile duct is dilated. It has a diameter of 7 mm.     -alk phos 203 with remaining LFTs unremarkable  -Trend on CMP    Abnormal EKG  See discussion for elevated troponin      Elevated troponin  Elevated troponin with abnormal EKG  -patient denies chest pain does have shortness of breath.  Dyspnea most likely related to volume overload with end-stage renal disease and noncompliance with dialysis  -troponin 0.095, BNP 2973  -chest x-ray negative  -EKG with sinus rhythm with first-degree AV block and questionable inferior/anterior infarct that is age undetermined  -TTE 10/13/24 with normal LV wall motion    -continue aspirin and Coreg  -telemetry monitoring  -cardiology consulted. Followup recs      Thrombocytopenia  The likely etiology of thrombocytopenia is  ESRD . The patients 3 most recent labs are listed below.  Recent Labs     10/12/24  2027 10/13/24  0256 10/13/24  0935   * 102* 104*       Plan  - Will transfuse if platelet count is <50k (if  undergoing surgical procedure or have active bleeding).  - platelet count was 124 on 02/01/2024 and prior to that labs were 8 years ago with normal platelets  -check iron studies, thyroid studies, B12, folate, INR, HIV, acute hepatitis panel, lactic acid level, LDH, and haptoglobin  -Trend on CBC      Normocytic anemia  Anemia is likely due to chronic disease due to ESRD. Most recent hemoglobin and hematocrit are listed below.  Recent Labs     10/12/24  2027 10/13/24  0256 10/13/24  0935   HGB 9.4* 9.3* 9.7*   HCT 27.5* 27.1* 29.1*       Plan  - Monitor serial CBC: Daily  - Transfuse PRBC if patient becomes hemodynamically unstable, symptomatic or H/H drops below 7/21.  - Patient has not received any PRBC transfusions to date  -Iron supplementation    H/O: substance abuse  Patient denies any illicit drug use.     -Check UDS and alcohol level.      Facial rash  -likely uremic pruritus and/or calcinosis cutis in the setting of end-stage renal disease  -calciphylaxis unlikely given appearance of facial rash    -we will trial application of moisturizer and application of mupirocin ointment to open lesions  -wound care consult given open lesions  -p.r.n. Atarax      Volume overload  Volume overload with gross anasarca related to noncompliance with dialysis  -chest x-ray negative  -BNP 2973  -previous echocardiogram 07/11/2023 with EF greater than 65%  -Results for orders placed during the hospital encounter of 10/12/24  Echo  Interpretation Summary    Left Ventricle: The left ventricle is normal in size. Normal wall thickness. There is concentric hypertrophy. Normal wall motion. There is normal systolic function with a visually estimated ejection fraction of 60 - 65%. Ejection fraction is approximately 65%. There is normal diastolic function.    Right Ventricle: Mild right ventricular enlargement. Systolic function is normal.    Left Atrium: Left atrium is severely dilated.    Right Atrium: Right atrium is moderately  dilated.    Aortic Valve: The aortic valve is a trileaflet valve. Mildly calcified cusps. Mildly restricted motion. There is mild stenosis. Aortic valve area by VTI is 1.5 cm². Aortic valve peak velocity is 2.8 m/s. Mean gradient is 19.7 mmHg. The dimensionless index is 0.49.    Mitral Valve: There is mild regurgitation.    Tricuspid Valve: There is moderate to severe regurgitation.    Pulmonary Artery: The estimated pulmonary artery systolic pressure is 45 mmHg.    IVC/SVC: Elevated venous pressure at 15 mmHg.    Pericardium: Ascites present.      PLAN:  -nephrology has been consulted for dialysis        Uremia  See discussion for end-stage renal disease needing dialysis      Hypertension  Patients blood pressure range in the last 24 hours was: BP  Min: 146/92  Max: 197/95.The patient's inpatient anti-hypertensive regimen is listed below:  Current Antihypertensives  amLODIPine tablet 10 mg, Daily, Oral  carvediloL tablet 12.5 mg, 2 times daily, Oral  hydrALAZINE injection 10 mg, Every 6 hours PRN, Intravenous  hydrALAZINE tablet 100 mg, 3 times daily, Oral    Plan  - BP is uncontrolled, will adjust as follows:  Continue home medications as patient reports he has been noncompliant with these, 1st dose of each given while in the emergency department  - add p.r.n. IV hydralazine with parameters      VTE Risk Mitigation (From admission, onward)           Ordered     heparin (porcine) injection 5,000 Units  Every 8 hours         10/12/24 2313     IP VTE HIGH RISK PATIENT  Once         10/12/24 2313     Place sequential compression device  Until discontinued         10/12/24 2313                    Discharge Planning   CARYN:      Code Status: Full Code   Is the patient medically ready for discharge?:     Reason for patient still in hospital (select all that apply): Patient trending condition, Laboratory test, Treatment, and Consult recommendations                     Eduardo Obrien DO  Department of Hospital Medicine    [facility] ([facility])    Voice recognition software was used in the creation of this note/communication and any sound-alike/typographical errors which may have occurred despite initial review prior to signing should be taken in context when interpreting.  If such errors prevent a clear understanding of the note/communication, please contact the provider/office for clarification.

## 2024-10-13 NOTE — ASSESSMENT & PLAN NOTE
The likely etiology of thrombocytopenia is  ESRD . The patients 3 most recent labs are listed below.  Recent Labs     10/12/24  2027 10/13/24  0256 10/13/24  0935   * 102* 104*       Plan  - Will transfuse if platelet count is <50k (if undergoing surgical procedure or have active bleeding).  - platelet count was 124 on 02/01/2024 and prior to that labs were 8 years ago with normal platelets  -check iron studies, thyroid studies, B12, folate, INR, HIV, acute hepatitis panel, lactic acid level, LDH, and haptoglobin  -Trend on CBC

## 2024-10-13 NOTE — ASSESSMENT & PLAN NOTE
-patient does have abdominal distention most likely related to gross anasarca, no abdominal tenderness to palpation  -alk phos 203 with remaining LFTs unremarkable  -check GGT and INR  -check right upper quadrant abdominal ultrasound  -check a.m. labs

## 2024-10-13 NOTE — ASSESSMENT & PLAN NOTE
Due to missed days of dialysis   Will be having down today  Marker improvement of symptoms with dialysis

## 2024-10-13 NOTE — ASSESSMENT & PLAN NOTE
Patients blood pressure range in the last 24 hours was: BP  Min: 146/92  Max: 197/95.The patient's inpatient anti-hypertensive regimen is listed below:  Current Antihypertensives  amLODIPine tablet 10 mg, Daily, Oral  carvediloL tablet 12.5 mg, 2 times daily, Oral  hydrALAZINE injection 10 mg, Every 6 hours PRN, Intravenous  hydrALAZINE tablet 100 mg, 3 times daily, Oral    Plan  - BP is uncontrolled, will adjust as follows:  Continue home medications as patient reports he has been noncompliant with these, 1st dose of each given while in the emergency department  - add p.r.n. IV hydralazine with parameters

## 2024-10-13 NOTE — ASSESSMENT & PLAN NOTE
The likely etiology of thrombocytopenia is  unclear . The patients 3 most recent labs are listed below.  Recent Labs     10/12/24  2027   *     Plan  - Will transfuse if platelet count is <50k (if undergoing surgical procedure or have active bleeding).  - platelet count was 124 on 02/01/2024 and prior to that labs were 8 years ago with normal platelets  -given concomitant anemia, we will order additional evaluation  -check iron studies, thyroid studies, B12, folate, INR, HIV, acute hepatitis panel, lactic acid level, LDH, and haptoglobin  -check CBC in a.m.

## 2024-10-14 PROBLEM — J96.01 ACUTE HYPOXEMIC RESPIRATORY FAILURE: Status: ACTIVE | Noted: 2024-10-14

## 2024-10-14 PROBLEM — I07.1 TRICUSPID REGURGITATION: Status: ACTIVE | Noted: 2024-10-14

## 2024-10-14 PROBLEM — R76.8 HEPATITIS C ANTIBODY POSITIVE IN BLOOD: Status: ACTIVE | Noted: 2024-10-14

## 2024-10-14 LAB
ALBUMIN SERPL BCP-MCNC: 3.5 G/DL (ref 3.5–5.2)
ALP SERPL-CCNC: 176 U/L (ref 55–135)
ALT SERPL W/O P-5'-P-CCNC: 30 U/L (ref 10–44)
ANION GAP SERPL CALC-SCNC: 18 MMOL/L (ref 8–16)
ANION GAP SERPL CALC-SCNC: 19 MMOL/L (ref 8–16)
ANION GAP SERPL CALC-SCNC: 24 MMOL/L (ref 8–16)
ANION GAP SERPL CALC-SCNC: 24 MMOL/L (ref 8–16)
AST SERPL-CCNC: 38 U/L (ref 10–40)
BASOPHILS # BLD AUTO: 0.03 K/UL (ref 0–0.2)
BASOPHILS NFR BLD: 0.6 % (ref 0–1.9)
BILIRUB DIRECT SERPL-MCNC: 0.4 MG/DL (ref 0.1–0.3)
BILIRUB SERPL-MCNC: 0.9 MG/DL (ref 0.1–1)
BUN SERPL-MCNC: 47 MG/DL (ref 6–20)
BUN SERPL-MCNC: 49 MG/DL (ref 6–20)
BUN SERPL-MCNC: 90 MG/DL (ref 6–20)
BUN SERPL-MCNC: 96 MG/DL (ref 6–20)
CALCIUM SERPL-MCNC: 8.8 MG/DL (ref 8.7–10.5)
CALCIUM SERPL-MCNC: 8.8 MG/DL (ref 8.7–10.5)
CALCIUM SERPL-MCNC: 9 MG/DL (ref 8.7–10.5)
CALCIUM SERPL-MCNC: 9 MG/DL (ref 8.7–10.5)
CHLORIDE SERPL-SCNC: 92 MMOL/L (ref 95–110)
CHLORIDE SERPL-SCNC: 94 MMOL/L (ref 95–110)
CHLORIDE SERPL-SCNC: 95 MMOL/L (ref 95–110)
CHLORIDE SERPL-SCNC: 95 MMOL/L (ref 95–110)
CO2 SERPL-SCNC: 17 MMOL/L (ref 23–29)
CO2 SERPL-SCNC: 18 MMOL/L (ref 23–29)
CO2 SERPL-SCNC: 22 MMOL/L (ref 23–29)
CO2 SERPL-SCNC: 24 MMOL/L (ref 23–29)
CREAT SERPL-MCNC: 11.4 MG/DL (ref 0.5–1.4)
CREAT SERPL-MCNC: 12.2 MG/DL (ref 0.5–1.4)
CREAT SERPL-MCNC: 17.4 MG/DL (ref 0.5–1.4)
CREAT SERPL-MCNC: 17.6 MG/DL (ref 0.5–1.4)
DIFFERENTIAL METHOD BLD: ABNORMAL
EOSINOPHIL # BLD AUTO: 0.4 K/UL (ref 0–0.5)
EOSINOPHIL NFR BLD: 7.3 % (ref 0–8)
ERYTHROCYTE [DISTWIDTH] IN BLOOD BY AUTOMATED COUNT: 17.6 % (ref 11.5–14.5)
EST. GFR  (NO RACE VARIABLE): 3 ML/MIN/1.73 M^2
EST. GFR  (NO RACE VARIABLE): 3 ML/MIN/1.73 M^2
EST. GFR  (NO RACE VARIABLE): 4 ML/MIN/1.73 M^2
EST. GFR  (NO RACE VARIABLE): 5 ML/MIN/1.73 M^2
GLUCOSE SERPL-MCNC: 105 MG/DL (ref 70–110)
GLUCOSE SERPL-MCNC: 109 MG/DL (ref 70–110)
GLUCOSE SERPL-MCNC: 111 MG/DL (ref 70–110)
GLUCOSE SERPL-MCNC: 81 MG/DL (ref 70–110)
HCT VFR BLD AUTO: 28.9 % (ref 40–54)
HGB BLD-MCNC: 9.6 G/DL (ref 14–18)
IMM GRANULOCYTES # BLD AUTO: 0.05 K/UL (ref 0–0.04)
IMM GRANULOCYTES NFR BLD AUTO: 1 % (ref 0–0.5)
LYMPHOCYTES # BLD AUTO: 0.6 K/UL (ref 1–4.8)
LYMPHOCYTES NFR BLD: 11.2 % (ref 18–48)
MAGNESIUM SERPL-MCNC: 2.5 MG/DL (ref 1.6–2.6)
MCH RBC QN AUTO: 32.2 PG (ref 27–31)
MCHC RBC AUTO-ENTMCNC: 33.2 G/DL (ref 32–36)
MCV RBC AUTO: 97 FL (ref 82–98)
MONOCYTES # BLD AUTO: 0.9 K/UL (ref 0.3–1)
MONOCYTES NFR BLD: 18.5 % (ref 4–15)
NEUTROPHILS # BLD AUTO: 3.1 K/UL (ref 1.8–7.7)
NEUTROPHILS NFR BLD: 61.4 % (ref 38–73)
NRBC BLD-RTO: 0 /100 WBC
OHS QRS DURATION: 78 MS
OHS QTC CALCULATION: 474 MS
PHOSPHATE SERPL-MCNC: 10.2 MG/DL (ref 2.7–4.5)
PLATELET # BLD AUTO: 113 K/UL (ref 150–450)
PMV BLD AUTO: 11.7 FL (ref 9.2–12.9)
POCT GLUCOSE: 106 MG/DL (ref 70–110)
POCT GLUCOSE: 95 MG/DL (ref 70–110)
POCT GLUCOSE: 99 MG/DL (ref 70–110)
POTASSIUM SERPL-SCNC: 3.9 MMOL/L (ref 3.5–5.1)
POTASSIUM SERPL-SCNC: 4 MMOL/L (ref 3.5–5.1)
POTASSIUM SERPL-SCNC: 5.3 MMOL/L (ref 3.5–5.1)
POTASSIUM SERPL-SCNC: 5.3 MMOL/L (ref 3.5–5.1)
PROT SERPL-MCNC: 7.4 G/DL (ref 6–8.4)
RBC # BLD AUTO: 2.98 M/UL (ref 4.6–6.2)
SODIUM SERPL-SCNC: 134 MMOL/L (ref 136–145)
SODIUM SERPL-SCNC: 136 MMOL/L (ref 136–145)
TROPONIN I SERPL DL<=0.01 NG/ML-MCNC: 0.07 NG/ML (ref 0–0.03)
WBC # BLD AUTO: 5.09 K/UL (ref 3.9–12.7)

## 2024-10-14 PROCEDURE — 63600175 PHARM REV CODE 636 W HCPCS: Performed by: INTERNAL MEDICINE

## 2024-10-14 PROCEDURE — 25000003 PHARM REV CODE 250: Performed by: INTERNAL MEDICINE

## 2024-10-14 PROCEDURE — 90935 HEMODIALYSIS ONE EVALUATION: CPT | Mod: ,,, | Performed by: INTERNAL MEDICINE

## 2024-10-14 PROCEDURE — 99900035 HC TECH TIME PER 15 MIN (STAT)

## 2024-10-14 PROCEDURE — 83735 ASSAY OF MAGNESIUM: CPT | Performed by: STUDENT IN AN ORGANIZED HEALTH CARE EDUCATION/TRAINING PROGRAM

## 2024-10-14 PROCEDURE — 80100016 HC MAINTENANCE HEMODIALYSIS

## 2024-10-14 PROCEDURE — 85025 COMPLETE CBC W/AUTO DIFF WBC: CPT | Performed by: STUDENT IN AN ORGANIZED HEALTH CARE EDUCATION/TRAINING PROGRAM

## 2024-10-14 PROCEDURE — 36415 COLL VENOUS BLD VENIPUNCTURE: CPT | Performed by: STUDENT IN AN ORGANIZED HEALTH CARE EDUCATION/TRAINING PROGRAM

## 2024-10-14 PROCEDURE — 27000221 HC OXYGEN, UP TO 24 HOURS

## 2024-10-14 PROCEDURE — 80076 HEPATIC FUNCTION PANEL: CPT | Performed by: STUDENT IN AN ORGANIZED HEALTH CARE EDUCATION/TRAINING PROGRAM

## 2024-10-14 PROCEDURE — 25000003 PHARM REV CODE 250: Performed by: STUDENT IN AN ORGANIZED HEALTH CARE EDUCATION/TRAINING PROGRAM

## 2024-10-14 PROCEDURE — 80048 BASIC METABOLIC PNL TOTAL CA: CPT | Performed by: STUDENT IN AN ORGANIZED HEALTH CARE EDUCATION/TRAINING PROGRAM

## 2024-10-14 PROCEDURE — 21400001 HC TELEMETRY ROOM

## 2024-10-14 PROCEDURE — 84100 ASSAY OF PHOSPHORUS: CPT | Performed by: STUDENT IN AN ORGANIZED HEALTH CARE EDUCATION/TRAINING PROGRAM

## 2024-10-14 PROCEDURE — 94761 N-INVAS EAR/PLS OXIMETRY MLT: CPT

## 2024-10-14 PROCEDURE — 84484 ASSAY OF TROPONIN QUANT: CPT | Performed by: STUDENT IN AN ORGANIZED HEALTH CARE EDUCATION/TRAINING PROGRAM

## 2024-10-14 RX ORDER — SODIUM CHLORIDE 9 MG/ML
INJECTION, SOLUTION INTRAVENOUS ONCE
Status: CANCELLED | OUTPATIENT
Start: 2024-10-14 | End: 2024-10-14

## 2024-10-14 RX ORDER — SODIUM CHLORIDE 9 MG/ML
INJECTION, SOLUTION INTRAVENOUS
Status: CANCELLED | OUTPATIENT
Start: 2024-10-14

## 2024-10-14 RX ADMIN — HYDROXYZINE HYDROCHLORIDE 25 MG: 25 TABLET ORAL at 11:10

## 2024-10-14 RX ADMIN — HEPARIN SODIUM 5000 UNITS: 5000 INJECTION INTRAVENOUS; SUBCUTANEOUS at 05:10

## 2024-10-14 RX ADMIN — HYDRALAZINE HYDROCHLORIDE 100 MG: 50 TABLET ORAL at 01:10

## 2024-10-14 RX ADMIN — TAMSULOSIN HYDROCHLORIDE 0.4 MG: 0.4 CAPSULE ORAL at 01:10

## 2024-10-14 RX ADMIN — MUPIROCIN: 20 OINTMENT TOPICAL at 08:10

## 2024-10-14 RX ADMIN — HEPARIN SODIUM 5000 UNITS: 5000 INJECTION INTRAVENOUS; SUBCUTANEOUS at 09:10

## 2024-10-14 RX ADMIN — SUCROFERRIC OXYHYDROXIDE 1000 MG: 500 TABLET, CHEWABLE ORAL at 01:10

## 2024-10-14 RX ADMIN — CARVEDILOL 12.5 MG: 12.5 TABLET, FILM COATED ORAL at 08:10

## 2024-10-14 RX ADMIN — HYDRALAZINE HYDROCHLORIDE 100 MG: 50 TABLET ORAL at 05:10

## 2024-10-14 RX ADMIN — HEPARIN SODIUM 5000 UNITS: 5000 INJECTION INTRAVENOUS; SUBCUTANEOUS at 02:10

## 2024-10-14 RX ADMIN — SUCROFERRIC OXYHYDROXIDE 1000 MG: 500 TABLET, CHEWABLE ORAL at 05:10

## 2024-10-14 RX ADMIN — ASPIRIN 81 MG: 81 TABLET, COATED ORAL at 01:10

## 2024-10-14 RX ADMIN — HYDRALAZINE HYDROCHLORIDE 10 MG: 20 INJECTION, SOLUTION INTRAMUSCULAR; INTRAVENOUS at 05:10

## 2024-10-14 RX ADMIN — AMLODIPINE BESYLATE 10 MG: 10 TABLET ORAL at 01:10

## 2024-10-14 RX ADMIN — HYDRALAZINE HYDROCHLORIDE 100 MG: 50 TABLET ORAL at 08:10

## 2024-10-14 RX ADMIN — FERROUS SULFATE TAB 325 MG (65 MG ELEMENTAL FE) 1 EACH: 325 (65 FE) TAB at 01:10

## 2024-10-14 RX ADMIN — CARVEDILOL 12.5 MG: 12.5 TABLET, FILM COATED ORAL at 01:10

## 2024-10-14 RX ADMIN — SODIUM ZIRCONIUM CYCLOSILICATE 10 G: 5 POWDER, FOR SUSPENSION ORAL at 01:10

## 2024-10-14 RX ADMIN — MUPIROCIN: 20 OINTMENT TOPICAL at 01:10

## 2024-10-14 RX ADMIN — HYDROXYZINE HYDROCHLORIDE 25 MG: 25 TABLET ORAL at 04:10

## 2024-10-14 NOTE — PROGRESS NOTES
[facility] Medicine  Progress Note    Patient Name: David Guzman  MRN: [id number]  Patient Class: IP- Inpatient   Admission Date: 10/12/2024  Length of Stay: 2 days  Attending Physician: Eduardo Obrien DO  Primary Care Provider: Martha Chung MD        Subjective:     Principal Problem:ESRD needing dialysis        HPI:  59-year-old man with history of end-stage renal disease on hemodialysis TTS, Goodpasture syndrome, hypertension, BPH, gastroesophageal reflux disease, obesity, right upper extremity cellulitis, cocaine abuse, substance abuse, gastroesophageal reflux disease, and obesity who presents to the emergency department with complaint of shortness of breath and swelling everywhere over the last several days.  Symptoms are constant, moderate-to-severe in nature, and with no aggravating or alleviating factor identified.  Patient has missed dialysis over the last week as he has recently moved and has been having transportation issues.  Patient does admit that he is noncompliant with his home medications to include antihypertensives.  Patient denies any associated chest pain, cough, nausea, vomiting, fevers, chills, diarrhea, constipation.  Patient reports he does still produce a very small amount of urine on occasion.  Abdomen is significantly distended but without abdominal pain.  Patient also has a facial rash that is pruritic; he has been scratching it and has some/minimal active bleeding appreciated.  Abnormal labs include hemoglobin 9.4, hematocrit 27.5, platelets 101, potassium 5.4, CO2 19, BUN 84, creatinine 16.8, phosphorus 9.1, alk phos 203, BNP 2973, and troponin 0.095.  ER physician did reach out to Nephrology who does not feel that patient requires emergent hemodialysis.    Last hospital admit 2/1-02/02/2024 for right upper extremity cellulitis requiring IV vancomycin and discharged home with oral doxycycline.    Overview/Hospital Course:  Admitted to  Hospital Medicine for evaluation of fluid overload concerns in the setting of multiple missed dialysis sessions.  Nephrology consulted for inpatient dialysis.  Troponins remained flat, and patient denied any complaints of chest pains.  Started on Lokelma scheduled for hyperkalemia.  Nephrology with plans for dialysis.Hep C ab reactive, RNA studies pending. Pt denies know h/o Hep C . Echo resulted with EF 60-65%, RA mod dilated, LA severly dialted, mild AS with JOSE 1.5cm2, mod- severe TR PAP 45 mmHG , ascities present.     Interval History: Pt with C/O SOB stable since yesterday. C/o abdominal distension , pt is not wearing O2 right now, he says he does not need it right now. Dialysis session planned for today.     Review of Systems   Constitutional:  Positive for activity change. Negative for appetite change and fever.   HENT:  Positive for facial swelling. Negative for postnasal drip.    Eyes:  Negative for pain, discharge and itching.   Respiratory:  Positive for shortness of breath. Negative for apnea, cough and chest tightness.    Cardiovascular:  Positive for leg swelling.   Gastrointestinal:  Positive for abdominal distention. Negative for abdominal pain, nausea and vomiting.   Endocrine: Negative for cold intolerance.   Genitourinary:  Negative for difficulty urinating.   Musculoskeletal:  Positive for back pain.   Skin:  Positive for rash and wound.   Neurological:  Negative for dizziness.   Hematological:  Negative for adenopathy.   Psychiatric/Behavioral:  Negative for agitation and behavioral problems.      Objective:     Vital Signs (Most Recent):  Temp: 98.2 °F (36.8 °C) (10/14/24 0742)  Pulse: 62 (10/14/24 0905)  Resp: 20 (10/14/24 0742)  BP: (!) 158/88 (10/14/24 0742)  SpO2: (!) 93 % (10/14/24 0742) Vital Signs (24h Range):  Temp:  [97.6 °F (36.4 °C)-98.2 °F (36.8 °C)] 98.2 °F (36.8 °C)  Pulse:  [54-73] 62  Resp:  [16-20] 20  SpO2:  [93 %-97 %] 93 %  BP: (130-165)/(70-95) 158/88     Weight: 101.3 kg  (223 lb 5.2 oz)  Body mass index is 34.98 kg/m².  No intake or output data in the 24 hours ending 10/14/24 0957      Physical Exam  Constitutional:       General: He is not in acute distress.     Appearance: He is ill-appearing and toxic-appearing. He is not diaphoretic.   HENT:      Head: Normocephalic and atraumatic.      Comments: Periorbital edema      Nose: No congestion or rhinorrhea.      Mouth/Throat:      Mouth: Mucous membranes are dry.   Eyes:      Extraocular Movements: Extraocular movements intact.   Cardiovascular:      Rate and Rhythm: Normal rate and regular rhythm.      Heart sounds: Murmur heard.   Pulmonary:      Breath sounds: No stridor. Rhonchi present. No wheezing.   Abdominal:      General: Bowel sounds are normal. There is distension.      Tenderness: There is no abdominal tenderness.   Genitourinary:     Comments: Deferred   Musculoskeletal:         General: Swelling present. No tenderness or signs of injury.      Right lower leg: Edema (BLE edema to knee , 1+ pitting at ankles) present.      Left lower leg: Edema present.   Skin:     General: Skin is warm and dry.      Findings: Lesion (facial and abdomen) present.   Neurological:      Mental Status: He is alert.      Motor: Weakness present.   Psychiatric:         Mood and Affect: Mood normal.         Behavior: Behavior normal.         Thought Content: Thought content normal.             Significant Labs: All pertinent labs within the past 24 hours have been reviewed.  Recent Lab Results  (Last 5 results in the past 24 hours)        10/14/24  0535   10/14/24  0533   10/13/24  2359   10/13/24  2032   10/13/24  1835        Albumin 3.5                              ALT 30               Anion Gap 24     24           AST 38               Baso # 0.03               Basophil % 0.6               Bilirubin Direct 0.4               BILIRUBIN TOTAL 0.9  Comment: For infants and newborns, interpretation of results should be based  on gestational  age, weight and in agreement with clinical  observations.    Premature Infant recommended reference ranges:  Up to 24 hours.............<8.0 mg/dL  Up to 48 hours............<12.0 mg/dL  3-5 days..................<15.0 mg/dL  6-29 days.................<15.0 mg/dL                 BUN 96     90           Calcium 9.0     8.8           Chloride 95     94           CO2 17     18           Creatinine 17.4     17.6           Differential Method Automated               eGFR 3     3           Eos # 0.4               Eos % 7.3               Glucose 81     105           Gran # (ANC) 3.1               Gran % 61.4               Hematocrit 28.9               Hemoglobin 9.6               Immature Grans (Abs) 0.05  Comment: Mild elevation in immature granulocytes is non specific and   can be seen in a variety of conditions including stress response,   acute inflammation, trauma and pregnancy. Correlation with other   laboratory and clinical findings is essential.                 Immature Granulocytes 1.0               Lymph # 0.6               Lymph % 11.2               Magnesium  2.5               MCH 32.2               MCHC 33.2               MCV 97               Mono # 0.9               Mono % 18.5               MPV 11.7               nRBC 0               Phosphorus Level 10.2  Comment: phosphorus  critical result(s) called and verbal readback obtained   from Silvia Servin rn  by MARY 10/14/2024 07:12                 Platelet Count 113               POCT Glucose   99     99   122       Potassium 5.3     5.3           PROTEIN TOTAL 7.4               RBC 2.98               RDW 17.6               Sodium 136     136           Troponin I 0.070  Comment: The reference interval for Troponin I represents the 99th percentile   cutoff   for our facility and is consistent with 3rd generation assay   performance.                 WBC 5.09                                      Significant Imaging: I have reviewed all pertinent imaging  results/findings within the past 24 hours.  Imaging Results              X-Ray Chest AP Portable (Final result)  Result time 10/12/24 20:46:37      Final result by Braulio Willams MD (10/12/24 20:46:37)                   Impression:      No acute abnormality.      Electronically signed by: Braulio Willams  Date:    10/12/2024  Time:    20:46               Narrative:    EXAMINATION:  XR CHEST AP PORTABLE    CLINICAL HISTORY:  sob;    TECHNIQUE:  Single frontal view of the chest was performed.    COMPARISON:  None    FINDINGS:  The lungs are clear, with normal appearance of pulmonary vasculature and no pleural effusion or pneumothorax.    Cardiomegaly.  The hilar and mediastinal contours are unremarkable.    Bones are intact.                                        Assessment/Plan:      * ESRD needing dialysis  End-stage renal disease on HD TTS, uremia, hyperkalemia, hyperphosphatemia, metabolic acidosis, and with Goodpasture syndrome.  Patient has been noncompliant with dialysis x1 week.  BMP reviewed- noted Estimated Creatinine Clearance: 5.3 mL/min (A) (based on SCr of 17.4 mg/dL (H)). according to latest data. Based on current GFR, CKD stage is end stage.  Monitor UOP and serial BMP and adjust therapy as needed. Renally dose meds. Avoid nephrotoxic medications and procedures.  -patient presents with shortness of breath, volume overload, gross anasarca, drowsiness, uremic symptoms  -BUN 84, creatinine 16.8, CO2 19, potassium 5.4, magnesium level 2.4, phosphorus 9.1, calcium 8.8, BNP 2973  -ER physician did reach out to Nephrology who does not feel that patient requires urgent hemodialysis  -gave Lasix 80 mg IV x1 dose stat in ED        -O2 supplementation, incentive spirometry, p.r.n. DuoNebs  -continue Velphoro  -Lokelma 10 g p.o. TID  -continuous pulse oximetry and cardiac monitoring  -nephrology consulted, follow-up recs  -Trend renal function panel    Hepatitis C antibody positive in blood  Reactive hep C ab , pt  denies prior hx of HCV  - Hep C RNA pending   - follow up pending RNA studies       Tricuspid regurgitation  Echocardiogram with evidence of tricuspid regurgitation that is moderate - severe . The patient's most recent echocardiogram result is listed below. We will manage the valvular abnormality by outpatient f/u with cards . Resume dialysis     Echo    Result Date: 10/13/2024    Left Ventricle: The left ventricle is normal in size. Normal wall   thickness. There is concentric hypertrophy. Normal wall motion. There is   normal systolic function with a visually estimated ejection fraction of 60   - 65%. Ejection fraction is approximately 65%. There is normal diastolic   function.    Right Ventricle: Mild right ventricular enlargement. Systolic function   is normal.    Left Atrium: Left atrium is severely dilated.    Right Atrium: Right atrium is moderately dilated.    Aortic Valve: The aortic valve is a trileaflet valve. Mildly calcified   cusps. Mildly restricted motion. There is mild stenosis. Aortic valve area   by VTI is 1.5 cm². Aortic valve peak velocity is 2.8 m/s. Mean gradient is   19.7 mmHg. The dimensionless index is 0.49.    Mitral Valve: There is mild regurgitation.    Tricuspid Valve: There is moderate to severe regurgitation.    Pulmonary Artery: The estimated pulmonary artery systolic pressure is   45 mmHg.    IVC/SVC: Elevated venous pressure at 15 mmHg.    Pericardium: Ascites present.      Pt will need outpatient follow up with cards, order placed    Obesity (BMI 30.0-34.9)  BMI 34.46.  Patient would benefit from diet and lifestyle modifications for weight loss.      Elevated alkaline phosphatase level  -patient does have abdominal distention most likely related to gross anasarca, no abdominal tenderness to palpation  -Abdominal U/S 10/13 noted:   1. The liver has a nodular surface and is heterogeneous in appearance.  This is characteristic of hepatic cirrhosis.   2. There is a small amount of  ascites.   3. Cholelithiasis.  The common bile duct is dilated. It has a diameter of 7 mm.     -alk phos 203 with remaining LFTs unremarkable  -Trend on CMP    Abnormal EKG  See discussion for elevated troponin      Elevated troponin  Elevated troponin with abnormal EKG  -patient denies chest pain does have shortness of breath.  Dyspnea most likely related to volume overload with end-stage renal disease and noncompliance with dialysis  -troponin 0.095, BNP 2973  -chest x-ray negative  -EKG with sinus rhythm with first-degree AV block and questionable inferior/anterior infarct that is age undetermined  -TTE 10/13/24 with normal LV wall motion    -continue aspirin and Coreg  -telemetry monitoring  -cardiology consulted. Followup recs      Thrombocytopenia  The likely etiology of thrombocytopenia is  ESRD . The patients 3 most recent labs are listed below.  Recent Labs     10/12/24  2027 10/13/24  0256 10/13/24  0935   * 102* 104*       Plan  - Will transfuse if platelet count is <50k (if undergoing surgical procedure or have active bleeding).  - platelet count was 124 on 02/01/2024 and prior to that labs were 8 years ago with normal platelets  -check iron studies, thyroid studies, B12, folate, INR, HIV, acute hepatitis panel, lactic acid level, LDH, and haptoglobin  -Trend on CBC      Normocytic anemia  Anemia is likely due to chronic disease due to ESRD. Most recent hemoglobin and hematocrit are listed below.  Recent Labs     10/12/24  2027 10/13/24  0256 10/13/24  0935   HGB 9.4* 9.3* 9.7*   HCT 27.5* 27.1* 29.1*       Plan  - Monitor serial CBC: Daily  - Transfuse PRBC if patient becomes hemodynamically unstable, symptomatic or H/H drops below 7/21.  - Patient has not received any PRBC transfusions to date  -Iron supplementation    H/O: substance abuse  Patient denies any illicit drug use.     -Check UDS and alcohol level.      Facial rash  -likely uremic pruritus and/or calcinosis cutis in the setting of  end-stage renal disease  -calciphylaxis unlikely given appearance of facial rash    -we will trial application of moisturizer and application of mupirocin ointment to open lesions  -wound care consult given open lesions  -p.r.n. Atarax      Volume overload  Volume overload with gross anasarca related to noncompliance with dialysis  -chest x-ray negative  -BNP 2973  -previous echocardiogram 07/11/2023 with EF greater than 65%  -Results for orders placed during the hospital encounter of 10/12/24  Echo  Interpretation Summary    Left Ventricle: The left ventricle is normal in size. Normal wall thickness. There is concentric hypertrophy. Normal wall motion. There is normal systolic function with a visually estimated ejection fraction of 60 - 65%. Ejection fraction is approximately 65%. There is normal diastolic function.    Right Ventricle: Mild right ventricular enlargement. Systolic function is normal.    Left Atrium: Left atrium is severely dilated.    Right Atrium: Right atrium is moderately dilated.    Aortic Valve: The aortic valve is a trileaflet valve. Mildly calcified cusps. Mildly restricted motion. There is mild stenosis. Aortic valve area by VTI is 1.5 cm². Aortic valve peak velocity is 2.8 m/s. Mean gradient is 19.7 mmHg. The dimensionless index is 0.49.    Mitral Valve: There is mild regurgitation.    Tricuspid Valve: There is moderate to severe regurgitation.    Pulmonary Artery: The estimated pulmonary artery systolic pressure is 45 mmHg.    IVC/SVC: Elevated venous pressure at 15 mmHg.    Pericardium: Ascites present.      PLAN:  -nephrology has been consulted for dialysis        Uremia  See discussion for end-stage renal disease needing dialysis      Hypertension  Patients blood pressure range in the last 24 hours was: BP  Min: 130/70  Max: 197/95.The patient's inpatient anti-hypertensive regimen is listed below:  Current Antihypertensives  amLODIPine tablet 10 mg, Daily, Oral  carvediloL tablet 12.5  mg, 2 times daily, Oral  hydrALAZINE injection 10 mg, Every 6 hours PRN, Intravenous  hydrALAZINE tablet 100 mg, 3 times daily, Oral    Plan  - BP is uncontrolled, will adjust as follows:  Continue home medications as patient reports he has been noncompliant with these, 1st dose of each given while in the emergency department  - add p.r.n. IV hydralazine with parameters      VTE Risk Mitigation (From admission, onward)           Ordered     heparin (porcine) injection 5,000 Units  Every 8 hours         10/12/24 2313     IP VTE HIGH RISK PATIENT  Once         10/12/24 2313     Place sequential compression device  Until discontinued         10/12/24 2313                    Discharge Planning   CARYN:      Code Status: Full Code   Is the patient medically ready for discharge?:     Reason for patient still in hospital PT TRENDING CONDITION, TREATMENT, DIALYSIS                      Jeny Vazquez PA-C  Department of Hospital Medicine   '[place] - Telemetry ([facility])

## 2024-10-14 NOTE — ASSESSMENT & PLAN NOTE
End-stage renal disease on HD TTS, uremia, hyperkalemia, hyperphosphatemia, metabolic acidosis, and with Goodpasture syndrome.  Patient has been noncompliant with dialysis x1 week.  BMP reviewed- noted Estimated Creatinine Clearance: 5.3 mL/min (A) (based on SCr of 17.4 mg/dL (H)). according to latest data. Based on current GFR, CKD stage is end stage.  Monitor UOP and serial BMP and adjust therapy as needed. Renally dose meds. Avoid nephrotoxic medications and procedures.  -patient presents with shortness of breath, volume overload, gross anasarca, drowsiness, uremic symptoms  -BUN 84, creatinine 16.8, CO2 19, potassium 5.4, magnesium level 2.4, phosphorus 9.1, calcium 8.8, BNP 2973  -ER physician did reach out to Nephrology who does not feel that patient requires urgent hemodialysis  -gave Lasix 80 mg IV x1 dose stat in ED        -O2 supplementation, incentive spirometry, p.r.n. Steve  -continue Velphoro  -Lokelma 10 g p.o. TID  -continuous pulse oximetry and cardiac monitoring  -nephrology consulted, follow-up recs  -Trend renal function panel

## 2024-10-14 NOTE — PROGRESS NOTES
Home Oxygen Evaluation - [facility] - Cardiopulmonary Department      Date Performed: 10/14/2024      1) Patient's Home O2 Sat on room air, while at rest: Room Air SpO2 At Rest: (!) 87 %        If O2 sats on room air at rest are 88% or below, patient qualifies.  Document O2 liter flow needed in Step 2.  If O2 sats are 89% or above, complete Step 3.        2)  If patient is not ambulated and O2 sats are <88%, what is the O2 liter flow required to meet ordered saturation?      If O2 sats on room air while exercising remain 89% or above patient does not qualify, no further testing needed Document N/A in step 3. If O2 sats on room air while exercising are 88% or below, continue to Step 4.    3) Patient's O2 Sat on room air while exercisin) Patient's O2 Sat while exercising on O2: 96%  at Ambulation O2 LPM: 2 LPM         (Must show improvement from #4 for patients to qualify)

## 2024-10-14 NOTE — ASSESSMENT & PLAN NOTE
Reactive hep C ab , pt denies prior hx of HCV  - Hep C RNA pending   - follow up pending RNA studies

## 2024-10-14 NOTE — PROGRESS NOTES
Nephrology Progress Note:    HPI:   Mr. Guzman is a 59-year-old male with a hx of ESRD ([facility]) who presented to the emergency room with shortness of breath and lower extremity edema and Nephrology has been asked to evaluate.     Patient's story is that he was last seen on dialysis on 10/02/2024, by our partner, Dr. Valentin.  His last dialysis session was on 10/05/2024.  According to that note, patient is homeless.  His medical history is pertinent for Goodpasture syndrome, substance abuse, GERD.    Interval History:   Chart reviewed/patient examined during his hemodialysis session.  He is tolerating the session without any cardiovascular instability.  He does arouse and will follow commands.  No complaints noted.    Health Status   Allergies:    has No Known Allergies.    Current medications:     Current Facility-Administered Medications:     acetaminophen tablet 650 mg, 650 mg, Oral, Q6H PRN, Marcella Zheng MD    albuterol-ipratropium 2.5 mg-0.5 mg/3 mL nebulizer solution 3 mL, 3 mL, Nebulization, Q6H PRN, Marcella Zheng MD, 3 mL at 10/13/24 2303    amLODIPine tablet 10 mg, 10 mg, Oral, Daily, Marcella Zheng MD, 10 mg at 10/13/24 0901    aspirin EC tablet 81 mg, 81 mg, Oral, Daily, Marcella Zheng MD, 81 mg at 10/13/24 0901    [COMPLETED] calcium gluconate 1 g in D5W 50 mL IVPB (MB+), 1 g, Intravenous, Once, Stopped at 10/13/24 1526 **AND** calcium gluconate 1 g in D5W 50 mL IVPB (MB+), 1 g, Intravenous, Q10 Min PRN, Eduardo Obrien DO    carvediloL tablet 12.5 mg, 12.5 mg, Oral, BID, Marcella Zheng MD, 12.5 mg at 10/13/24 2035    dextrose 10% bolus 125 mL 125 mL, 12.5 g, Intravenous, PRN, Marcella Zheng MD    dextrose 10% bolus 250 mL 250 mL, 25 g, Intravenous, PRN, Marcella Zheng MD    [COMPLETED] dextrose 10% bolus 500 mL 500 mL, 50 g, Intravenous, Once, Stopped at 10/13/24 1539 **AND** dextrose 10% bolus 250 mL 250 mL, 25 g, Intravenous, PRN **AND**  "[COMPLETED] insulin regular injection 10 Units 0.1 mL, 10 Units, Intravenous, Once, Eduardo Obrien DO, 10 Units at 10/13/24 1500    ferrous sulfate tablet 1 each, 1 tablet, Oral, Daily, Eduardo Obriena,     glucagon (human recombinant) injection 1 mg, 1 mg, Intramuscular, PRN, Marcella Zheng MD    glucose chewable tablet 16 g, 16 g, Oral, PRN, Marcella Zheng MD    glucose chewable tablet 24 g, 24 g, Oral, PRN, Marcella Zheng MD    heparin (porcine) injection 5,000 Units, 5,000 Units, Subcutaneous, Q8H, Marcella Zheng MD, 5,000 Units at 10/14/24 0534    hydrALAZINE injection 10 mg, 10 mg, Intravenous, Q6H PRN, Marcella Zheng MD, 10 mg at 10/14/24 0544    hydrALAZINE tablet 100 mg, 100 mg, Oral, TID, Marcella Zheng MD, 100 mg at 10/13/24 2041    hydrOXYzine HCL tablet 25 mg, 25 mg, Oral, TID PRN, Marcella Zheng MD, 25 mg at 10/14/24 0430    mupirocin 2 % ointment, , Nasal, BID, Jimbo Veras MD    mupirocin 2 % ointment, , Topical (Top), BID, Eduardo Obrien DO, Given at 10/13/24 2100    naloxone 0.4 mg/mL injection 0.02 mg, 0.02 mg, Intravenous, PRN, Marcella Zheng MD    ondansetron injection 4 mg, 4 mg, Intravenous, Q6H PRN, Marcella Zheng MD, 4 mg at 10/13/24 1548    sodium chloride 0.9% bolus 250 mL 250 mL, 250 mL, Intravenous, PRN, Jimbo Veras MD    sodium chloride 0.9% flush 10 mL, 10 mL, Intravenous, Q12H PRN, Marcella Zheng MD    sodium zirconium cyclosilicate packet 10 g, 10 g, Oral, TID, Eduardo Obriena, DO, 10 g at 10/13/24 2035    sucroferric oxyhydroxide Chew 1,000 mg, 2 tablet, Oral, TID WM, Marcella Zheng MD, 1,000 mg at 10/13/24 1202    tamsulosin 24 hr capsule 0.4 mg, 0.4 mg, Oral, Daily, Marcella Zheng MD, 0.4 mg at 10/13/24 0901         Objective       Physical Examination:   VS/Measurements   BP (!) 158/88   Pulse 62   Temp 98.2 °F (36.8 °C)   Resp 20   Ht 5' 7" (1.702 m)   Wt 101.3 kg (223 lb 5.2 oz)   SpO2 (!) 93%   BMI 34.98 " kg/m²   Vitals:    10/14/24 0905   BP:    Pulse: 62   Resp:    Temp:      Constitutional:  Chronically ill-appearing, Awakens, No acute distress.   HEENT:  Normocephalic and atraumatic, No scleral icterus.   Neck:  Supple. No tracheal deviation present.   Cardiovascular:  Regular rhythm.  Exam reveals no gallop and no friction rub.    Pulmonary/Chest:  Breath sounds normal. No rales.   Abdominal:  Soft, Bowel sounds are normal. There is no tenderness.   Musculoskeletal:  Exhibits no contracture or deformity.   Extremity:  No clubbing/cyanosis, Edema = present, LUE - thrill/bruit present  Neurological:  Somnolent but does awaken.  Does move all extremities.  Skin:  Warm and dry.   Psychiatric:  Normal mood and affect.   Vitals reviewed.    Laboratory Results   Today's Lab Results :    Recent Results (from the past 24 hours)   POCT glucose    Collection Time: 10/13/24  3:02 PM   Result Value Ref Range    POCT Glucose 112 (H) 70 - 110 mg/dL   Potassium    Collection Time: 10/13/24  4:42 PM   Result Value Ref Range    Potassium 5.5 (H) 3.5 - 5.1 mmol/L   Basic metabolic panel    Collection Time: 10/13/24  4:42 PM   Result Value Ref Range    Sodium 135 (L) 136 - 145 mmol/L    Potassium 5.5 (H) 3.5 - 5.1 mmol/L    Chloride 96 95 - 110 mmol/L    CO2 17 (L) 23 - 29 mmol/L    Glucose 132 (H) 70 - 110 mg/dL    BUN 89 (H) 6 - 20 mg/dL    Creatinine 17.4 (H) 0.5 - 1.4 mg/dL    Calcium 8.7 8.7 - 10.5 mg/dL    Anion Gap 22 (H) 8 - 16 mmol/L    eGFR 3 (A) >60 mL/min/1.73 m^2   POCT glucose    Collection Time: 10/13/24  5:27 PM   Result Value Ref Range    POCT Glucose 138 (H) 70 - 110 mg/dL   POCT glucose    Collection Time: 10/13/24  6:35 PM   Result Value Ref Range    POCT Glucose 122 (H) 70 - 110 mg/dL   POCT glucose    Collection Time: 10/13/24  8:32 PM   Result Value Ref Range    POCT Glucose 99 70 - 110 mg/dL   Basic metabolic panel    Collection Time: 10/13/24 11:59 PM   Result Value Ref Range    Sodium 136 136 - 145 mmol/L     Potassium 5.3 (H) 3.5 - 5.1 mmol/L    Chloride 94 (L) 95 - 110 mmol/L    CO2 18 (L) 23 - 29 mmol/L    Glucose 105 70 - 110 mg/dL    BUN 90 (H) 6 - 20 mg/dL    Creatinine 17.6 (H) 0.5 - 1.4 mg/dL    Calcium 8.8 8.7 - 10.5 mg/dL    Anion Gap 24 (H) 8 - 16 mmol/L    eGFR 3 (A) >60 mL/min/1.73 m^2   POCT glucose    Collection Time: 10/14/24  5:33 AM   Result Value Ref Range    POCT Glucose 99 70 - 110 mg/dL   Phosphorus    Collection Time: 10/14/24  5:35 AM   Result Value Ref Range    Phosphorus 10.2 (HH) 2.7 - 4.5 mg/dL   Magnesium    Collection Time: 10/14/24  5:35 AM   Result Value Ref Range    Magnesium 2.5 1.6 - 2.6 mg/dL   CBC Auto Differential    Collection Time: 10/14/24  5:35 AM   Result Value Ref Range    WBC 5.09 3.90 - 12.70 K/uL    RBC 2.98 (L) 4.60 - 6.20 M/uL    Hemoglobin 9.6 (L) 14.0 - 18.0 g/dL    Hematocrit 28.9 (L) 40.0 - 54.0 %    MCV 97 82 - 98 fL    MCH 32.2 (H) 27.0 - 31.0 pg    MCHC 33.2 32.0 - 36.0 g/dL    RDW 17.6 (H) 11.5 - 14.5 %    Platelets 113 (L) 150 - 450 K/uL    MPV 11.7 9.2 - 12.9 fL    Immature Granulocytes 1.0 (H) 0.0 - 0.5 %    Gran # (ANC) 3.1 1.8 - 7.7 K/uL    Immature Grans (Abs) 0.05 (H) 0.00 - 0.04 K/uL    Lymph # 0.6 (L) 1.0 - 4.8 K/uL    Mono # 0.9 0.3 - 1.0 K/uL    Eos # 0.4 0.0 - 0.5 K/uL    Baso # 0.03 0.00 - 0.20 K/uL    nRBC 0 0 /100 WBC    Gran % 61.4 38.0 - 73.0 %    Lymph % 11.2 (L) 18.0 - 48.0 %    Mono % 18.5 (H) 4.0 - 15.0 %    Eosinophil % 7.3 0.0 - 8.0 %    Basophil % 0.6 0.0 - 1.9 %    Differential Method Automated    Basic metabolic panel    Collection Time: 10/14/24  5:35 AM   Result Value Ref Range    Sodium 136 136 - 145 mmol/L    Potassium 5.3 (H) 3.5 - 5.1 mmol/L    Chloride 95 95 - 110 mmol/L    CO2 17 (L) 23 - 29 mmol/L    Glucose 81 70 - 110 mg/dL    BUN 96 (H) 6 - 20 mg/dL    Creatinine 17.4 (H) 0.5 - 1.4 mg/dL    Calcium 9.0 8.7 - 10.5 mg/dL    Anion Gap 24 (H) 8 - 16 mmol/L    eGFR 3 (A) >60 mL/min/1.73 m^2   Hepatic function panel    Collection  Time: 10/14/24  5:35 AM   Result Value Ref Range    Total Protein 7.4 6.0 - 8.4 g/dL    Albumin 3.5 3.5 - 5.2 g/dL    Total Bilirubin 0.9 0.1 - 1.0 mg/dL    Bilirubin, Direct 0.4 (H) 0.1 - 0.3 mg/dL    AST 38 10 - 40 U/L    ALT 30 10 - 44 U/L    Alkaline Phosphatase 176 (H) 55 - 135 U/L   Troponin I    Collection Time: 10/14/24  5:35 AM   Result Value Ref Range    Troponin I 0.070 (H) 0.000 - 0.026 ng/mL       @[facility]@ @[facility] [place]@    Assessment & Plan   Active Hospital Problems    Diagnosis  POA    *ESRD needing dialysis [N18.6, Z99.2]  Yes    Tricuspid regurgitation [I07.1]  Yes    Hepatitis C antibody positive in blood [R76.8]  Yes    Uremia [N19]  Yes    Volume overload [E87.70]  Yes    Facial rash [R21]  Yes    H/O: substance abuse [F19.11]  Yes    Normocytic anemia [D64.9]  Yes    Thrombocytopenia [D69.6]  Yes    Elevated troponin [R79.89]  Yes    Abnormal EKG [R94.31]  Yes    Elevated alkaline phosphatase level [R74.8]  Yes    Obesity (BMI 30.0-34.9) [E66.811]  Yes    Hypertension [I10]  Yes     Chronic      Resolved Hospital Problems   No resolved problems to display.       Assessment/Recommendations:     ESRD--[facility] TTS.  Hemodialysis today and again tomorrow (to return to a T/T/S schedule).  Follow laboratory studies     Mild hyperkalemia.  Hemodialysis today and follow laboratory studies.     Anemia of chronic kidney disease--received Mircera 225 mcg on 10/01/2024.  Iron saturation 23%.       Hyperparathyroidism of renal origin with severe hyperphosphatemia due to noncompliance.  Phosphate binders restarted, follow laboratory studies.     Hypertension with CKD-home medicines have been restarted.     History of Goodpasture's.        -Portions of this note were created using voice recognition software.  It is possible that there are errors, which have persisted, despite proofreading.  If there is a question regarding contents of this document, please contact me for clarification.

## 2024-10-14 NOTE — ASSESSMENT & PLAN NOTE
Echocardiogram with evidence of tricuspid regurgitation that is moderate - severe . The patient's most recent echocardiogram result is listed below. We will manage the valvular abnormality by outpatient f/u with cards . Resume dialysis     Echo    Result Date: 10/13/2024    Left Ventricle: The left ventricle is normal in size. Normal wall   thickness. There is concentric hypertrophy. Normal wall motion. There is   normal systolic function with a visually estimated ejection fraction of 60   - 65%. Ejection fraction is approximately 65%. There is normal diastolic   function.    Right Ventricle: Mild right ventricular enlargement. Systolic function   is normal.    Left Atrium: Left atrium is severely dilated.    Right Atrium: Right atrium is moderately dilated.    Aortic Valve: The aortic valve is a trileaflet valve. Mildly calcified   cusps. Mildly restricted motion. There is mild stenosis. Aortic valve area   by VTI is 1.5 cm². Aortic valve peak velocity is 2.8 m/s. Mean gradient is   19.7 mmHg. The dimensionless index is 0.49.    Mitral Valve: There is mild regurgitation.    Tricuspid Valve: There is moderate to severe regurgitation.    Pulmonary Artery: The estimated pulmonary artery systolic pressure is   45 mmHg.    IVC/SVC: Elevated venous pressure at 15 mmHg.    Pericardium: Ascites present.      Pt will need outpatient follow up with cards, order placed

## 2024-10-14 NOTE — ASSESSMENT & PLAN NOTE
----- Message from Kayla Iverson MD sent at 7/11/2022  5:56 PM CDT -----  Please inform patient - DEXA scan shows normal bone mineral density.    Patients blood pressure range in the last 24 hours was: BP  Min: 130/70  Max: 197/95.The patient's inpatient anti-hypertensive regimen is listed below:  Current Antihypertensives  amLODIPine tablet 10 mg, Daily, Oral  carvediloL tablet 12.5 mg, 2 times daily, Oral  hydrALAZINE injection 10 mg, Every 6 hours PRN, Intravenous  hydrALAZINE tablet 100 mg, 3 times daily, Oral    Plan  - BP is uncontrolled, will adjust as follows:  Continue home medications as patient reports he has been noncompliant with these, 1st dose of each given while in the emergency department  - add p.r.n. IV hydralazine with parameters

## 2024-10-14 NOTE — CONSULTS
[facility] [place])  Wound Care    Patient Name:  David Guzman   MRN:  [id number]  Date: 10/14/2024  Diagnosis: ESRD needing dialysis    History:     Past Medical History:   Diagnosis Date    Alcohol abuse     Anxiety     BPH (benign prostatic hyperplasia)     Cocaine abuse     Drug abuse     ESRD (end stage renal disease) on dialysis     GERD (gastroesophageal reflux disease)     Hypertension 2/2/2024    Obesity (BMI 30-39.9)     Renal disorder     Tobacco use        Social History     Socioeconomic History    Marital status:    Tobacco Use    Smoking status: Former    Smokeless tobacco: Never   Substance and Sexual Activity    Alcohol use: Yes     Comment: occasionally    Drug use: Yes     Types: Cocaine    Sexual activity: Not Currently     Social Drivers of Health     Financial Resource Strain: High Risk (10/13/2024)    Overall Financial Resource Strain (CARDIA)     Difficulty of Paying Living Expenses: Hard   Food Insecurity: Food Insecurity Present (10/13/2024)    Hunger Vital Sign     Worried About Running Out of Food in the Last Year: Often true     Ran Out of Food in the Last Year: Often true   Transportation Needs: Unmet Transportation Needs (10/13/2024)    TRANSPORTATION NEEDS     Transportation : Yes, it has kept me from medical appointments or from getting my medications.   Stress: Stress Concern Present (10/13/2024)    [employer] - Occupational Stress Questionnaire     Feeling of Stress : To some extent   Housing Stability: High Risk (10/13/2024)    Housing Stability Vital Sign     Unable to Pay for Housing in the Last Year: Yes     Homeless in the Last Year: No       Precautions:     Allergies as of 10/12/2024    (No Known Allergies)       WO Assessment Details/Treatment     Consulted on this 60 y/o M patient due to present on admission wounds. Patient admitted with fluid overload after missing multiple HD visits. Per chart review, PMH significant  for Goodpasture disease, on HD Tu/Th/Sat, obsity, BPH, GERD, substance abuse, and homelessness.   Facial sores noted on admit - multiple lesions to face in various stages of healing, likely related to uremic pruritus. Mupirocin ointment ordered per MAR, discussed with primary nurse. Cleansed with saline, and foam dressings applied to cheeks at this visit due to weeping scant sanguinous drainage, apply ointment per MAR.  Sacrum assessed - healing stage 3 PI noted to coccyx with full thickness tissue loss, measures 2x0.5x0.1cm, moist yellow wound bed. Cleansed with saline and moisture barrier paste applied to coat.   Patient is on Isotour bed, recommend apply JEANNIE pump, frequent repositioning, avoid diaper use.     Recommendations made to primary team for wound care per orders.        10/14/24 0930   WOCN Assessment   WOCN Total Time (mins) 45   Visit Date 10/14/24   Visit Time 0930   Consult Type New   WOCN Speciality Wound   Intervention assessed;applied;chart review;coordination of care;team conference;orders   Teaching on-going;complication        Wound 10/14/24 Ulceration Cheek   Date First Assessed: 10/14/24   Present on Original Admission: Yes  Primary Wound Type: Ulceration  Location: (c) Cheek   Wound Image    Dressing Appearance Intact   Drainage Amount Scant   Drainage Characteristics/Odor Sanguineous   Appearance Red   Tissue loss description Partial thickness   Periwound Area Intact   Care Cleansed with:;Sterile normal saline;Applied:;Skin Barrier   Dressing Foam   Dressing Change Due 10/16/24        Wound 10/14/24 Pressure Injury Coccyx   Date First Assessed: 10/14/24   Present on Original Admission: Yes  Primary Wound Type: Pressure Injury  Location: Coccyx   Wound Image    Pressure Injury Stage 3   Dressing Appearance Open to air   Drainage Amount Scant   Drainage Characteristics/Odor Serous   Appearance Yellow;Adipose;Moist   Tissue loss description Full thickness   Yellow (%), Wound Tissue Color 100 %    Periwound Area Intact   Wound Edges Defined   Wound Length (cm) 2 cm   Wound Width (cm) 0.5 cm   Wound Depth (cm) 0.1 cm   Wound Volume (cm^3) 0.1 cm^3   Wound Surface Area (cm^2) 1 cm^2   Care Cleansed with:;Sterile normal saline;Applied:;Skin Barrier  (moisture barrier paste)     10/14/2024

## 2024-10-14 NOTE — PLAN OF CARE
[facility] - Telemetry (Hospital)  Initial Discharge Assessment       Primary Care Provider: Martha Chung MD    Admission Diagnosis: Hyperkalemia [E87.5]  Primary hypertension [I10]  SOB (shortness of breath) [R06.02]  End stage renal disease on dialysis [N18.6, Z99.2]  NSTEMI (non-ST elevated myocardial infarction) [I21.4]  Noncompliance [Z91.199]  Elevated troponin I level [R79.89]  Chest pain [R07.9]  Hyperphosphatemia associated with renal failure [E83.39, N19]    Admission Date: 10/12/2024  Expected Discharge Date:     Transition of Care Barriers: None    Payor: MEDICAID / Plan: HEALTHY BLUE ([employer]) / Product Type: Managed Medicaid /     Extended Emergency Contact Information  Primary Emergency Contact: Nicole Valenzuela   [facility]  Home Phone: [phone]  Mobile Phone: [phone]  Relation: Mother    Discharge Plan A: Home       No Pharmacies Listed    Initial Assessment (most recent)       Adult Discharge Assessment - 10/14/24 1503          Discharge Assessment    Assessment Type Discharge Planning Assessment     Confirmed/corrected address, phone number and insurance Yes     Confirmed Demographics Correct on Facesheet     Source of Information patient     When was your last doctors appointment? --   pt unable to recall    Does patient/caregiver understand observation status Yes     Reason For Admission Have not had dialysis     People in Home alone     Do you expect to return to your current living situation? Yes     Do you have help at home or someone to help you manage your care at home? No     Prior to hospitilization cognitive status: Alert/Oriented     Current cognitive status: --   pt feel asleep multiple times during assessment    Walking or Climbing Stairs Difficulty no     Dressing/Bathing Difficulty no     Equipment Currently Used at Home none     Readmission within 30 days? No     Patient currently being followed by outpatient case management? No     Do you currently have  service(s) that help you manage your care at home? No     Do you take prescription medications? Yes     Do you have prescription coverage? Yes     Coverage Medicaid     Do you have any problems affording any of your prescribed medications? No     Is the patient taking medications as prescribed? yes     Who is going to help you get home at discharge? pt may need assistance     How do you get to doctors appointments? car, drives self     Are you on dialysis? Yes     Dialysis Name and Scheduled days [facility] in [place]. Tues, Thur, and Sat     Do you take coumadin? No     Discharge Plan A Home     Discharge Plan discussed with: Patient     Transition of Care Barriers None                   Swer met with pt in room to complete discharge assessment. Pt feel asleep multiple times during assessment. Swer attempted to reach pt's mother but no answer. Pt reports no needs and is set up with dialysis in [place]. HUMA,MSW

## 2024-10-14 NOTE — SUBJECTIVE & OBJECTIVE
Interval History: Pt with C/O SOB stable since yesterday. C/o abdominal distension , pt is not wearing O2 right now, he says he does not need it right now. Dialysis session planned for today.     Review of Systems   Constitutional:  Positive for activity change. Negative for appetite change and fever.   HENT:  Positive for facial swelling. Negative for postnasal drip.    Eyes:  Negative for pain, discharge and itching.   Respiratory:  Positive for shortness of breath. Negative for apnea, cough and chest tightness.    Cardiovascular:  Positive for leg swelling.   Gastrointestinal:  Positive for abdominal distention. Negative for abdominal pain, nausea and vomiting.   Endocrine: Negative for cold intolerance.   Genitourinary:  Negative for difficulty urinating.   Musculoskeletal:  Positive for back pain.   Skin:  Positive for rash and wound.   Neurological:  Negative for dizziness.   Hematological:  Negative for adenopathy.   Psychiatric/Behavioral:  Negative for agitation and behavioral problems.      Objective:     Vital Signs (Most Recent):  Temp: 98.2 °F (36.8 °C) (10/14/24 0742)  Pulse: 62 (10/14/24 0905)  Resp: 20 (10/14/24 0742)  BP: (!) 158/88 (10/14/24 0742)  SpO2: (!) 93 % (10/14/24 0742) Vital Signs (24h Range):  Temp:  [97.6 °F (36.4 °C)-98.2 °F (36.8 °C)] 98.2 °F (36.8 °C)  Pulse:  [54-73] 62  Resp:  [16-20] 20  SpO2:  [93 %-97 %] 93 %  BP: (130-165)/(70-95) 158/88     Weight: 101.3 kg (223 lb 5.2 oz)  Body mass index is 34.98 kg/m².  No intake or output data in the 24 hours ending 10/14/24 0957      Physical Exam  Constitutional:       General: He is not in acute distress.     Appearance: He is ill-appearing and toxic-appearing. He is not diaphoretic.   HENT:      Head: Normocephalic and atraumatic.      Comments: Periorbital edema      Nose: No congestion or rhinorrhea.      Mouth/Throat:      Mouth: Mucous membranes are dry.   Eyes:      Extraocular Movements: Extraocular movements intact.    Cardiovascular:      Rate and Rhythm: Normal rate and regular rhythm.      Heart sounds: Murmur heard.   Pulmonary:      Breath sounds: No stridor. Rhonchi present. No wheezing.   Abdominal:      General: Bowel sounds are normal. There is distension.      Tenderness: There is no abdominal tenderness.   Genitourinary:     Comments: Deferred   Musculoskeletal:         General: Swelling present. No tenderness or signs of injury.      Right lower leg: Edema (BLE edema to knee , 1+ pitting at ankles) present.      Left lower leg: Edema present.   Skin:     General: Skin is warm and dry.      Findings: Lesion (facial and abdomen) present.   Neurological:      Mental Status: He is alert.      Motor: Weakness present.   Psychiatric:         Mood and Affect: Mood normal.         Behavior: Behavior normal.         Thought Content: Thought content normal.             Significant Labs: All pertinent labs within the past 24 hours have been reviewed.  Recent Lab Results  (Last 5 results in the past 24 hours)        10/14/24  0535   10/14/24  0533   10/13/24  2359   10/13/24  2032   10/13/24  1835        Albumin 3.5                              ALT 30               Anion Gap 24     24           AST 38               Baso # 0.03               Basophil % 0.6               Bilirubin Direct 0.4               BILIRUBIN TOTAL 0.9  Comment: For infants and newborns, interpretation of results should be based  on gestational age, weight and in agreement with clinical  observations.    Premature Infant recommended reference ranges:  Up to 24 hours.............<8.0 mg/dL  Up to 48 hours............<12.0 mg/dL  3-5 days..................<15.0 mg/dL  6-29 days.................<15.0 mg/dL                 BUN 96     90           Calcium 9.0     8.8           Chloride 95     94           CO2 17     18           Creatinine 17.4     17.6           Differential Method Automated               eGFR 3     3           Eos # 0.4               Eos  % 7.3               Glucose 81     105           Gran # (ANC) 3.1               Gran % 61.4               Hematocrit 28.9               Hemoglobin 9.6               Immature Grans (Abs) 0.05  Comment: Mild elevation in immature granulocytes is non specific and   can be seen in a variety of conditions including stress response,   acute inflammation, trauma and pregnancy. Correlation with other   laboratory and clinical findings is essential.                 Immature Granulocytes 1.0               Lymph # 0.6               Lymph % 11.2               Magnesium  2.5               MCH 32.2               MCHC 33.2               MCV 97               Mono # 0.9               Mono % 18.5               MPV 11.7               nRBC 0               Phosphorus Level 10.2  Comment: phosphorus  critical result(s) called and verbal readback obtained   from Silvia Servin rn  by MARY 10/14/2024 07:12                 Platelet Count 113               POCT Glucose   99     99   122       Potassium 5.3     5.3           PROTEIN TOTAL 7.4               RBC 2.98               RDW 17.6               Sodium 136     136           Troponin I 0.070  Comment: The reference interval for Troponin I represents the 99th percentile   cutoff   for our facility and is consistent with 3rd generation assay   performance.                 WBC 5.09                                      Significant Imaging: I have reviewed all pertinent imaging results/findings within the past 24 hours.  Imaging Results              X-Ray Chest AP Portable (Final result)  Result time 10/12/24 20:46:37      Final result by Braulio Willams MD (10/12/24 20:46:37)                   Impression:      No acute abnormality.      Electronically signed by: Braulio Willams  Date:    10/12/2024  Time:    20:46               Narrative:    EXAMINATION:  XR CHEST AP PORTABLE    CLINICAL HISTORY:  sob;    TECHNIQUE:  Single frontal view of the chest was  performed.    COMPARISON:  None    FINDINGS:  The lungs are clear, with normal appearance of pulmonary vasculature and no pleural effusion or pneumothorax.    Cardiomegaly.  The hilar and mediastinal contours are unremarkable.    Bones are intact.

## 2024-10-14 NOTE — PLAN OF CARE
Problem: Adult Inpatient Plan of Care  Goal: Plan of Care Review  Outcome: Progressing  Goal: Patient-Specific Goal (Individualized)  Outcome: Progressing  Goal: Absence of Hospital-Acquired Illness or Injury  Outcome: Progressing  Goal: Optimal Comfort and Wellbeing  Outcome: Progressing  Goal: Readiness for Transition of Care  Outcome: Progressing     Problem: Hemodialysis  Goal: Safe, Effective Therapy Delivery  Outcome: Progressing  Goal: Effective Tissue Perfusion  Outcome: Progressing  Goal: Absence of Infection Signs and Symptoms  Outcome: Progressing     Problem: Fall Injury Risk  Goal: Absence of Fall and Fall-Related Injury  Outcome: Progressing     Problem: Skin Injury Risk Increased  Goal: Skin Health and Integrity  Outcome: Progressing

## 2024-10-14 NOTE — PLAN OF CARE
Duration: 3.5 hours     Stable/unstable: stable    Ultrafiltration volume: 4 liters     Notes: Tolerated, No access issues, Dr. Phillips visited during hd

## 2024-10-15 VITALS
BODY MASS INDEX: 35.05 KG/M2 | WEIGHT: 223.31 LBS | SYSTOLIC BLOOD PRESSURE: 170 MMHG | HEIGHT: 67 IN | TEMPERATURE: 97 F | OXYGEN SATURATION: 100 % | HEART RATE: 64 BPM | DIASTOLIC BLOOD PRESSURE: 96 MMHG | RESPIRATION RATE: 22 BRPM

## 2024-10-15 LAB
ALBUMIN SERPL BCP-MCNC: 3.6 G/DL (ref 3.5–5.2)
ALP SERPL-CCNC: 209 U/L (ref 55–135)
ALT SERPL W/O P-5'-P-CCNC: 34 U/L (ref 10–44)
ANION GAP SERPL CALC-SCNC: 18 MMOL/L (ref 8–16)
ANION GAP SERPL CALC-SCNC: 18 MMOL/L (ref 8–16)
ANION GAP SERPL CALC-SCNC: 19 MMOL/L (ref 8–16)
AST SERPL-CCNC: 51 U/L (ref 10–40)
BASOPHILS # BLD AUTO: 0.04 K/UL (ref 0–0.2)
BASOPHILS NFR BLD: 0.7 % (ref 0–1.9)
BILIRUB DIRECT SERPL-MCNC: 0.4 MG/DL (ref 0.1–0.3)
BILIRUB SERPL-MCNC: 0.9 MG/DL (ref 0.1–1)
BUN SERPL-MCNC: 52 MG/DL (ref 6–20)
BUN SERPL-MCNC: 55 MG/DL (ref 6–20)
BUN SERPL-MCNC: 56 MG/DL (ref 6–20)
CALCIUM SERPL-MCNC: 8.5 MG/DL (ref 8.7–10.5)
CALCIUM SERPL-MCNC: 8.7 MG/DL (ref 8.7–10.5)
CALCIUM SERPL-MCNC: 8.8 MG/DL (ref 8.7–10.5)
CHLORIDE SERPL-SCNC: 94 MMOL/L (ref 95–110)
CHLORIDE SERPL-SCNC: 94 MMOL/L (ref 95–110)
CHLORIDE SERPL-SCNC: 95 MMOL/L (ref 95–110)
CO2 SERPL-SCNC: 23 MMOL/L (ref 23–29)
CREAT SERPL-MCNC: 12.4 MG/DL (ref 0.5–1.4)
CREAT SERPL-MCNC: 12.5 MG/DL (ref 0.5–1.4)
CREAT SERPL-MCNC: 12.5 MG/DL (ref 0.5–1.4)
DIFFERENTIAL METHOD BLD: ABNORMAL
EOSINOPHIL # BLD AUTO: 0.5 K/UL (ref 0–0.5)
EOSINOPHIL NFR BLD: 7.8 % (ref 0–8)
ERYTHROCYTE [DISTWIDTH] IN BLOOD BY AUTOMATED COUNT: 17.1 % (ref 11.5–14.5)
EST. GFR  (NO RACE VARIABLE): 4 ML/MIN/1.73 M^2
GLUCOSE SERPL-MCNC: 104 MG/DL (ref 70–110)
GLUCOSE SERPL-MCNC: 106 MG/DL (ref 70–110)
GLUCOSE SERPL-MCNC: 82 MG/DL (ref 70–110)
HCT VFR BLD AUTO: 32.2 % (ref 40–54)
HGB BLD-MCNC: 10.4 G/DL (ref 14–18)
IMM GRANULOCYTES # BLD AUTO: 0.04 K/UL (ref 0–0.04)
IMM GRANULOCYTES NFR BLD AUTO: 0.7 % (ref 0–0.5)
LYMPHOCYTES # BLD AUTO: 0.7 K/UL (ref 1–4.8)
LYMPHOCYTES NFR BLD: 11.5 % (ref 18–48)
MAGNESIUM SERPL-MCNC: 2.3 MG/DL (ref 1.6–2.6)
MCH RBC QN AUTO: 31.6 PG (ref 27–31)
MCHC RBC AUTO-ENTMCNC: 32.3 G/DL (ref 32–36)
MCV RBC AUTO: 98 FL (ref 82–98)
MONOCYTES # BLD AUTO: 1.2 K/UL (ref 0.3–1)
MONOCYTES NFR BLD: 19.1 % (ref 4–15)
NEUTROPHILS # BLD AUTO: 3.6 K/UL (ref 1.8–7.7)
NEUTROPHILS NFR BLD: 60.2 % (ref 38–73)
NRBC BLD-RTO: 0 /100 WBC
PHOSPHATE SERPL-MCNC: 7.5 MG/DL (ref 2.7–4.5)
PLATELET # BLD AUTO: 122 K/UL (ref 150–450)
PMV BLD AUTO: 10.9 FL (ref 9.2–12.9)
POCT GLUCOSE: 131 MG/DL (ref 70–110)
POCT GLUCOSE: 89 MG/DL (ref 70–110)
POCT GLUCOSE: 94 MG/DL (ref 70–110)
POTASSIUM SERPL-SCNC: 4.3 MMOL/L (ref 3.5–5.1)
POTASSIUM SERPL-SCNC: 4.3 MMOL/L (ref 3.5–5.1)
POTASSIUM SERPL-SCNC: 4.5 MMOL/L (ref 3.5–5.1)
PROT SERPL-MCNC: 7.7 G/DL (ref 6–8.4)
RBC # BLD AUTO: 3.29 M/UL (ref 4.6–6.2)
SODIUM SERPL-SCNC: 135 MMOL/L (ref 136–145)
SODIUM SERPL-SCNC: 135 MMOL/L (ref 136–145)
SODIUM SERPL-SCNC: 137 MMOL/L (ref 136–145)
WBC # BLD AUTO: 6.01 K/UL (ref 3.9–12.7)

## 2024-10-15 PROCEDURE — 84100 ASSAY OF PHOSPHORUS: CPT | Performed by: STUDENT IN AN ORGANIZED HEALTH CARE EDUCATION/TRAINING PROGRAM

## 2024-10-15 PROCEDURE — 36415 COLL VENOUS BLD VENIPUNCTURE: CPT | Performed by: STUDENT IN AN ORGANIZED HEALTH CARE EDUCATION/TRAINING PROGRAM

## 2024-10-15 PROCEDURE — 85025 COMPLETE CBC W/AUTO DIFF WBC: CPT | Performed by: STUDENT IN AN ORGANIZED HEALTH CARE EDUCATION/TRAINING PROGRAM

## 2024-10-15 PROCEDURE — 25000003 PHARM REV CODE 250: Performed by: STUDENT IN AN ORGANIZED HEALTH CARE EDUCATION/TRAINING PROGRAM

## 2024-10-15 PROCEDURE — 25000003 PHARM REV CODE 250: Performed by: INTERNAL MEDICINE

## 2024-10-15 PROCEDURE — 80048 BASIC METABOLIC PNL TOTAL CA: CPT | Mod: 91 | Performed by: STUDENT IN AN ORGANIZED HEALTH CARE EDUCATION/TRAINING PROGRAM

## 2024-10-15 PROCEDURE — 5A1D70Z PERFORMANCE OF URINARY FILTRATION, INTERMITTENT, LESS THAN 6 HOURS PER DAY: ICD-10-PCS | Performed by: STUDENT IN AN ORGANIZED HEALTH CARE EDUCATION/TRAINING PROGRAM

## 2024-10-15 PROCEDURE — 80076 HEPATIC FUNCTION PANEL: CPT | Performed by: STUDENT IN AN ORGANIZED HEALTH CARE EDUCATION/TRAINING PROGRAM

## 2024-10-15 PROCEDURE — 99900035 HC TECH TIME PER 15 MIN (STAT)

## 2024-10-15 PROCEDURE — 80100016 HC MAINTENANCE HEMODIALYSIS

## 2024-10-15 PROCEDURE — 83735 ASSAY OF MAGNESIUM: CPT | Performed by: STUDENT IN AN ORGANIZED HEALTH CARE EDUCATION/TRAINING PROGRAM

## 2024-10-15 PROCEDURE — 94761 N-INVAS EAR/PLS OXIMETRY MLT: CPT

## 2024-10-15 PROCEDURE — 80048 BASIC METABOLIC PNL TOTAL CA: CPT | Performed by: STUDENT IN AN ORGANIZED HEALTH CARE EDUCATION/TRAINING PROGRAM

## 2024-10-15 PROCEDURE — 63600175 PHARM REV CODE 636 W HCPCS: Performed by: INTERNAL MEDICINE

## 2024-10-15 RX ORDER — MUPIROCIN 20 MG/G
OINTMENT TOPICAL 2 TIMES DAILY
Qty: 22 G | Refills: 0 | Status: SHIPPED | OUTPATIENT
Start: 2024-10-15 | End: 2024-11-06

## 2024-10-15 RX ORDER — FERROUS SULFATE 325(65) MG
325 TABLET, DELAYED RELEASE (ENTERIC COATED) ORAL DAILY
Qty: 30 TABLET | Refills: 11 | Status: SHIPPED | OUTPATIENT
Start: 2024-10-15 | End: 2025-10-15

## 2024-10-15 RX ADMIN — SUCROFERRIC OXYHYDROXIDE 1000 MG: 500 TABLET, CHEWABLE ORAL at 11:10

## 2024-10-15 RX ADMIN — MUPIROCIN: 20 OINTMENT TOPICAL at 08:10

## 2024-10-15 RX ADMIN — CARVEDILOL 12.5 MG: 12.5 TABLET, FILM COATED ORAL at 08:10

## 2024-10-15 RX ADMIN — ASPIRIN 81 MG: 81 TABLET, COATED ORAL at 08:10

## 2024-10-15 RX ADMIN — TAMSULOSIN HYDROCHLORIDE 0.4 MG: 0.4 CAPSULE ORAL at 08:10

## 2024-10-15 RX ADMIN — AMLODIPINE BESYLATE 10 MG: 10 TABLET ORAL at 08:10

## 2024-10-15 RX ADMIN — FERROUS SULFATE TAB 325 MG (65 MG ELEMENTAL FE) 1 EACH: 325 (65 FE) TAB at 08:10

## 2024-10-15 RX ADMIN — HEPARIN SODIUM 5000 UNITS: 5000 INJECTION INTRAVENOUS; SUBCUTANEOUS at 05:10

## 2024-10-15 RX ADMIN — HYDRALAZINE HYDROCHLORIDE 100 MG: 50 TABLET ORAL at 08:10

## 2024-10-15 RX ADMIN — SUCROFERRIC OXYHYDROXIDE 1000 MG: 500 TABLET, CHEWABLE ORAL at 08:10

## 2024-10-15 NOTE — PLAN OF CARE
[facility] - Telemetry (Hospital)  Discharge Final Note    Primary Care Provider: Martha Chung MD    Expected Discharge Date: 10/15/2024    Final Discharge Note (most recent)       Final Note - 10/15/24 1441          Final Note    Assessment Type Final Discharge Note     Anticipated Discharge Disposition Home or Self Care        Post-Acute Status    Coverage [employer]     Discharge Delays None known at this time                     Important Message from Medicare             Contact Info       Martha Chung MD   Specialty: Internal Medicine   Relationship: PCP - General    [address] [place] [id number]   Phone: [phone]       Next Steps: Schedule an appointment as soon as possible for a visit in 1 week(s)          DC Disposition: home with family  Family Notified: Patient at bedside  Transportation: personal transportation    Patient had no equipment or placement needs from .     Patient has resources to schedule hospital follow up with non-[facility] PCP on AVS.

## 2024-10-15 NOTE — ASSESSMENT & PLAN NOTE
-patient does have abdominal distention most likely related to gross anasarca, no abdominal tenderness to palpation  -Abdominal U/S 10/13 noted:   1. The liver has a nodular surface and is heterogeneous in appearance.  This is characteristic of hepatic cirrhosis.   2. There is a small amount of ascites.   3. Cholelithiasis.  The common bile duct is dilated. It has a diameter of 7 mm.     -alk phos 209   -Trend on CMP

## 2024-10-15 NOTE — ASSESSMENT & PLAN NOTE
Volume overload with gross anasarca related to noncompliance with dialysis  -chest x-ray negative  -BNP 2973  -previous echocardiogram 07/11/2023 with EF greater than 65%  -Results for orders placed during the hospital encounter of 10/12/24  Echo  Interpretation Summary    Left Ventricle: The left ventricle is normal in size. Normal wall thickness. There is concentric hypertrophy. Normal wall motion. There is normal systolic function with a visually estimated ejection fraction of 60 - 65%. Ejection fraction is approximately 65%. There is normal diastolic function.    Right Ventricle: Mild right ventricular enlargement. Systolic function is normal.    Left Atrium: Left atrium is severely dilated.    Right Atrium: Right atrium is moderately dilated.    Aortic Valve: The aortic valve is a trileaflet valve. Mildly calcified cusps. Mildly restricted motion. There is mild stenosis. Aortic valve area by VTI is 1.5 cm². Aortic valve peak velocity is 2.8 m/s. Mean gradient is 19.7 mmHg. The dimensionless index is 0.49.    Mitral Valve: There is mild regurgitation.    Tricuspid Valve: There is moderate to severe regurgitation.    Pulmonary Artery: The estimated pulmonary artery systolic pressure is 45 mmHg.    IVC/SVC: Elevated venous pressure at 15 mmHg.    Pericardium: Ascites present.      PLAN:  -nephrology has been consulted for dialysis

## 2024-10-15 NOTE — PLAN OF CARE
Problem: Adult Inpatient Plan of Care  Goal: Plan of Care Review  Outcome: Met  Goal: Patient-Specific Goal (Individualized)  Outcome: Met  Goal: Absence of Hospital-Acquired Illness or Injury  Outcome: Met  Goal: Optimal Comfort and Wellbeing  Outcome: Met  Goal: Readiness for Transition of Care  Outcome: Met     Problem: Hemodialysis  Goal: Safe, Effective Therapy Delivery  Outcome: Met  Goal: Effective Tissue Perfusion  Outcome: Met  Goal: Absence of Infection Signs and Symptoms  Outcome: Met     Problem: Fall Injury Risk  Goal: Absence of Fall and Fall-Related Injury  Outcome: Met     Problem: Skin Injury Risk Increased  Goal: Skin Health and Integrity  Outcome: Met     Problem: Wound  Goal: Optimal Coping  Outcome: Met  Goal: Optimal Functional Ability  Outcome: Met  Goal: Absence of Infection Signs and Symptoms  Outcome: Met  Goal: Improved Oral Intake  Outcome: Met  Goal: Optimal Pain Control and Function  Outcome: Met  Goal: Skin Health and Integrity  Outcome: Met  Goal: Optimal Wound Healing  Outcome: Met

## 2024-10-15 NOTE — PROGRESS NOTES
[facility] Medicine  Progress Note    Patient Name: David Guzman  MRN: [id number]  Patient Class: IP- Inpatient   Admission Date: 10/12/2024  Length of Stay: 3 days  Attending Physician: Eduardo Obrien DO  Primary Care Provider: Martha Chung MD        Subjective:     Principal Problem:Acute hypoxemic respiratory failure        HPI:  59-year-old man with history of end-stage renal disease on hemodialysis TTS, Goodpasture syndrome, hypertension, BPH, gastroesophageal reflux disease, obesity, right upper extremity cellulitis, cocaine abuse, substance abuse, gastroesophageal reflux disease, and obesity who presents to the emergency department with complaint of shortness of breath and swelling everywhere over the last several days.  Symptoms are constant, moderate-to-severe in nature, and with no aggravating or alleviating factor identified.  Patient has missed dialysis over the last week as he has recently moved and has been having transportation issues.  Patient does admit that he is noncompliant with his home medications to include antihypertensives.  Patient denies any associated chest pain, cough, nausea, vomiting, fevers, chills, diarrhea, constipation.  Patient reports he does still produce a very small amount of urine on occasion.  Abdomen is significantly distended but without abdominal pain.  Patient also has a facial rash that is pruritic; he has been scratching it and has some/minimal active bleeding appreciated.  Abnormal labs include hemoglobin 9.4, hematocrit 27.5, platelets 101, potassium 5.4, CO2 19, BUN 84, creatinine 16.8, phosphorus 9.1, alk phos 203, BNP 2973, and troponin 0.095.  ER physician did reach out to Nephrology who does not feel that patient requires emergent hemodialysis.    Last hospital admit 2/1-02/02/2024 for right upper extremity cellulitis requiring IV vancomycin and discharged home with oral doxycycline.    Overview/Hospital  Course:  Admitted to Hospital Medicine for evaluation of fluid overload concerns in the setting of multiple missed dialysis sessions.  Nephrology consulted for inpatient dialysis.  Troponins remained flat, and patient denied any complaints of chest pains.  Started on Lokelma scheduled for hyperkalemia.  Nephrology with plans for dialysis.Hep C ab reactive, RNA studies pending. Pt denies know h/o Hep C . Echo resulted with EF 60-65%, RA mod dilated, LA severly dialted, mild AS with JOSE 1.5cm2, mod- severe TR PAP 45 mmHG , ascities present.     Interval History:  Patient is resting in bed lying on left side.  Very fatigued today, sleeping throughout interview.  Patient is arousable.  Without complaints.  Patient had 4 L removed with dialysis yesterday and plans for another dialysis session today    Review of Systems   Constitutional:  Positive for activity change. Negative for chills and fever.   HENT:  Negative for congestion and postnasal drip.    Eyes:  Negative for pain, discharge and itching.   Respiratory:  Negative for cough and choking.    Cardiovascular:  Positive for leg swelling.   Gastrointestinal:  Negative for abdominal pain.   Musculoskeletal:  Negative for arthralgias.   Skin:  Positive for rash and wound.   Hematological:  Negative for adenopathy.     Objective:     Vital Signs (Most Recent):  Temp: 97.4 °F (36.3 °C) (10/15/24 0820)  Pulse: (!) 56 (10/15/24 0830)  Resp: (!) 22 (10/15/24 0820)  BP: (!) 157/90 (10/15/24 0820)  SpO2: (!) 92 % (10/15/24 0820) Vital Signs (24h Range):  Temp:  [97.4 °F (36.3 °C)-98.4 °F (36.9 °C)] 97.4 °F (36.3 °C)  Pulse:  [56-68] 56  Resp:  [18-22] 22  SpO2:  [90 %-96 %] 92 %  BP: (150-175)/() 157/90     Weight: 101.3 kg (223 lb 5.2 oz)  Body mass index is 34.98 kg/m².    Intake/Output Summary (Last 24 hours) at 10/15/2024 1047  Last data filed at 10/15/2024 0450  Gross per 24 hour   Intake 480 ml   Output 4500 ml   Net -4020 ml         Physical  Exam  Constitutional:       General: He is not in acute distress.     Appearance: He is obese. He is ill-appearing. He is not diaphoretic.   HENT:      Head: Normocephalic and atraumatic.      Comments: Periorbital edema to left eye     Nose: Nose normal.      Mouth/Throat:      Mouth: Mucous membranes are moist.   Eyes:      Extraocular Movements: Extraocular movements intact.   Cardiovascular:      Rate and Rhythm: Normal rate and regular rhythm.      Heart sounds: Murmur heard.   Pulmonary:      Effort: No respiratory distress.      Breath sounds: No stridor. Rhonchi (Sparse) present.      Comments: On room air currently  No shortness of breath  Abdominal:      General: Bowel sounds are normal. There is no distension.      Palpations: Abdomen is soft.   Genitourinary:     Comments: Deferred  Musculoskeletal:      Cervical back: Normal range of motion. No rigidity.      Right lower leg: Edema present.      Left lower leg: Edema present.      Comments: Bilateral lower extremity edema 1+ pitting   Skin:     General: Skin is warm and dry.      Findings: Rash (Erythematous patches with bleeding on chest and face) present.   Neurological:      Motor: Weakness present.   Psychiatric:      Comments: Patient is fatigued throughout interview.  Quickly falls back to sleep             Significant Labs: All pertinent labs within the past 24 hours have been reviewed.  Recent Lab Results  (Last 5 results in the past 24 hours)        10/15/24  0637   10/15/24  0512   10/15/24  0016   10/14/24  2020   10/14/24  1821        Albumin 3.6                              ALT 34               Anion Gap 19     18     18       AST 51               Baso # 0.04               Basophil % 0.7               Bilirubin Direct 0.4               BILIRUBIN TOTAL 0.9  Comment: For infants and newborns, interpretation of results should be based  on gestational age, weight and in agreement with clinical  observations.    Premature Infant  recommended reference ranges:  Up to 24 hours.............<8.0 mg/dL  Up to 48 hours............<12.0 mg/dL  3-5 days..................<15.0 mg/dL  6-29 days.................<15.0 mg/dL                 BUN 55     52     49       Calcium 8.8     8.7     8.8       Chloride 95     94     92       CO2 23     23     24       Creatinine 12.4     12.5     12.2       Differential Method Automated               eGFR 4     4     4       Eos # 0.5               Eos % 7.8               Glucose 82     104     111       Gran # (ANC) 3.6               Gran % 60.2               Hematocrit 32.2               Hemoglobin 10.4               Immature Grans (Abs) 0.04  Comment: Mild elevation in immature granulocytes is non specific and   can be seen in a variety of conditions including stress response,   acute inflammation, trauma and pregnancy. Correlation with other   laboratory and clinical findings is essential.                 Immature Granulocytes 0.7               Lymph # 0.7               Lymph % 11.5               Magnesium  2.3               MCH 31.6               MCHC 32.3               MCV 98               Mono # 1.2               Mono % 19.1               MPV 10.9               nRBC 0               Phosphorus Level 7.5               Platelet Count 122               POCT Glucose   89     95         Potassium 4.5     4.3     4.0       PROTEIN TOTAL 7.7               RBC 3.29               RDW 17.1               Sodium 137     135     134       WBC 6.01                                      Significant Imaging: I have reviewed all pertinent imaging results/findings within the past 24 hours.    Assessment/Plan:      Hepatitis C antibody positive in blood  Reactive hep C ab , pt denies prior hx of HCV  - Hep C RNA pending   - follow up pending RNA studies       Tricuspid regurgitation  Echocardiogram with evidence of tricuspid regurgitation that is moderate - severe . The patient's most recent echocardiogram result is listed  below. We will manage the valvular abnormality by outpatient f/u with cards . Resume dialysis     Echo    Result Date: 10/13/2024    Left Ventricle: The left ventricle is normal in size. Normal wall   thickness. There is concentric hypertrophy. Normal wall motion. There is   normal systolic function with a visually estimated ejection fraction of 60   - 65%. Ejection fraction is approximately 65%. There is normal diastolic   function.    Right Ventricle: Mild right ventricular enlargement. Systolic function   is normal.    Left Atrium: Left atrium is severely dilated.    Right Atrium: Right atrium is moderately dilated.    Aortic Valve: The aortic valve is a trileaflet valve. Mildly calcified   cusps. Mildly restricted motion. There is mild stenosis. Aortic valve area   by VTI is 1.5 cm². Aortic valve peak velocity is 2.8 m/s. Mean gradient is   19.7 mmHg. The dimensionless index is 0.49.    Mitral Valve: There is mild regurgitation.    Tricuspid Valve: There is moderate to severe regurgitation.    Pulmonary Artery: The estimated pulmonary artery systolic pressure is   45 mmHg.    IVC/SVC: Elevated venous pressure at 15 mmHg.    Pericardium: Ascites present.      Pt will need outpatient follow up with cards, order placed    Obesity (BMI 30.0-34.9)  BMI 34.46.  Patient would benefit from diet and lifestyle modifications for weight loss.      Elevated alkaline phosphatase level  -patient does have abdominal distention most likely related to gross anasarca, no abdominal tenderness to palpation  -Abdominal U/S 10/13 noted:   1. The liver has a nodular surface and is heterogeneous in appearance.  This is characteristic of hepatic cirrhosis.   2. There is a small amount of ascites.   3. Cholelithiasis.  The common bile duct is dilated. It has a diameter of 7 mm.     -alk phos 209   -Trend on CMP    Abnormal EKG  See discussion for elevated troponin      Elevated troponin  Elevated troponin with abnormal EKG  -patient  denies chest pain does have shortness of breath.  Dyspnea most likely related to volume overload with end-stage renal disease and noncompliance with dialysis  -troponin 0.095, BNP 2973  -chest x-ray negative  -EKG with sinus rhythm with first-degree AV block and questionable inferior/anterior infarct that is age undetermined  -TTE 10/13/24 with normal LV wall motion    -continue aspirin and Coreg  -telemetry monitoring  -cardiology consulted. Followup recs      Thrombocytopenia  The likely etiology of thrombocytopenia is  ESRD . The patients 3 most recent labs are listed below.  Recent Labs     10/13/24  0256 10/13/24  0935 10/14/24  0535   * 104* 113*     Plan  - Will transfuse if platelet count is <50k (if undergoing surgical procedure or have active bleeding).  - platelet count was 124 on 02/01/2024 and prior to that labs were 8 years ago with normal platelets  -check iron studies, thyroid studies, B12, folate, INR, HIV, acute hepatitis panel, lactic acid level, LDH, and haptoglobin  -Trend on CBC      Normocytic anemia  Anemia is likely due to chronic disease due to ESRD. Most recent hemoglobin and hematocrit are listed below.  Recent Labs     10/13/24  0935 10/14/24  0535 10/15/24  0637   HGB 9.7* 9.6* 10.4*   HCT 29.1* 28.9* 32.2*       Plan  - Monitor serial CBC: Daily  - Transfuse PRBC if patient becomes hemodynamically unstable, symptomatic or H/H drops below 7/21.  - Patient has not received any PRBC transfusions to date  -Iron supplementation    H/O: substance abuse  Patient denies any illicit drug use.       Facial rash  -likely uremic pruritus and/or calcinosis cutis in the setting of end-stage renal disease  -calciphylaxis unlikely given appearance of facial rash    -we will trial application of moisturizer and application of mupirocin ointment to open lesions  -wound care consult given open lesions  -p.r.n. Atarax      Volume overload  Volume overload with gross anasarca related to  noncompliance with dialysis  -chest x-ray negative  -BNP 2973  -previous echocardiogram 07/11/2023 with EF greater than 65%  -Results for orders placed during the hospital encounter of 10/12/24  Echo  Interpretation Summary    Left Ventricle: The left ventricle is normal in size. Normal wall thickness. There is concentric hypertrophy. Normal wall motion. There is normal systolic function with a visually estimated ejection fraction of 60 - 65%. Ejection fraction is approximately 65%. There is normal diastolic function.    Right Ventricle: Mild right ventricular enlargement. Systolic function is normal.    Left Atrium: Left atrium is severely dilated.    Right Atrium: Right atrium is moderately dilated.    Aortic Valve: The aortic valve is a trileaflet valve. Mildly calcified cusps. Mildly restricted motion. There is mild stenosis. Aortic valve area by VTI is 1.5 cm². Aortic valve peak velocity is 2.8 m/s. Mean gradient is 19.7 mmHg. The dimensionless index is 0.49.    Mitral Valve: There is mild regurgitation.    Tricuspid Valve: There is moderate to severe regurgitation.    Pulmonary Artery: The estimated pulmonary artery systolic pressure is 45 mmHg.    IVC/SVC: Elevated venous pressure at 15 mmHg.    Pericardium: Ascites present.      PLAN:  -nephrology has been consulted for dialysis        Uremia  See discussion for end-stage renal disease needing dialysis      ESRD needing dialysis  End-stage renal disease on HD TTS, uremia, hyperkalemia, hyperphosphatemia, metabolic acidosis, and with Goodpasture syndrome.  Patient has been noncompliant with dialysis x1 week.  BMP reviewed- noted Estimated Creatinine Clearance: 7.5 mL/min (A) (based on SCr of 12.4 mg/dL (H)). according to latest data. Based on current GFR, CKD stage is end stage.  Monitor UOP and serial BMP and adjust therapy as needed. Renally dose meds. Avoid nephrotoxic medications and procedures.  -patient presents with shortness of breath, volume  overload, gross anasarca, drowsiness, uremic symptoms  -BUN 84, creatinine 16.8, CO2 19, potassium 5.4, magnesium level 2.4, phosphorus 9.1, calcium 8.8, BNP 2973  -ER physician did reach out to Nephrology who does not feel that patient requires urgent hemodialysis  -gave Lasix 80 mg IV x1 dose stat in ED        -O2 supplementation, incentive spirometry, p.r.n. DuoNebs  -continue Velphoro  -Lokelma 10 g p.o. TID  -continuous pulse oximetry and cardiac monitoring  -nephrology consulted, follow-up recs  -Trend renal function panel  - 4 L removed 10/15 dialysis planned 10/15     Hypertension  Patients blood pressure range in the last 24 hours was: BP  Min: 130/70  Max: 197/95.The patient's inpatient anti-hypertensive regimen is listed below:  Current Antihypertensives  amLODIPine tablet 10 mg, Daily, Oral  carvediloL tablet 12.5 mg, 2 times daily, Oral  hydrALAZINE injection 10 mg, Every 6 hours PRN, Intravenous  hydrALAZINE tablet 100 mg, 3 times daily, Oral    Plan  - BP is uncontrolled, will adjust as follows:  Continue home medications as patient reports he has been noncompliant with these, 1st dose of each given while in the emergency department  - add p.r.n. IV hydralazine with parameters      VTE Risk Mitigation (From admission, onward)           Ordered     heparin (porcine) injection 5,000 Units  Every 8 hours         10/12/24 2313     IP VTE HIGH RISK PATIENT  Once         10/12/24 2313     Place sequential compression device  Until discontinued         10/12/24 2313                    Discharge Planning   CARYN: 10/15/2024     Code Status: Full Code   Is the patient medically ready for discharge?:     Reason for patient still in hospital pt trending condition, treatment, Dialysis planned for today   Discharge Plan A: Home                  Jeny Vazquez PA-C  Department of Hospital Medicine   [facility] - Telemetry ([facility])

## 2024-10-15 NOTE — ASSESSMENT & PLAN NOTE
Patients blood pressure range in the last 24 hours was: BP  Min: 130/70  Max: 197/95.The patient's inpatient anti-hypertensive regimen is listed below:  Current Antihypertensives  amLODIPine tablet 10 mg, Daily, Oral  carvediloL tablet 12.5 mg, 2 times daily, Oral  hydrALAZINE injection 10 mg, Every 6 hours PRN, Intravenous  hydrALAZINE tablet 100 mg, 3 times daily, Oral    Plan  - BP is uncontrolled, will adjust as follows:  Continue home medications as patient reports he has been noncompliant with these, 1st dose of each given while in the emergency department  - add p.r.n. IV hydralazine with parameters

## 2024-10-15 NOTE — ASSESSMENT & PLAN NOTE
Echocardiogram with evidence of tricuspid regurgitation that is moderate - severe . The patient's most recent echocardiogram result is listed below. We will manage the valvular abnormality by outpatient f/u with cards . Resume dialysis     Echo    Result Date: 10/13/2024    Left Ventricle: The left ventricle is normal in size. Normal wall   thickness. There is concentric hypertrophy. Normal wall motion. There is   normal systolic function with a visually estimated ejection fraction of 60   - 65%. Ejection fraction is approximately 65%. There is normal diastolic   function.    Right Ventricle: Mild right ventricular enlargement. Systolic function   is normal.    Left Atrium: Left atrium is severely dilated.    Right Atrium: Right atrium is moderately dilated.    Aortic Valve: The aortic valve is a trileaflet valve. Mildly calcified   cusps. Mildly restricted motion. There is mild stenosis. Aortic valve area   by VTI is 1.5 cm². Aortic valve peak velocity is 2.8 m/s. Mean gradient is   19.7 mmHg. The dimensionless index is 0.49.    Mitral Valve: There is mild regurgitation.    Tricuspid Valve: There is moderate to severe regurgitation.    Pulmonary Artery: The estimated pulmonary artery systolic pressure is   45 mmHg.    IVC/SVC: Elevated venous pressure at 15 mmHg.    Pericardium: Ascites present.      Pt will need outpatient follow up with cards, order placed   (0) indicator not present

## 2024-10-15 NOTE — ASSESSMENT & PLAN NOTE
End-stage renal disease on HD TTS, uremia, hyperkalemia, hyperphosphatemia, metabolic acidosis, and with Goodpasture syndrome.  Patient has been noncompliant with dialysis x1 week.  BMP reviewed- noted Estimated Creatinine Clearance: 7.5 mL/min (A) (based on SCr of 12.4 mg/dL (H)). according to latest data. Based on current GFR, CKD stage is end stage.  Monitor UOP and serial BMP and adjust therapy as needed. Renally dose meds. Avoid nephrotoxic medications and procedures.  -patient presents with shortness of breath, volume overload, gross anasarca, drowsiness, uremic symptoms  -BUN 84, creatinine 16.8, CO2 19, potassium 5.4, magnesium level 2.4, phosphorus 9.1, calcium 8.8, BNP 2973  -ER physician did reach out to Nephrology who does not feel that patient requires urgent hemodialysis  -gave Lasix 80 mg IV x1 dose stat in ED        -O2 supplementation, incentive spirometry, p.r.n. Steve  -continue Velphoro  -Lokelma 10 g p.o. TID  -continuous pulse oximetry and cardiac monitoring  -nephrology consulted, follow-up recs  -Trend renal function panel  - 4 L removed 10/15 dialysis planned 10/15

## 2024-10-15 NOTE — PROGRESS NOTES
10/15/24 9729   Post-Hemodialysis Assessment   Blood Volume Processed (Liters) 72 L   Dialyzer Clearance Moderately streaked   Duration of Treatment 180 minutes   Additional Fluid Intake (mL) 180 mL   Total UF (mL) 3000 mL   Patient Response to Treatment Pt tolerated tx well. Hemostasis achieved

## 2024-10-15 NOTE — PLAN OF CARE
Problem: Adult Inpatient Plan of Care  Goal: Plan of Care Review  Outcome: Progressing  Goal: Patient-Specific Goal (Individualized)  Outcome: Progressing  Goal: Absence of Hospital-Acquired Illness or Injury  Outcome: Progressing  Goal: Optimal Comfort and Wellbeing  Outcome: Progressing  Goal: Readiness for Transition of Care  Outcome: Progressing     Problem: Hemodialysis  Goal: Safe, Effective Therapy Delivery  Outcome: Progressing  Goal: Effective Tissue Perfusion  Outcome: Progressing  Goal: Absence of Infection Signs and Symptoms  Outcome: Progressing     Problem: Fall Injury Risk  Goal: Absence of Fall and Fall-Related Injury  Outcome: Progressing     Problem: Skin Injury Risk Increased  Goal: Skin Health and Integrity  Outcome: Progressing     Problem: Wound  Goal: Optimal Coping  Outcome: Progressing  Goal: Optimal Functional Ability  Outcome: Progressing  Goal: Absence of Infection Signs and Symptoms  Outcome: Progressing  Goal: Improved Oral Intake  Outcome: Progressing  Goal: Optimal Pain Control and Function  Outcome: Progressing  Goal: Skin Health and Integrity  Outcome: Progressing  Goal: Optimal Wound Healing  Outcome: Progressing

## 2024-10-15 NOTE — PROGRESS NOTES
Nephrology Progress Note:    HPI:   Mr. Guzman is a 59-year-old male with a hx of ESRD ([facility]) who presented to the emergency room with shortness of breath and lower extremity edema and Nephrology has been asked to evaluate.     Patient's story is that he was last seen on dialysis on 10/02/2024, by our partner, Dr. Valentin.  His last dialysis session was on 10/05/2024.  According to that note, patient is homeless.  His medical history is pertinent for Goodpasture syndrome, substance abuse, GERD.    Interval History:   Chart reviewed/patient examined during his hemodialysis session.  He is tolerating the session without any complaints or cardiovascular instability.  He denies nausea, vomiting, or shortness of breath.    Health Status   Allergies:    has No Known Allergies.    Current medications:     Current Facility-Administered Medications:     acetaminophen tablet 650 mg, 650 mg, Oral, Q6H PRN, Marcella Zheng MD    albuterol-ipratropium 2.5 mg-0.5 mg/3 mL nebulizer solution 3 mL, 3 mL, Nebulization, Q6H PRN, Marcella Zheng MD, 3 mL at 10/13/24 2303    amLODIPine tablet 10 mg, 10 mg, Oral, Daily, Marcella Zheng MD, 10 mg at 10/15/24 0811    aspirin EC tablet 81 mg, 81 mg, Oral, Daily, Marcella Zheng MD, 81 mg at 10/15/24 0811    [COMPLETED] calcium gluconate 1 g in D5W 50 mL IVPB (MB+), 1 g, Intravenous, Once, Stopped at 10/13/24 1526 **AND** calcium gluconate 1 g in D5W 50 mL IVPB (MB+), 1 g, Intravenous, Q10 Min PRN, Eduardo Obrien DO    carvediloL tablet 12.5 mg, 12.5 mg, Oral, BID, Marcella Zheng MD, 12.5 mg at 10/15/24 0811    dextrose 10% bolus 125 mL 125 mL, 12.5 g, Intravenous, PRN, Marcella Zheng MD    dextrose 10% bolus 250 mL 250 mL, 25 g, Intravenous, PRN, Marcella Zheng MD    ferrous sulfate tablet 1 each, 1 tablet, Oral, Daily, Eduardo Obrien, , 1 each at 10/15/24 0811    glucagon (human recombinant) injection 1 mg, 1 mg, Intramuscular, PRN,  "Marcella Zheng MD    glucose chewable tablet 16 g, 16 g, Oral, PRN, Marcella Zheng MD    glucose chewable tablet 24 g, 24 g, Oral, PRN, Marcella Zheng MD    heparin (porcine) injection 5,000 Units, 5,000 Units, Subcutaneous, Q8H, Marcella Zheng MD, 5,000 Units at 10/15/24 0513    hydrALAZINE injection 10 mg, 10 mg, Intravenous, Q6H PRN, Marcella Zheng MD, 10 mg at 10/14/24 0544    hydrALAZINE tablet 100 mg, 100 mg, Oral, TID, Marcella Zheng MD, 100 mg at 10/15/24 0811    hydrOXYzine HCL tablet 25 mg, 25 mg, Oral, TID PRN, Marcella Zheng MD, 25 mg at 10/14/24 2301    mupirocin 2 % ointment, , Nasal, BID, Jimbo Veras MD, Given at 10/15/24 0804    mupirocin 2 % ointment, , Topical (Top), BID, Micah, Eduardo Lex, DO, Given at 10/15/24 0804    naloxone 0.4 mg/mL injection 0.02 mg, 0.02 mg, Intravenous, PRN, Marcella Zheng MD    ondansetron injection 4 mg, 4 mg, Intravenous, Q6H PRN, Marcella Zheng MD, 4 mg at 10/13/24 1548    sodium chloride 0.9% bolus 250 mL 250 mL, 250 mL, Intravenous, PRN, Jimbo Veras MD    sodium chloride 0.9% flush 10 mL, 10 mL, Intravenous, Q12H PRN, Marcella Zheng MD    sucroferric oxyhydroxide Chew 1,000 mg, 2 tablet, Oral, TID WM, Marcella Zheng MD, 1,000 mg at 10/15/24 1140    tamsulosin 24 hr capsule 0.4 mg, 0.4 mg, Oral, Daily, Marcella Zheng MD, 0.4 mg at 10/15/24 0811         Objective       Physical Examination:   VS/Measurements   BP (!) 170/96 (BP Location: Right arm, Patient Position: Sitting)   Pulse 60   Temp 97.7 °F (36.5 °C) (Oral)   Resp (!) 24   Ht 5' 7" (1.702 m)   Wt 101.3 kg (223 lb 5.2 oz)   SpO2 95%   BMI 34.98 kg/m²   Vitals:    10/15/24 1115   BP: (!) 170/96   Pulse:    Resp:    Temp:      Constitutional:  Awake, No acute distress.   HEENT:  Normocephalic and atraumatic, No scleral icterus.   Neck:  Supple. No tracheal deviation present.   Cardiovascular:  Regular rhythm.  Exam reveals no gallop and no friction rub.  "   Pulmonary/Chest:  Breath sounds normal. No rales.   Abdominal:  Soft, Bowel sounds are normal. There is no tenderness.   Musculoskeletal:  Exhibits no contracture or deformity.   Extremity:  No clubbing/cyanosis, Edema = present.  LUE based bar-space bar thrill/bruit present  Neurological:  Alert and oriented to person, place, and time.   Skin:  Warm and dry.   Psychiatric:  Normal mood and affect.   Vitals reviewed.    Laboratory Results   Today's Lab Results :    Recent Results (from the past 24 hours)   Basic metabolic panel    Collection Time: 10/14/24  3:12 PM   Result Value Ref Range    Sodium 136 136 - 145 mmol/L    Potassium 3.9 3.5 - 5.1 mmol/L    Chloride 95 95 - 110 mmol/L    CO2 22 (L) 23 - 29 mmol/L    Glucose 109 70 - 110 mg/dL    BUN 47 (H) 6 - 20 mg/dL    Creatinine 11.4 (H) 0.5 - 1.4 mg/dL    Calcium 9.0 8.7 - 10.5 mg/dL    Anion Gap 19 (H) 8 - 16 mmol/L    eGFR 5 (A) >60 mL/min/1.73 m^2   POCT glucose    Collection Time: 10/14/24  4:36 PM   Result Value Ref Range    POCT Glucose 106 70 - 110 mg/dL   Basic metabolic panel    Collection Time: 10/14/24  6:21 PM   Result Value Ref Range    Sodium 134 (L) 136 - 145 mmol/L    Potassium 4.0 3.5 - 5.1 mmol/L    Chloride 92 (L) 95 - 110 mmol/L    CO2 24 23 - 29 mmol/L    Glucose 111 (H) 70 - 110 mg/dL    BUN 49 (H) 6 - 20 mg/dL    Creatinine 12.2 (H) 0.5 - 1.4 mg/dL    Calcium 8.8 8.7 - 10.5 mg/dL    Anion Gap 18 (H) 8 - 16 mmol/L    eGFR 4 (A) >60 mL/min/1.73 m^2   POCT glucose    Collection Time: 10/14/24  8:20 PM   Result Value Ref Range    POCT Glucose 95 70 - 110 mg/dL   Basic metabolic panel    Collection Time: 10/15/24 12:16 AM   Result Value Ref Range    Sodium 135 (L) 136 - 145 mmol/L    Potassium 4.3 3.5 - 5.1 mmol/L    Chloride 94 (L) 95 - 110 mmol/L    CO2 23 23 - 29 mmol/L    Glucose 104 70 - 110 mg/dL    BUN 52 (H) 6 - 20 mg/dL    Creatinine 12.5 (H) 0.5 - 1.4 mg/dL    Calcium 8.7 8.7 - 10.5 mg/dL    Anion Gap 18 (H) 8 - 16 mmol/L    eGFR 4  (A) >60 mL/min/1.73 m^2   POCT glucose    Collection Time: 10/15/24  5:12 AM   Result Value Ref Range    POCT Glucose 89 70 - 110 mg/dL   Phosphorus    Collection Time: 10/15/24  6:37 AM   Result Value Ref Range    Phosphorus 7.5 (H) 2.7 - 4.5 mg/dL   Magnesium    Collection Time: 10/15/24  6:37 AM   Result Value Ref Range    Magnesium 2.3 1.6 - 2.6 mg/dL   CBC Auto Differential    Collection Time: 10/15/24  6:37 AM   Result Value Ref Range    WBC 6.01 3.90 - 12.70 K/uL    RBC 3.29 (L) 4.60 - 6.20 M/uL    Hemoglobin 10.4 (L) 14.0 - 18.0 g/dL    Hematocrit 32.2 (L) 40.0 - 54.0 %    MCV 98 82 - 98 fL    MCH 31.6 (H) 27.0 - 31.0 pg    MCHC 32.3 32.0 - 36.0 g/dL    RDW 17.1 (H) 11.5 - 14.5 %    Platelets 122 (L) 150 - 450 K/uL    MPV 10.9 9.2 - 12.9 fL    Immature Granulocytes 0.7 (H) 0.0 - 0.5 %    Gran # (ANC) 3.6 1.8 - 7.7 K/uL    Immature Grans (Abs) 0.04 0.00 - 0.04 K/uL    Lymph # 0.7 (L) 1.0 - 4.8 K/uL    Mono # 1.2 (H) 0.3 - 1.0 K/uL    Eos # 0.5 0.0 - 0.5 K/uL    Baso # 0.04 0.00 - 0.20 K/uL    nRBC 0 0 /100 WBC    Gran % 60.2 38.0 - 73.0 %    Lymph % 11.5 (L) 18.0 - 48.0 %    Mono % 19.1 (H) 4.0 - 15.0 %    Eosinophil % 7.8 0.0 - 8.0 %    Basophil % 0.7 0.0 - 1.9 %    Differential Method Automated    Basic metabolic panel    Collection Time: 10/15/24  6:37 AM   Result Value Ref Range    Sodium 137 136 - 145 mmol/L    Potassium 4.5 3.5 - 5.1 mmol/L    Chloride 95 95 - 110 mmol/L    CO2 23 23 - 29 mmol/L    Glucose 82 70 - 110 mg/dL    BUN 55 (H) 6 - 20 mg/dL    Creatinine 12.4 (H) 0.5 - 1.4 mg/dL    Calcium 8.8 8.7 - 10.5 mg/dL    Anion Gap 19 (H) 8 - 16 mmol/L    eGFR 4 (A) >60 mL/min/1.73 m^2   Hepatic function panel    Collection Time: 10/15/24  6:37 AM   Result Value Ref Range    Total Protein 7.7 6.0 - 8.4 g/dL    Albumin 3.6 3.5 - 5.2 g/dL    Total Bilirubin 0.9 0.1 - 1.0 mg/dL    Bilirubin, Direct 0.4 (H) 0.1 - 0.3 mg/dL    AST 51 (H) 10 - 40 U/L    ALT 34 10 - 44 U/L    Alkaline Phosphatase 209 (H) 55 -  135 U/L   POCT glucose    Collection Time: 10/15/24 11:21 AM   Result Value Ref Range    POCT Glucose 94 70 - 110 mg/dL       @[facility]@ @[facility]@    Assessment & Plan   Active Hospital Problems    Diagnosis  POA    *Acute hypoxemic respiratory failure [J96.01]  No    Tricuspid regurgitation [I07.1]  Yes    Hepatitis C antibody positive in blood [R76.8]  Yes    ESRD needing dialysis [N18.6, Z99.2]  Yes    Uremia [N19]  Yes    Volume overload [E87.70]  Yes    Facial rash [R21]  Yes    H/O: substance abuse [F19.11]  Yes    Normocytic anemia [D64.9]  Yes    Thrombocytopenia [D69.6]  Yes    Elevated troponin [R79.89]  Yes    Abnormal EKG [R94.31]  Yes    Elevated alkaline phosphatase level [R74.8]  Yes    Obesity (BMI 30.0-34.9) [E66.811]  Yes    Hypertension [I10]  Yes     Chronic      Resolved Hospital Problems   No resolved problems to display.       Assessment/Recommendations:      [facility] ([facility]  Hemodialysis today and follow laboratory studies.    Mild hyperkalemia.  Resolved, follow laboratory studies.     Anemia of chronic kidney disease--received Mircera 225 mcg on 10/01/2024.  Iron saturation 23%.  No indication for SEAN therapy, follow H&H     Hyperparathyroidism of renal origin with severe hyperphosphatemia due to noncompliance.  Phosphate binders restarted, phosphorus improved, follow laboratory studies.     Hypertension with CKD-home medicines have been restarted.     History of Goodpasture's.        -Portions of this note were created using voice recognition software.  It is possible that there are errors, which have persisted, despite proofreading.  If there is a question regarding contents of this document, please contact me for clarification.     No

## 2024-10-15 NOTE — ASSESSMENT & PLAN NOTE
Anemia is likely due to chronic disease due to ESRD. Most recent hemoglobin and hematocrit are listed below.  Recent Labs     10/13/24  0935 10/14/24  0535 10/15/24  0637   HGB 9.7* 9.6* 10.4*   HCT 29.1* 28.9* 32.2*       Plan  - Monitor serial CBC: Daily  - Transfuse PRBC if patient becomes hemodynamically unstable, symptomatic or H/H drops below 7/21.  - Patient has not received any PRBC transfusions to date  -Iron supplementation

## 2024-10-15 NOTE — SUBJECTIVE & OBJECTIVE
Interval History:  Patient is resting in bed lying on left side.  Very fatigued today, sleeping throughout interview.  Patient is arousable.  Without complaints.  Patient had 4 L removed with dialysis yesterday and plans for another dialysis session today    Review of Systems   Constitutional:  Positive for activity change. Negative for chills and fever.   HENT:  Negative for congestion and postnasal drip.    Eyes:  Negative for pain, discharge and itching.   Respiratory:  Negative for cough and choking.    Cardiovascular:  Positive for leg swelling.   Gastrointestinal:  Negative for abdominal pain.   Musculoskeletal:  Negative for arthralgias.   Skin:  Positive for rash and wound.   Hematological:  Negative for adenopathy.     Objective:     Vital Signs (Most Recent):  Temp: 97.4 °F (36.3 °C) (10/15/24 0820)  Pulse: (!) 56 (10/15/24 0830)  Resp: (!) 22 (10/15/24 0820)  BP: (!) 157/90 (10/15/24 0820)  SpO2: (!) 92 % (10/15/24 0820) Vital Signs (24h Range):  Temp:  [97.4 °F (36.3 °C)-98.4 °F (36.9 °C)] 97.4 °F (36.3 °C)  Pulse:  [56-68] 56  Resp:  [18-22] 22  SpO2:  [90 %-96 %] 92 %  BP: (150-175)/() 157/90     Weight: 101.3 kg (223 lb 5.2 oz)  Body mass index is 34.98 kg/m².    Intake/Output Summary (Last 24 hours) at 10/15/2024 1047  Last data filed at 10/15/2024 0450  Gross per 24 hour   Intake 480 ml   Output 4500 ml   Net -4020 ml         Physical Exam  Constitutional:       General: He is not in acute distress.     Appearance: He is obese. He is ill-appearing. He is not diaphoretic.   HENT:      Head: Normocephalic and atraumatic.      Comments: Periorbital edema to left eye     Nose: Nose normal.      Mouth/Throat:      Mouth: Mucous membranes are moist.   Eyes:      Extraocular Movements: Extraocular movements intact.   Cardiovascular:      Rate and Rhythm: Normal rate and regular rhythm.      Heart sounds: Murmur heard.   Pulmonary:      Effort: No respiratory distress.      Breath sounds: No stridor.  Rhonchi (Sparse) present.      Comments: On room air currently  No shortness of breath  Abdominal:      General: Bowel sounds are normal. There is no distension.      Palpations: Abdomen is soft.   Genitourinary:     Comments: Deferred  Musculoskeletal:      Cervical back: Normal range of motion. No rigidity.      Right lower leg: Edema present.      Left lower leg: Edema present.      Comments: Bilateral lower extremity edema 1+ pitting   Skin:     General: Skin is warm and dry.      Findings: Rash (Erythematous patches with bleeding on chest and face) present.   Neurological:      Motor: Weakness present.   Psychiatric:      Comments: Patient is fatigued throughout interview.  Quickly falls back to sleep             Significant Labs: All pertinent labs within the past 24 hours have been reviewed.  Recent Lab Results  (Last 5 results in the past 24 hours)        10/15/24  0637   10/15/24  0512   10/15/24  0016   10/14/24  2020   10/14/24  1821        Albumin 3.6                              ALT 34               Anion Gap 19     18     18       AST 51               Baso # 0.04               Basophil % 0.7               Bilirubin Direct 0.4               BILIRUBIN TOTAL 0.9  Comment: For infants and newborns, interpretation of results should be based  on gestational age, weight and in agreement with clinical  observations.    Premature Infant recommended reference ranges:  Up to 24 hours.............<8.0 mg/dL  Up to 48 hours............<12.0 mg/dL  3-5 days..................<15.0 mg/dL  6-29 days.................<15.0 mg/dL                 BUN 55     52     49       Calcium 8.8     8.7     8.8       Chloride 95     94     92       CO2 23     23     24       Creatinine 12.4     12.5     12.2       Differential Method Automated               eGFR 4     4     4       Eos # 0.5               Eos % 7.8               Glucose 82     104     111       Gran # (ANC) 3.6               Gran % 60.2                Hematocrit 32.2               Hemoglobin 10.4               Immature Grans (Abs) 0.04  Comment: Mild elevation in immature granulocytes is non specific and   can be seen in a variety of conditions including stress response,   acute inflammation, trauma and pregnancy. Correlation with other   laboratory and clinical findings is essential.                 Immature Granulocytes 0.7               Lymph # 0.7               Lymph % 11.5               Magnesium  2.3               MCH 31.6               MCHC 32.3               MCV 98               Mono # 1.2               Mono % 19.1               MPV 10.9               nRBC 0               Phosphorus Level 7.5               Platelet Count 122               POCT Glucose   89     95         Potassium 4.5     4.3     4.0       PROTEIN TOTAL 7.7               RBC 3.29               RDW 17.1               Sodium 137     135     134       WBC 6.01                                      Significant Imaging: I have reviewed all pertinent imaging results/findings within the past 24 hours.

## 2024-10-15 NOTE — PROGRESS NOTES
10/15/24 7101   Post-Hemodialysis Assessment   Blood Volume Processed (Liters) 72 L   Dialyzer Clearance Moderately streaked   Duration of Treatment 180 minutes   Total UF (mL) 3000 mL   Patient Response to Treatment Pt tolerated tx well. Hemostasis achieved

## 2024-10-15 NOTE — DISCHARGE INSTRUCTIONS
-You were admitted to our hospital for treatment of missed dialysis sessions. Over the course of your hospitalization, our Nephrology team also evaluated you.    -Please read the attached information regarding ESRD and go to your nearest ED if any of the alarm/concerning signs develop.    -We have placed a referral to Hepatology due to your abnomal lab enzymes and Hepatitis C concerns.  Please give our scheduling line a call at [phone] to schedule an appointment with them if it has not been setup already.    -Followup with your primary care physician within 1 week for updated labwork, medication adjustments and any routine post-discharge care.

## 2024-10-23 ENCOUNTER — HOSPITAL ENCOUNTER (INPATIENT)
Facility: HOSPITAL | Age: 59
LOS: 6 days | Discharge: LEFT BEFORE DISCHARGE | DRG: 377 | End: 2024-10-29
Attending: EMERGENCY MEDICINE | Admitting: INTERNAL MEDICINE
Payer: MEDICAID

## 2024-10-23 DIAGNOSIS — Z99.2 ESRD (END STAGE RENAL DISEASE) ON DIALYSIS: Chronic | ICD-10-CM

## 2024-10-23 DIAGNOSIS — K74.69 DECOMPENSATED HCV CIRRHOSIS: ICD-10-CM

## 2024-10-23 DIAGNOSIS — K21.00 GASTROESOPHAGEAL REFLUX DISEASE WITH ESOPHAGITIS WITHOUT HEMORRHAGE: Chronic | ICD-10-CM

## 2024-10-23 DIAGNOSIS — D69.6 THROMBOCYTOPENIA: ICD-10-CM

## 2024-10-23 DIAGNOSIS — K92.2 GASTROINTESTINAL HEMORRHAGE, UNSPECIFIED GASTROINTESTINAL HEMORRHAGE TYPE: Primary | ICD-10-CM

## 2024-10-23 DIAGNOSIS — D62 ACUTE BLOOD LOSS ANEMIA: ICD-10-CM

## 2024-10-23 DIAGNOSIS — N18.6 ESRD (END STAGE RENAL DISEASE) ON DIALYSIS: Chronic | ICD-10-CM

## 2024-10-23 DIAGNOSIS — K92.0 HEMATEMESIS WITH NAUSEA: ICD-10-CM

## 2024-10-23 DIAGNOSIS — B19.20 DECOMPENSATED HCV CIRRHOSIS: ICD-10-CM

## 2024-10-23 DIAGNOSIS — K92.2 GI BLEED: ICD-10-CM

## 2024-10-23 PROBLEM — N30.00 ACUTE CYSTITIS: Status: ACTIVE | Noted: 2024-10-23

## 2024-10-23 LAB
ABO + RH BLD: NORMAL
ALBUMIN SERPL BCP-MCNC: 2.2 G/DL (ref 3.5–5.2)
ALP SERPL-CCNC: 140 U/L (ref 40–150)
ALT SERPL W/O P-5'-P-CCNC: 36 U/L (ref 10–44)
AMPHET+METHAMPHET UR QL: NEGATIVE
ANION GAP SERPL CALC-SCNC: 15 MMOL/L (ref 8–16)
ANISOCYTOSIS BLD QL SMEAR: SLIGHT
ANISOCYTOSIS BLD QL SMEAR: SLIGHT
APTT PPP: 50.5 SEC (ref 21–32)
AST SERPL-CCNC: 54 U/L (ref 10–40)
BACTERIA #/AREA URNS HPF: ABNORMAL /HPF
BARBITURATES UR QL SCN>200 NG/ML: NEGATIVE
BASOPHILS # BLD AUTO: 0.02 K/UL (ref 0–0.2)
BASOPHILS # BLD AUTO: 0.02 K/UL (ref 0–0.2)
BASOPHILS NFR BLD: 0.1 % (ref 0–1.9)
BASOPHILS NFR BLD: 0.1 % (ref 0–1.9)
BENZODIAZ UR QL SCN>200 NG/ML: NEGATIVE
BILIRUB SERPL-MCNC: 0.9 MG/DL (ref 0.1–1)
BILIRUB UR QL STRIP: NEGATIVE
BLD GP AB SCN CELLS X3 SERPL QL: NORMAL
BLD PROD TYP BPU: NORMAL
BLOOD UNIT EXPIRATION DATE: NORMAL
BLOOD UNIT TYPE CODE: 5100
BLOOD UNIT TYPE: NORMAL
BUN SERPL-MCNC: 59 MG/DL (ref 6–20)
BZE UR QL SCN: NEGATIVE
CALCIUM SERPL-MCNC: 7.6 MG/DL (ref 8.7–10.5)
CANNABINOIDS UR QL SCN: NEGATIVE
CHLORIDE SERPL-SCNC: 99 MMOL/L (ref 95–110)
CLARITY UR: ABNORMAL
CO2 SERPL-SCNC: 24 MMOL/L (ref 23–29)
CODING SYSTEM: NORMAL
COLOR UR: ABNORMAL
CREAT SERPL-MCNC: 11.5 MG/DL (ref 0.5–1.4)
CREAT UR-MCNC: 11.2 MG/DL (ref 23–375)
CROSSMATCH INTERPRETATION: NORMAL
DACRYOCYTES BLD QL SMEAR: ABNORMAL
DACRYOCYTES BLD QL SMEAR: ABNORMAL
DIFFERENTIAL METHOD BLD: ABNORMAL
DIFFERENTIAL METHOD BLD: ABNORMAL
DISPENSE STATUS: NORMAL
EOSINOPHIL # BLD AUTO: 0 K/UL (ref 0–0.5)
EOSINOPHIL # BLD AUTO: 0.1 K/UL (ref 0–0.5)
EOSINOPHIL NFR BLD: 0.1 % (ref 0–8)
EOSINOPHIL NFR BLD: 0.6 % (ref 0–8)
ERYTHROCYTE [DISTWIDTH] IN BLOOD BY AUTOMATED COUNT: 15.8 % (ref 11.5–14.5)
ERYTHROCYTE [DISTWIDTH] IN BLOOD BY AUTOMATED COUNT: 17.3 % (ref 11.5–14.5)
EST. GFR  (NO RACE VARIABLE): 5 ML/MIN/1.73 M^2
ETHANOL SERPL-MCNC: <10 MG/DL
GLUCOSE SERPL-MCNC: 84 MG/DL (ref 70–110)
GLUCOSE UR QL STRIP: NEGATIVE
HCT VFR BLD AUTO: 15.9 % (ref 40–54)
HCT VFR BLD AUTO: 25.5 % (ref 40–54)
HGB BLD-MCNC: 5.3 G/DL (ref 14–18)
HGB BLD-MCNC: 8.7 G/DL (ref 14–18)
HGB UR QL STRIP: ABNORMAL
HYALINE CASTS #/AREA URNS LPF: 62 /LPF
IMM GRANULOCYTES # BLD AUTO: 0.23 K/UL (ref 0–0.04)
IMM GRANULOCYTES # BLD AUTO: 0.28 K/UL (ref 0–0.04)
IMM GRANULOCYTES NFR BLD AUTO: 1.6 % (ref 0–0.5)
IMM GRANULOCYTES NFR BLD AUTO: 1.8 % (ref 0–0.5)
INR PPP: 1.4 (ref 0.8–1.2)
INR PPP: 1.6 (ref 0.8–1.2)
KETONES UR QL STRIP: NEGATIVE
LEUKOCYTE ESTERASE UR QL STRIP: ABNORMAL
LYMPHOCYTES # BLD AUTO: 0.8 K/UL (ref 1–4.8)
LYMPHOCYTES # BLD AUTO: 0.8 K/UL (ref 1–4.8)
LYMPHOCYTES NFR BLD: 5.1 % (ref 18–48)
LYMPHOCYTES NFR BLD: 5.7 % (ref 18–48)
MAGNESIUM SERPL-MCNC: 1.8 MG/DL (ref 1.6–2.6)
MCH RBC QN AUTO: 31.1 PG (ref 27–31)
MCH RBC QN AUTO: 32.1 PG (ref 27–31)
MCHC RBC AUTO-ENTMCNC: 33.3 G/DL (ref 32–36)
MCHC RBC AUTO-ENTMCNC: 34.1 G/DL (ref 32–36)
MCV RBC AUTO: 91 FL (ref 82–98)
MCV RBC AUTO: 96 FL (ref 82–98)
METHADONE UR QL SCN>300 NG/ML: NEGATIVE
MICROSCOPIC COMMENT: ABNORMAL
MONOCYTES # BLD AUTO: 1.9 K/UL (ref 0.3–1)
MONOCYTES # BLD AUTO: 2.3 K/UL (ref 0.3–1)
MONOCYTES NFR BLD: 13.9 % (ref 4–15)
MONOCYTES NFR BLD: 14.4 % (ref 4–15)
NEUTROPHILS # BLD AUTO: 10.9 K/UL (ref 1.8–7.7)
NEUTROPHILS # BLD AUTO: 12.5 K/UL (ref 1.8–7.7)
NEUTROPHILS NFR BLD: 78.1 % (ref 38–73)
NEUTROPHILS NFR BLD: 78.5 % (ref 38–73)
NITRITE UR QL STRIP: NEGATIVE
NRBC BLD-RTO: 0 /100 WBC
NRBC BLD-RTO: 0 /100 WBC
NUM UNITS TRANS FFP: NORMAL
NUM UNITS TRANS PACKED RBC: NORMAL
NUM UNITS TRANS PACKED RBC: NORMAL
OPIATES UR QL SCN: NEGATIVE
OVALOCYTES BLD QL SMEAR: ABNORMAL
OVALOCYTES BLD QL SMEAR: ABNORMAL
PCP UR QL SCN>25 NG/ML: NEGATIVE
PH UR STRIP: 8 [PH] (ref 5–8)
PHOSPHATE SERPL-MCNC: 4.8 MG/DL (ref 2.7–4.5)
PLATELET # BLD AUTO: 94 K/UL (ref 150–450)
PLATELET # BLD AUTO: 99 K/UL (ref 150–450)
PLATELET BLD QL SMEAR: ABNORMAL
PLATELET BLD QL SMEAR: ABNORMAL
PMV BLD AUTO: 11.4 FL (ref 9.2–12.9)
PMV BLD AUTO: 11.9 FL (ref 9.2–12.9)
POCT GLUCOSE: 107 MG/DL (ref 70–110)
POCT GLUCOSE: 114 MG/DL (ref 70–110)
POCT GLUCOSE: 63 MG/DL (ref 70–110)
POTASSIUM SERPL-SCNC: 4.6 MMOL/L (ref 3.5–5.1)
PROT SERPL-MCNC: 5 G/DL (ref 6–8.4)
PROT UR QL STRIP: ABNORMAL
PROTHROMBIN TIME: 14.9 SEC (ref 9–12.5)
PROTHROMBIN TIME: 17.9 SEC (ref 9–12.5)
RBC # BLD AUTO: 1.65 M/UL (ref 4.6–6.2)
RBC # BLD AUTO: 2.8 M/UL (ref 4.6–6.2)
RBC #/AREA URNS HPF: 16 /HPF (ref 0–4)
SODIUM SERPL-SCNC: 138 MMOL/L (ref 136–145)
SP GR UR STRIP: >1.03 (ref 1–1.03)
SPECIMEN OUTDATE: NORMAL
TOXICOLOGY INFORMATION: ABNORMAL
UNIDENT CRYS URNS QL MICRO: ABNORMAL
URN SPEC COLLECT METH UR: ABNORMAL
UROBILINOGEN UR STRIP-ACNC: NEGATIVE EU/DL
WBC # BLD AUTO: 13.97 K/UL (ref 3.9–12.7)
WBC # BLD AUTO: 15.85 K/UL (ref 3.9–12.7)
WBC #/AREA URNS HPF: >100 /HPF (ref 0–5)
WBC CLUMPS URNS QL MICRO: ABNORMAL

## 2024-10-23 PROCEDURE — 87186 SC STD MICRODIL/AGAR DIL: CPT | Performed by: EMERGENCY MEDICINE

## 2024-10-23 PROCEDURE — 25000003 PHARM REV CODE 250: Performed by: INTERNAL MEDICINE

## 2024-10-23 PROCEDURE — 94761 N-INVAS EAR/PLS OXIMETRY MLT: CPT

## 2024-10-23 PROCEDURE — 99900035 HC TECH TIME PER 15 MIN (STAT)

## 2024-10-23 PROCEDURE — 25500020 PHARM REV CODE 255: Performed by: EMERGENCY MEDICINE

## 2024-10-23 PROCEDURE — 93005 ELECTROCARDIOGRAM TRACING: CPT

## 2024-10-23 PROCEDURE — 86920 COMPATIBILITY TEST SPIN: CPT | Performed by: EMERGENCY MEDICINE

## 2024-10-23 PROCEDURE — 83735 ASSAY OF MAGNESIUM: CPT | Performed by: EMERGENCY MEDICINE

## 2024-10-23 PROCEDURE — 5A1945Z RESPIRATORY VENTILATION, 24-96 CONSECUTIVE HOURS: ICD-10-PCS | Performed by: STUDENT IN AN ORGANIZED HEALTH CARE EDUCATION/TRAINING PROGRAM

## 2024-10-23 PROCEDURE — 84100 ASSAY OF PHOSPHORUS: CPT | Performed by: EMERGENCY MEDICINE

## 2024-10-23 PROCEDURE — 85025 COMPLETE CBC W/AUTO DIFF WBC: CPT | Mod: 91 | Performed by: INTERNAL MEDICINE

## 2024-10-23 PROCEDURE — 93010 ELECTROCARDIOGRAM REPORT: CPT | Mod: ,,, | Performed by: INTERNAL MEDICINE

## 2024-10-23 PROCEDURE — 85730 THROMBOPLASTIN TIME PARTIAL: CPT | Performed by: EMERGENCY MEDICINE

## 2024-10-23 PROCEDURE — 0BH17EZ INSERTION OF ENDOTRACHEAL AIRWAY INTO TRACHEA, VIA NATURAL OR ARTIFICIAL OPENING: ICD-10-PCS | Performed by: INTERNAL MEDICINE

## 2024-10-23 PROCEDURE — 27100171 HC OXYGEN HIGH FLOW UP TO 24 HOURS

## 2024-10-23 PROCEDURE — 80053 COMPREHEN METABOLIC PANEL: CPT | Performed by: EMERGENCY MEDICINE

## 2024-10-23 PROCEDURE — 82077 ASSAY SPEC XCP UR&BREATH IA: CPT | Performed by: EMERGENCY MEDICINE

## 2024-10-23 PROCEDURE — 86901 BLOOD TYPING SEROLOGIC RH(D): CPT | Performed by: EMERGENCY MEDICINE

## 2024-10-23 PROCEDURE — 87086 URINE CULTURE/COLONY COUNT: CPT | Performed by: EMERGENCY MEDICINE

## 2024-10-23 PROCEDURE — 99285 EMERGENCY DEPT VISIT HI MDM: CPT | Mod: 25

## 2024-10-23 PROCEDURE — 27100108

## 2024-10-23 PROCEDURE — 20000000 HC ICU ROOM

## 2024-10-23 PROCEDURE — 51702 INSERT TEMP BLADDER CATH: CPT

## 2024-10-23 PROCEDURE — 5A1D70Z PERFORMANCE OF URINARY FILTRATION, INTERMITTENT, LESS THAN 6 HOURS PER DAY: ICD-10-PCS | Performed by: STUDENT IN AN ORGANIZED HEALTH CARE EDUCATION/TRAINING PROGRAM

## 2024-10-23 PROCEDURE — 36430 TRANSFUSION BLD/BLD COMPNT: CPT

## 2024-10-23 PROCEDURE — 25000003 PHARM REV CODE 250: Performed by: EMERGENCY MEDICINE

## 2024-10-23 PROCEDURE — 85610 PROTHROMBIN TIME: CPT | Performed by: EMERGENCY MEDICINE

## 2024-10-23 PROCEDURE — 86900 BLOOD TYPING SEROLOGIC ABO: CPT | Performed by: EMERGENCY MEDICINE

## 2024-10-23 PROCEDURE — 36415 COLL VENOUS BLD VENIPUNCTURE: CPT | Performed by: EMERGENCY MEDICINE

## 2024-10-23 PROCEDURE — 63600175 PHARM REV CODE 636 W HCPCS: Performed by: EMERGENCY MEDICINE

## 2024-10-23 PROCEDURE — 81000 URINALYSIS NONAUTO W/SCOPE: CPT | Mod: 59 | Performed by: EMERGENCY MEDICINE

## 2024-10-23 PROCEDURE — 63600175 PHARM REV CODE 636 W HCPCS

## 2024-10-23 PROCEDURE — 85025 COMPLETE CBC W/AUTO DIFF WBC: CPT | Performed by: EMERGENCY MEDICINE

## 2024-10-23 PROCEDURE — P9017 PLASMA 1 DONOR FRZ W/IN 8 HR: HCPCS | Performed by: EMERGENCY MEDICINE

## 2024-10-23 PROCEDURE — 63600175 PHARM REV CODE 636 W HCPCS: Performed by: INTERNAL MEDICINE

## 2024-10-23 PROCEDURE — 80307 DRUG TEST PRSMV CHEM ANLYZR: CPT | Performed by: EMERGENCY MEDICINE

## 2024-10-23 PROCEDURE — 99223 1ST HOSP IP/OBS HIGH 75: CPT | Mod: ,,, | Performed by: INTERNAL MEDICINE

## 2024-10-23 PROCEDURE — 85610 PROTHROMBIN TIME: CPT | Mod: 91 | Performed by: INTERNAL MEDICINE

## 2024-10-23 PROCEDURE — 87088 URINE BACTERIA CULTURE: CPT | Performed by: EMERGENCY MEDICINE

## 2024-10-23 PROCEDURE — 36415 COLL VENOUS BLD VENIPUNCTURE: CPT | Performed by: INTERNAL MEDICINE

## 2024-10-23 PROCEDURE — 30233K1 TRANSFUSION OF NONAUTOLOGOUS FROZEN PLASMA INTO PERIPHERAL VEIN, PERCUTANEOUS APPROACH: ICD-10-PCS | Performed by: STUDENT IN AN ORGANIZED HEALTH CARE EDUCATION/TRAINING PROGRAM

## 2024-10-23 PROCEDURE — 87081 CULTURE SCREEN ONLY: CPT | Performed by: EMERGENCY MEDICINE

## 2024-10-23 PROCEDURE — 27200966 HC CLOSED SUCTION SYSTEM

## 2024-10-23 PROCEDURE — 43235 EGD DIAGNOSTIC BRUSH WASH: CPT | Performed by: INTERNAL MEDICINE

## 2024-10-23 PROCEDURE — 43235 EGD DIAGNOSTIC BRUSH WASH: CPT | Mod: 22,,, | Performed by: INTERNAL MEDICINE

## 2024-10-23 PROCEDURE — 25000003 PHARM REV CODE 250

## 2024-10-23 PROCEDURE — 0DJ08ZZ INSPECTION OF UPPER INTESTINAL TRACT, VIA NATURAL OR ARTIFICIAL OPENING ENDOSCOPIC: ICD-10-PCS | Performed by: INTERNAL MEDICINE

## 2024-10-23 PROCEDURE — P9016 RBC LEUKOCYTES REDUCED: HCPCS | Performed by: EMERGENCY MEDICINE

## 2024-10-23 PROCEDURE — 86850 RBC ANTIBODY SCREEN: CPT | Performed by: EMERGENCY MEDICINE

## 2024-10-23 PROCEDURE — 94002 VENT MGMT INPAT INIT DAY: CPT

## 2024-10-23 RX ORDER — FENTANYL CITRATE-0.9 % NACL/PF 10 MCG/ML
0-250 PLASTIC BAG, INJECTION (ML) INTRAVENOUS CONTINUOUS
Status: DISCONTINUED | OUTPATIENT
Start: 2024-10-23 | End: 2024-10-26

## 2024-10-23 RX ORDER — PANTOPRAZOLE SODIUM 40 MG/10ML
80 INJECTION, POWDER, LYOPHILIZED, FOR SOLUTION INTRAVENOUS
Status: COMPLETED | OUTPATIENT
Start: 2024-10-23 | End: 2024-10-23

## 2024-10-23 RX ORDER — HYDROCODONE BITARTRATE AND ACETAMINOPHEN 500; 5 MG/1; MG/1
TABLET ORAL
Status: DISCONTINUED | OUTPATIENT
Start: 2024-10-23 | End: 2024-10-24

## 2024-10-23 RX ORDER — GLUCAGON 1 MG
1 KIT INJECTION
Status: DISCONTINUED | OUTPATIENT
Start: 2024-10-23 | End: 2024-10-29 | Stop reason: HOSPADM

## 2024-10-23 RX ORDER — HYDROXYZINE HYDROCHLORIDE 50 MG/1
50 TABLET, FILM COATED ORAL EVERY 8 HOURS PRN
COMMUNITY

## 2024-10-23 RX ORDER — FENTANYL CITRATE 50 UG/ML
INJECTION, SOLUTION INTRAMUSCULAR; INTRAVENOUS
Status: COMPLETED
Start: 2024-10-23 | End: 2024-10-23

## 2024-10-23 RX ORDER — CEFTRIAXONE 1 G/1
1 INJECTION, POWDER, FOR SOLUTION INTRAMUSCULAR; INTRAVENOUS
Status: COMPLETED | OUTPATIENT
Start: 2024-10-23 | End: 2024-10-23

## 2024-10-23 RX ORDER — DEXTROMETHORPHAN/PSEUDOEPHED 2.5-7.5/.8
DROPS ORAL
Status: COMPLETED | OUTPATIENT
Start: 2024-10-23 | End: 2024-10-23

## 2024-10-23 RX ORDER — PROPOFOL 10 MG/ML
0-50 INJECTION, EMULSION INTRAVENOUS CONTINUOUS
Status: DISCONTINUED | OUTPATIENT
Start: 2024-10-23 | End: 2024-10-26

## 2024-10-23 RX ORDER — CEFTRIAXONE 1 G/1
1 INJECTION, POWDER, FOR SOLUTION INTRAMUSCULAR; INTRAVENOUS
Status: DISCONTINUED | OUTPATIENT
Start: 2024-10-24 | End: 2024-10-29 | Stop reason: HOSPADM

## 2024-10-23 RX ORDER — IBUPROFEN 200 MG
24 TABLET ORAL
Status: DISCONTINUED | OUTPATIENT
Start: 2024-10-23 | End: 2024-10-23

## 2024-10-23 RX ORDER — ROCURONIUM BROMIDE 10 MG/ML
70 INJECTION, SOLUTION INTRAVENOUS ONCE
Status: DISCONTINUED | OUTPATIENT
Start: 2024-10-23 | End: 2024-10-23

## 2024-10-23 RX ORDER — ROCURONIUM BROMIDE 10 MG/ML
100 INJECTION, SOLUTION INTRAVENOUS ONCE
Status: COMPLETED | OUTPATIENT
Start: 2024-10-23 | End: 2024-10-23

## 2024-10-23 RX ORDER — MUPIROCIN 20 MG/G
OINTMENT TOPICAL 2 TIMES DAILY
Status: COMPLETED | OUTPATIENT
Start: 2024-10-23 | End: 2024-10-28

## 2024-10-23 RX ORDER — ETOMIDATE 2 MG/ML
20 INJECTION INTRAVENOUS
Status: COMPLETED | OUTPATIENT
Start: 2024-10-23 | End: 2024-10-23

## 2024-10-23 RX ORDER — IBUPROFEN 200 MG
16 TABLET ORAL
Status: DISCONTINUED | OUTPATIENT
Start: 2024-10-23 | End: 2024-10-23

## 2024-10-23 RX ORDER — HYDRALAZINE HYDROCHLORIDE 50 MG/1
50 TABLET, FILM COATED ORAL EVERY 8 HOURS
COMMUNITY
Start: 2023-09-19

## 2024-10-23 RX ORDER — DEXTROSE MONOHYDRATE 100 MG/ML
INJECTION, SOLUTION INTRAVENOUS CONTINUOUS
Status: DISCONTINUED | OUTPATIENT
Start: 2024-10-23 | End: 2024-10-27

## 2024-10-23 RX ORDER — FENTANYL CITRATE 50 UG/ML
50 INJECTION, SOLUTION INTRAMUSCULAR; INTRAVENOUS ONCE
Status: COMPLETED | OUTPATIENT
Start: 2024-10-23 | End: 2024-10-23

## 2024-10-23 RX ORDER — PROPOFOL 10 MG/ML
INJECTION, EMULSION INTRAVENOUS
Status: COMPLETED
Start: 2024-10-23 | End: 2024-10-23

## 2024-10-23 RX ORDER — OCTREOTIDE ACETATE 50 UG/ML
50 INJECTION, SOLUTION INTRAVENOUS; SUBCUTANEOUS
Status: COMPLETED | OUTPATIENT
Start: 2024-10-23 | End: 2024-10-23

## 2024-10-23 RX ORDER — PANTOPRAZOLE SODIUM 40 MG/10ML
80 INJECTION, POWDER, LYOPHILIZED, FOR SOLUTION INTRAVENOUS 2 TIMES DAILY
Status: DISCONTINUED | OUTPATIENT
Start: 2024-10-23 | End: 2024-10-24

## 2024-10-23 RX ORDER — NIFEDIPINE 60 MG/1
1 TABLET, EXTENDED RELEASE ORAL DAILY
COMMUNITY
Start: 2024-09-12

## 2024-10-23 RX ADMIN — ROCURONIUM BROMIDE 100 MG: 10 INJECTION INTRAVENOUS at 12:10

## 2024-10-23 RX ADMIN — MUPIROCIN: 20 OINTMENT TOPICAL at 08:10

## 2024-10-23 RX ADMIN — PROPOFOL 40 MCG/KG/MIN: 10 INJECTION, EMULSION INTRAVENOUS at 05:10

## 2024-10-23 RX ADMIN — CEFTRIAXONE 1 G: 1 INJECTION, POWDER, FOR SOLUTION INTRAMUSCULAR; INTRAVENOUS at 10:10

## 2024-10-23 RX ADMIN — PHYTONADIONE 10 MG: 10 INJECTION, EMULSION INTRAMUSCULAR; INTRAVENOUS; SUBCUTANEOUS at 04:10

## 2024-10-23 RX ADMIN — PROPOFOL 10 MCG/KG/MIN: 10 INJECTION, EMULSION INTRAVENOUS at 12:10

## 2024-10-23 RX ADMIN — DEXTROSE MONOHYDRATE: 100 INJECTION, SOLUTION INTRAVENOUS at 07:10

## 2024-10-23 RX ADMIN — SODIUM CHLORIDE 1000 ML: 9 INJECTION, SOLUTION INTRAVENOUS at 11:10

## 2024-10-23 RX ADMIN — ETOMIDATE 20 MG: 2 INJECTION INTRAVENOUS at 12:10

## 2024-10-23 RX ADMIN — PROPOFOL 30 MCG/KG/MIN: 10 INJECTION, EMULSION INTRAVENOUS at 08:10

## 2024-10-23 RX ADMIN — OCTREOTIDE ACETATE 50 MCG/HR: 500 INJECTION, SOLUTION INTRAVENOUS; SUBCUTANEOUS at 11:10

## 2024-10-23 RX ADMIN — DEXTROSE MONOHYDRATE 125 ML: 100 INJECTION, SOLUTION INTRAVENOUS at 06:10

## 2024-10-23 RX ADMIN — PANTOPRAZOLE SODIUM 80 MG: 40 INJECTION, POWDER, FOR SOLUTION INTRAVENOUS at 11:10

## 2024-10-23 RX ADMIN — OCTREOTIDE ACETATE 50 MCG: 50 INJECTION, SOLUTION INTRAVENOUS; SUBCUTANEOUS at 11:10

## 2024-10-23 RX ADMIN — PANTOPRAZOLE SODIUM 80 MG: 40 INJECTION, POWDER, FOR SOLUTION INTRAVENOUS at 08:10

## 2024-10-23 RX ADMIN — FENTANYL CITRATE 50 MCG: 50 INJECTION INTRAMUSCULAR; INTRAVENOUS at 02:10

## 2024-10-23 RX ADMIN — IOHEXOL 100 ML: 350 INJECTION, SOLUTION INTRAVENOUS at 01:10

## 2024-10-23 RX ADMIN — Medication 50 MCG/HR: at 04:10

## 2024-10-23 RX ADMIN — FENTANYL CITRATE 50 MCG: 50 INJECTION, SOLUTION INTRAMUSCULAR; INTRAVENOUS at 02:10

## 2024-10-24 ENCOUNTER — ANESTHESIA EVENT (OUTPATIENT)
Dept: ENDOSCOPY | Facility: HOSPITAL | Age: 59
End: 2024-10-24
Payer: MEDICAID

## 2024-10-24 ENCOUNTER — ANESTHESIA (OUTPATIENT)
Dept: ENDOSCOPY | Facility: HOSPITAL | Age: 59
End: 2024-10-24
Payer: MEDICAID

## 2024-10-24 VITALS
HEART RATE: 54 BPM | SYSTOLIC BLOOD PRESSURE: 115 MMHG | RESPIRATION RATE: 14 BRPM | DIASTOLIC BLOOD PRESSURE: 67 MMHG | OXYGEN SATURATION: 100 %

## 2024-10-24 PROBLEM — Z99.11 ON MECHANICALLY ASSISTED VENTILATION: Status: ACTIVE | Noted: 2024-10-24

## 2024-10-24 LAB
ACANTHOCYTES BLD QL SMEAR: PRESENT
ACANTHOCYTES BLD QL SMEAR: PRESENT
ALBUMIN SERPL BCP-MCNC: 3.1 G/DL (ref 3.5–5.2)
ALP SERPL-CCNC: 128 U/L (ref 40–150)
ALT SERPL W/O P-5'-P-CCNC: 299 U/L (ref 10–44)
AMMONIA PLAS-SCNC: 51 UMOL/L (ref 10–50)
ANION GAP SERPL CALC-SCNC: 15 MMOL/L (ref 8–16)
ANISOCYTOSIS BLD QL SMEAR: SLIGHT
APPEARANCE FLD: NORMAL
AST SERPL-CCNC: 502 U/L (ref 10–40)
BASOPHILS # BLD AUTO: 0.03 K/UL (ref 0–0.2)
BASOPHILS # BLD AUTO: 0.04 K/UL (ref 0–0.2)
BASOPHILS # BLD AUTO: 0.05 K/UL (ref 0–0.2)
BASOPHILS NFR BLD: 0.3 % (ref 0–1.9)
BASOPHILS NFR BLD: 0.4 % (ref 0–1.9)
BILIRUB SERPL-MCNC: 1.6 MG/DL (ref 0.1–1)
BLD PROD TYP BPU: NORMAL
BLOOD UNIT EXPIRATION DATE: NORMAL
BLOOD UNIT TYPE CODE: 5100
BLOOD UNIT TYPE: NORMAL
BODY FLD TYPE: NORMAL
BUN SERPL-MCNC: 38 MG/DL (ref 6–20)
CALCIUM SERPL-MCNC: 8.5 MG/DL (ref 8.7–10.5)
CHLORIDE SERPL-SCNC: 99 MMOL/L (ref 95–110)
CO2 SERPL-SCNC: 24 MMOL/L (ref 23–29)
CODING SYSTEM: NORMAL
COLOR FLD: YELLOW
CREAT SERPL-MCNC: 7.4 MG/DL (ref 0.5–1.4)
CROSSMATCH INTERPRETATION: NORMAL
DACRYOCYTES BLD QL SMEAR: ABNORMAL
DACRYOCYTES BLD QL SMEAR: ABNORMAL
DIFFERENTIAL METHOD BLD: ABNORMAL
DISPENSE STATUS: NORMAL
EOSINOPHIL # BLD AUTO: 0.2 K/UL (ref 0–0.5)
EOSINOPHIL # BLD AUTO: 0.4 K/UL (ref 0–0.5)
EOSINOPHIL # BLD AUTO: 0.5 K/UL (ref 0–0.5)
EOSINOPHIL NFR BLD: 2.3 % (ref 0–8)
EOSINOPHIL NFR BLD: 4 % (ref 0–8)
EOSINOPHIL NFR BLD: 4.4 % (ref 0–8)
EOSINOPHIL NFR BLD: 4.4 % (ref 0–8)
EOSINOPHIL NFR BLD: 4.7 % (ref 0–8)
ERYTHROCYTE [DISTWIDTH] IN BLOOD BY AUTOMATED COUNT: 16.6 % (ref 11.5–14.5)
ERYTHROCYTE [DISTWIDTH] IN BLOOD BY AUTOMATED COUNT: 17.4 % (ref 11.5–14.5)
ERYTHROCYTE [DISTWIDTH] IN BLOOD BY AUTOMATED COUNT: 18.3 % (ref 11.5–14.5)
EST. GFR  (NO RACE VARIABLE): 8 ML/MIN/1.73 M^2
GLUCOSE SERPL-MCNC: 78 MG/DL (ref 70–110)
HCT VFR BLD AUTO: 19.5 % (ref 40–54)
HCT VFR BLD AUTO: 22.3 % (ref 40–54)
HCT VFR BLD AUTO: 23.6 % (ref 40–54)
HCT VFR BLD AUTO: 23.6 % (ref 40–54)
HCT VFR BLD AUTO: 29.8 % (ref 40–54)
HCV RNA SERPL NAA+PROBE-LOG IU: 3.32 LOGIU/ML
HCV RNA SERPL QL NAA+PROBE: DETECTED
HCV RNA SPEC NAA+PROBE-ACNC: 2099 IU/ML
HGB BLD-MCNC: 10.3 G/DL (ref 14–18)
HGB BLD-MCNC: 7.5 G/DL (ref 14–18)
HGB BLD-MCNC: 8.2 G/DL (ref 14–18)
HGB BLD-MCNC: 8.2 G/DL (ref 14–18)
HGB BLD-MCNC: 8.8 G/DL (ref 14–18)
IMM GRANULOCYTES # BLD AUTO: 0.05 K/UL (ref 0–0.04)
IMM GRANULOCYTES # BLD AUTO: 0.05 K/UL (ref 0–0.04)
IMM GRANULOCYTES # BLD AUTO: 0.06 K/UL (ref 0–0.04)
IMM GRANULOCYTES # BLD AUTO: 0.07 K/UL (ref 0–0.04)
IMM GRANULOCYTES # BLD AUTO: 0.08 K/UL (ref 0–0.04)
IMM GRANULOCYTES NFR BLD AUTO: 0.5 % (ref 0–0.5)
IMM GRANULOCYTES NFR BLD AUTO: 0.7 % (ref 0–0.5)
IMM GRANULOCYTES NFR BLD AUTO: 0.8 % (ref 0–0.5)
INR PPP: 1.1 (ref 0.8–1.2)
LYMPHOCYTES # BLD AUTO: 0.6 K/UL (ref 1–4.8)
LYMPHOCYTES # BLD AUTO: 0.9 K/UL (ref 1–4.8)
LYMPHOCYTES # BLD AUTO: 0.9 K/UL (ref 1–4.8)
LYMPHOCYTES # BLD AUTO: 1 K/UL (ref 1–4.8)
LYMPHOCYTES # BLD AUTO: 1 K/UL (ref 1–4.8)
LYMPHOCYTES NFR BLD: 10.2 % (ref 18–48)
LYMPHOCYTES NFR BLD: 10.5 % (ref 18–48)
LYMPHOCYTES NFR BLD: 5.6 % (ref 18–48)
LYMPHOCYTES NFR BLD: 9.5 % (ref 18–48)
LYMPHOCYTES NFR BLD: 9.5 % (ref 18–48)
LYMPHOCYTES NFR FLD MANUAL: 13 %
MAGNESIUM SERPL-MCNC: 1.9 MG/DL (ref 1.6–2.6)
MCH RBC QN AUTO: 30 PG (ref 27–31)
MCH RBC QN AUTO: 30.5 PG (ref 27–31)
MCH RBC QN AUTO: 30.5 PG (ref 27–31)
MCH RBC QN AUTO: 34.1 PG (ref 27–31)
MCH RBC QN AUTO: 34.1 PG (ref 27–31)
MCHC RBC AUTO-ENTMCNC: 34.6 G/DL (ref 32–36)
MCHC RBC AUTO-ENTMCNC: 34.7 G/DL (ref 32–36)
MCHC RBC AUTO-ENTMCNC: 34.7 G/DL (ref 32–36)
MCHC RBC AUTO-ENTMCNC: 38.5 G/DL (ref 32–36)
MCHC RBC AUTO-ENTMCNC: 39.5 G/DL (ref 32–36)
MCV RBC AUTO: 86 FL (ref 82–98)
MCV RBC AUTO: 87 FL (ref 82–98)
MCV RBC AUTO: 88 FL (ref 82–98)
MCV RBC AUTO: 88 FL (ref 82–98)
MCV RBC AUTO: 89 FL (ref 82–98)
MESOTHL CELL NFR FLD MANUAL: 1 %
MONOCYTES # BLD AUTO: 1 K/UL (ref 0.3–1)
MONOCYTES # BLD AUTO: 1 K/UL (ref 0.3–1)
MONOCYTES # BLD AUTO: 1.1 K/UL (ref 0.3–1)
MONOCYTES # BLD AUTO: 1.1 K/UL (ref 0.3–1)
MONOCYTES # BLD AUTO: 1.6 K/UL (ref 0.3–1)
MONOCYTES NFR BLD: 10.7 % (ref 4–15)
MONOCYTES NFR BLD: 11.4 % (ref 4–15)
MONOCYTES NFR BLD: 11.4 % (ref 4–15)
MONOCYTES NFR BLD: 15.4 % (ref 4–15)
MONOCYTES NFR BLD: 8.7 % (ref 4–15)
MONOS+MACROS NFR FLD MANUAL: 74 %
NEUTROPHILS # BLD AUTO: 7 K/UL (ref 1.8–7.7)
NEUTROPHILS # BLD AUTO: 7.2 K/UL (ref 1.8–7.7)
NEUTROPHILS # BLD AUTO: 7.3 K/UL (ref 1.8–7.7)
NEUTROPHILS # BLD AUTO: 7.3 K/UL (ref 1.8–7.7)
NEUTROPHILS # BLD AUTO: 9.1 K/UL (ref 1.8–7.7)
NEUTROPHILS NFR BLD: 71 % (ref 38–73)
NEUTROPHILS NFR BLD: 73.7 % (ref 38–73)
NEUTROPHILS NFR BLD: 73.8 % (ref 38–73)
NEUTROPHILS NFR BLD: 73.8 % (ref 38–73)
NEUTROPHILS NFR BLD: 80.1 % (ref 38–73)
NEUTROPHILS NFR FLD MANUAL: 12 %
NRBC BLD-RTO: 0 /100 WBC
NUM UNITS TRANS PACKED RBC: NORMAL
OHS QRS DURATION: 80 MS
OHS QTC CALCULATION: 484 MS
PHOSPHATE SERPL-MCNC: 3.6 MG/DL (ref 2.7–4.5)
PLATELET # BLD AUTO: 118 K/UL (ref 150–450)
PLATELET # BLD AUTO: 71 K/UL (ref 150–450)
PLATELET # BLD AUTO: 72 K/UL (ref 150–450)
PLATELET # BLD AUTO: 87 K/UL (ref 150–450)
PLATELET # BLD AUTO: 87 K/UL (ref 150–450)
PLATELET BLD QL SMEAR: ABNORMAL
PMV BLD AUTO: 11 FL (ref 9.2–12.9)
PMV BLD AUTO: 11.4 FL (ref 9.2–12.9)
PMV BLD AUTO: 11.8 FL (ref 9.2–12.9)
PMV BLD AUTO: 11.9 FL (ref 9.2–12.9)
PMV BLD AUTO: 11.9 FL (ref 9.2–12.9)
POCT GLUCOSE: 64 MG/DL (ref 70–110)
POCT GLUCOSE: 77 MG/DL (ref 70–110)
POCT GLUCOSE: 77 MG/DL (ref 70–110)
POCT GLUCOSE: 82 MG/DL (ref 70–110)
POCT GLUCOSE: 86 MG/DL (ref 70–110)
POCT GLUCOSE: 90 MG/DL (ref 70–110)
POCT GLUCOSE: 99 MG/DL (ref 70–110)
POIKILOCYTOSIS BLD QL SMEAR: SLIGHT
POLYCHROMASIA BLD QL SMEAR: ABNORMAL
POTASSIUM SERPL-SCNC: 3.6 MMOL/L (ref 3.5–5.1)
PROT SERPL-MCNC: 5.9 G/DL (ref 6–8.4)
PROTHROMBIN TIME: 12.5 SEC (ref 9–12.5)
RBC # BLD AUTO: 2.2 M/UL (ref 4.6–6.2)
RBC # BLD AUTO: 2.58 M/UL (ref 4.6–6.2)
RBC # BLD AUTO: 2.69 M/UL (ref 4.6–6.2)
RBC # BLD AUTO: 2.69 M/UL (ref 4.6–6.2)
RBC # BLD AUTO: 3.43 M/UL (ref 4.6–6.2)
SODIUM SERPL-SCNC: 138 MMOL/L (ref 136–145)
WBC # BLD AUTO: 10.11 K/UL (ref 3.9–12.7)
WBC # BLD AUTO: 11.38 K/UL (ref 3.9–12.7)
WBC # BLD AUTO: 9.49 K/UL (ref 3.9–12.7)
WBC # BLD AUTO: 9.82 K/UL (ref 3.9–12.7)
WBC # BLD AUTO: 9.82 K/UL (ref 3.9–12.7)
WBC # FLD: 157 /CU MM

## 2024-10-24 PROCEDURE — 87205 SMEAR GRAM STAIN: CPT | Performed by: INTERNAL MEDICINE

## 2024-10-24 PROCEDURE — 84100 ASSAY OF PHOSPHORUS: CPT | Performed by: INTERNAL MEDICINE

## 2024-10-24 PROCEDURE — 85610 PROTHROMBIN TIME: CPT | Performed by: INTERNAL MEDICINE

## 2024-10-24 PROCEDURE — 63600175 PHARM REV CODE 636 W HCPCS: Performed by: INTERNAL MEDICINE

## 2024-10-24 PROCEDURE — 25000003 PHARM REV CODE 250: Performed by: INTERNAL MEDICINE

## 2024-10-24 PROCEDURE — 37000009 HC ANESTHESIA EA ADD 15 MINS: Performed by: INTERNAL MEDICINE

## 2024-10-24 PROCEDURE — 43235 EGD DIAGNOSTIC BRUSH WASH: CPT | Performed by: INTERNAL MEDICINE

## 2024-10-24 PROCEDURE — 63600175 PHARM REV CODE 636 W HCPCS: Performed by: EMERGENCY MEDICINE

## 2024-10-24 PROCEDURE — 25000003 PHARM REV CODE 250

## 2024-10-24 PROCEDURE — 89051 BODY FLUID CELL COUNT: CPT | Performed by: INTERNAL MEDICINE

## 2024-10-24 PROCEDURE — 43235 EGD DIAGNOSTIC BRUSH WASH: CPT | Mod: ,,, | Performed by: INTERNAL MEDICINE

## 2024-10-24 PROCEDURE — 83735 ASSAY OF MAGNESIUM: CPT | Performed by: INTERNAL MEDICINE

## 2024-10-24 PROCEDURE — P9016 RBC LEUKOCYTES REDUCED: HCPCS

## 2024-10-24 PROCEDURE — 84157 ASSAY OF PROTEIN OTHER: CPT | Performed by: INTERNAL MEDICINE

## 2024-10-24 PROCEDURE — 87522 HEPATITIS C REVRS TRNSCRPJ: CPT | Performed by: PHYSICIAN ASSISTANT

## 2024-10-24 PROCEDURE — 0W9G3ZZ DRAINAGE OF PERITONEAL CAVITY, PERCUTANEOUS APPROACH: ICD-10-PCS | Performed by: INTERNAL MEDICINE

## 2024-10-24 PROCEDURE — 80053 COMPREHEN METABOLIC PANEL: CPT | Performed by: INTERNAL MEDICINE

## 2024-10-24 PROCEDURE — 36415 COLL VENOUS BLD VENIPUNCTURE: CPT | Performed by: INTERNAL MEDICINE

## 2024-10-24 PROCEDURE — 99900026 HC AIRWAY MAINTENANCE (STAT)

## 2024-10-24 PROCEDURE — 37000008 HC ANESTHESIA 1ST 15 MINUTES: Performed by: INTERNAL MEDICINE

## 2024-10-24 PROCEDURE — 94003 VENT MGMT INPAT SUBQ DAY: CPT

## 2024-10-24 PROCEDURE — 80100014 HC HEMODIALYSIS 1:1

## 2024-10-24 PROCEDURE — 0DJ08ZZ INSPECTION OF UPPER INTESTINAL TRACT, VIA NATURAL OR ARTIFICIAL OPENING ENDOSCOPIC: ICD-10-PCS | Performed by: INTERNAL MEDICINE

## 2024-10-24 PROCEDURE — 82140 ASSAY OF AMMONIA: CPT | Performed by: INTERNAL MEDICINE

## 2024-10-24 PROCEDURE — 30233N1 TRANSFUSION OF NONAUTOLOGOUS RED BLOOD CELLS INTO PERIPHERAL VEIN, PERCUTANEOUS APPROACH: ICD-10-PCS | Performed by: STUDENT IN AN ORGANIZED HEALTH CARE EDUCATION/TRAINING PROGRAM

## 2024-10-24 PROCEDURE — 27100171 HC OXYGEN HIGH FLOW UP TO 24 HOURS

## 2024-10-24 PROCEDURE — 94761 N-INVAS EAR/PLS OXIMETRY MLT: CPT

## 2024-10-24 PROCEDURE — 25000003 PHARM REV CODE 250: Performed by: EMERGENCY MEDICINE

## 2024-10-24 PROCEDURE — 82042 OTHER SOURCE ALBUMIN QUAN EA: CPT | Performed by: INTERNAL MEDICINE

## 2024-10-24 PROCEDURE — P9047 ALBUMIN (HUMAN), 25%, 50ML: HCPCS | Mod: JZ,JG | Performed by: INTERNAL MEDICINE

## 2024-10-24 PROCEDURE — 87070 CULTURE OTHR SPECIMN AEROBIC: CPT | Performed by: INTERNAL MEDICINE

## 2024-10-24 PROCEDURE — 99232 SBSQ HOSP IP/OBS MODERATE 35: CPT | Mod: ,,, | Performed by: PHYSICIAN ASSISTANT

## 2024-10-24 PROCEDURE — 85025 COMPLETE CBC W/AUTO DIFF WBC: CPT | Mod: 91 | Performed by: INTERNAL MEDICINE

## 2024-10-24 PROCEDURE — 20000000 HC ICU ROOM

## 2024-10-24 PROCEDURE — 99900035 HC TECH TIME PER 15 MIN (STAT)

## 2024-10-24 PROCEDURE — 86920 COMPATIBILITY TEST SPIN: CPT

## 2024-10-24 PROCEDURE — 63600175 PHARM REV CODE 636 W HCPCS: Performed by: NURSE ANESTHETIST, CERTIFIED REGISTERED

## 2024-10-24 RX ORDER — CHLORHEXIDINE GLUCONATE ORAL RINSE 1.2 MG/ML
15 SOLUTION DENTAL 2 TIMES DAILY
Status: DISCONTINUED | OUTPATIENT
Start: 2024-10-24 | End: 2024-10-29 | Stop reason: HOSPADM

## 2024-10-24 RX ORDER — HYDROCODONE BITARTRATE AND ACETAMINOPHEN 500; 5 MG/1; MG/1
TABLET ORAL
Status: DISCONTINUED | OUTPATIENT
Start: 2024-10-24 | End: 2024-10-29 | Stop reason: HOSPADM

## 2024-10-24 RX ORDER — ALBUMIN HUMAN 250 G/1000ML
25 SOLUTION INTRAVENOUS ONCE
Status: COMPLETED | OUTPATIENT
Start: 2024-10-24 | End: 2024-10-24

## 2024-10-24 RX ORDER — LORAZEPAM 2 MG/ML
2 INJECTION INTRAMUSCULAR
Status: DISCONTINUED | OUTPATIENT
Start: 2024-10-24 | End: 2024-10-29 | Stop reason: HOSPADM

## 2024-10-24 RX ORDER — METOCLOPRAMIDE HYDROCHLORIDE 5 MG/ML
10 INJECTION INTRAMUSCULAR; INTRAVENOUS ONCE
Status: DISCONTINUED | OUTPATIENT
Start: 2024-10-24 | End: 2024-10-24

## 2024-10-24 RX ORDER — METOCLOPRAMIDE HYDROCHLORIDE 5 MG/ML
5 INJECTION INTRAMUSCULAR; INTRAVENOUS ONCE
Status: COMPLETED | OUTPATIENT
Start: 2024-10-24 | End: 2024-10-24

## 2024-10-24 RX ORDER — PROPOFOL 10 MG/ML
VIAL (ML) INTRAVENOUS
Status: DISCONTINUED | OUTPATIENT
Start: 2024-10-24 | End: 2024-10-24

## 2024-10-24 RX ORDER — PANTOPRAZOLE SODIUM 40 MG/10ML
40 INJECTION, POWDER, LYOPHILIZED, FOR SOLUTION INTRAVENOUS 2 TIMES DAILY
Status: DISCONTINUED | OUTPATIENT
Start: 2024-10-24 | End: 2024-10-29 | Stop reason: HOSPADM

## 2024-10-24 RX ADMIN — PROPOFOL 30 MCG/KG/MIN: 10 INJECTION, EMULSION INTRAVENOUS at 07:10

## 2024-10-24 RX ADMIN — DEXTROSE MONOHYDRATE 125 ML: 100 INJECTION, SOLUTION INTRAVENOUS at 04:10

## 2024-10-24 RX ADMIN — CHLORHEXIDINE GLUCONATE 0.12% ORAL RINSE 15 ML: 1.2 LIQUID ORAL at 10:10

## 2024-10-24 RX ADMIN — ALBUMIN (HUMAN) 25 G: 5 SOLUTION INTRAVENOUS at 10:10

## 2024-10-24 RX ADMIN — PROPOFOL 20 MG: 10 INJECTION, EMULSION INTRAVENOUS at 02:10

## 2024-10-24 RX ADMIN — DEXTROSE MONOHYDRATE: 100 INJECTION, SOLUTION INTRAVENOUS at 10:10

## 2024-10-24 RX ADMIN — PANTOPRAZOLE SODIUM 80 MG: 40 INJECTION, POWDER, FOR SOLUTION INTRAVENOUS at 08:10

## 2024-10-24 RX ADMIN — MUPIROCIN: 20 OINTMENT TOPICAL at 08:10

## 2024-10-24 RX ADMIN — PROPOFOL 25 MCG/KG/MIN: 10 INJECTION, EMULSION INTRAVENOUS at 02:10

## 2024-10-24 RX ADMIN — METOCLOPRAMIDE 5 MG: 5 INJECTION, SOLUTION INTRAMUSCULAR; INTRAVENOUS at 11:10

## 2024-10-24 RX ADMIN — CHLORHEXIDINE GLUCONATE 0.12% ORAL RINSE 15 ML: 1.2 LIQUID ORAL at 08:10

## 2024-10-24 RX ADMIN — PROPOFOL 30 MCG/KG/MIN: 10 INJECTION, EMULSION INTRAVENOUS at 06:10

## 2024-10-24 RX ADMIN — DEXTROSE MONOHYDRATE: 100 INJECTION, SOLUTION INTRAVENOUS at 11:10

## 2024-10-24 RX ADMIN — PANTOPRAZOLE SODIUM 40 MG: 40 INJECTION, POWDER, FOR SOLUTION INTRAVENOUS at 08:10

## 2024-10-24 RX ADMIN — PHYTONADIONE 10 MG: 10 INJECTION, EMULSION INTRAMUSCULAR; INTRAVENOUS; SUBCUTANEOUS at 04:10

## 2024-10-24 RX ADMIN — CEFTRIAXONE 1 G: 1 INJECTION, POWDER, FOR SOLUTION INTRAMUSCULAR; INTRAVENOUS at 10:10

## 2024-10-24 RX ADMIN — LORAZEPAM 2 MG: 2 INJECTION INTRAMUSCULAR; INTRAVENOUS at 04:10

## 2024-10-24 RX ADMIN — Medication 150 MCG/HR: at 08:10

## 2024-10-24 RX ADMIN — PROPOFOL 20 MCG/KG/MIN: 10 INJECTION, EMULSION INTRAVENOUS at 01:10

## 2024-10-24 NOTE — ANESTHESIA POSTPROCEDURE EVALUATION
Anesthesia Post Evaluation    Patient: David Guzman    Procedure(s) Performed: Procedure(s) (LRB):  EGD (ESOPHAGOGASTRODUODENOSCOPY) (N/A)    Final Anesthesia Type: general      Patient location during evaluation: ICU  Patient participation: No - Unable to Participate, Intubation  Level of consciousness: awake and alert and sedated  Post-procedure vital signs: reviewed and stable  Pain management: adequate  Airway patency: patent    PONV status at discharge: No PONV  Anesthetic complications: no      Cardiovascular status: blood pressure returned to baseline, hemodynamically stable and stable  Respiratory status: ETT  Hydration status: euvolemic  Follow-up not needed.              Vitals Value Taken Time   /63 10/24/24 1512   Temp  10/24/24 1516   Pulse 54 10/24/24 1516   Resp 8 10/24/24 1516   SpO2 100 % 10/24/24 1516   Vitals shown include unfiled device data.      No case tracking events are documented in the log.      Pain/Deisy Score: Pain Rating Prior to Med Admin: 0 (10/24/2024  8:27 AM)  Pain Rating Post Med Admin: 0 (10/24/2024  8:57 AM)

## 2024-10-24 NOTE — ANESTHESIA PREPROCEDURE EVALUATION
10/24/2024  David Guzman is a 59 y.o., male.    Patient Active Problem List   Diagnosis    Cellulitis of right upper extremity    Hypertension    ESRD (end stage renal disease) on dialysis    BPH (benign prostatic hyperplasia)    GERD (gastroesophageal reflux disease)    Class 1 obesity due to excess calories with serious comorbidity and body mass index (BMI) of 32.0 to 32.9 in adult    ESRD needing dialysis    Uremia    Volume overload    Facial rash    H/O: substance abuse    Normocytic anemia    Thrombocytopenia    Elevated troponin    Abnormal EKG    Elevated alkaline phosphatase level    Obesity (BMI 30.0-34.9)    Tricuspid regurgitation    Hepatitis C antibody positive in blood    Acute hypoxemic respiratory failure    Hematemesis with nausea    Acute blood loss anemia    Decompensated HCV cirrhosis    Acute cystitis    On mechanically assisted ventilation      Past Surgical History:   Procedure Laterality Date    ESOPHAGOGASTRODUODENOSCOPY N/A 10/23/2024    Procedure: EGD (ESOPHAGOGASTRODUODENOSCOPY);  Surgeon: Gladys De León MD;  Location: [facility];  Service: Gastroenterology;  Laterality: N/A;    HERNIA REPAIR      Results for orders placed during the hospital encounter of 10/12/24    Echo    Interpretation Summary    Left Ventricle: The left ventricle is normal in size. Normal wall thickness. There is concentric hypertrophy. Normal wall motion. There is normal systolic function with a visually estimated ejection fraction of 60 - 65%. Ejection fraction is approximately 65%. There is normal diastolic function.    Right Ventricle: Mild right ventricular enlargement. Systolic function is normal.    Left Atrium: Left atrium is severely dilated.    Right Atrium: Right atrium is moderately dilated.    Aortic Valve: The aortic valve is a trileaflet valve. Mildly calcified cusps. Mildly  restricted motion. There is mild stenosis. Aortic valve area by VTI is 1.5 cm². Aortic valve peak velocity is 2.8 m/s. Mean gradient is 19.7 mmHg. The dimensionless index is 0.49.    Mitral Valve: There is mild regurgitation.    Tricuspid Valve: There is moderate to severe regurgitation.    Pulmonary Artery: The estimated pulmonary artery systolic pressure is 45 mmHg.    IVC/SVC: Elevated venous pressure at 15 mmHg.    Pericardium: Ascites present.       Lab Results   Component Value Date    WBC 9.82 10/24/2024    WBC 9.82 10/24/2024    HGB 8.2 (L) 10/24/2024    HGB 8.2 (L) 10/24/2024    HCT 23.6 (L) 10/24/2024    HCT 23.6 (L) 10/24/2024    MCV 88 10/24/2024    MCV 88 10/24/2024    PLT 87 (L) 10/24/2024    PLT 87 (L) 10/24/2024       Chemistry        Component Value Date/Time     10/23/2024 1052    K 4.6 10/23/2024 1052    CL 99 10/23/2024 1052    CO2 24 10/23/2024 1052    BUN 59 (H) 10/23/2024 1052    CREATININE 11.5 (H) 10/23/2024 1052    GLU 84 10/23/2024 1052        Component Value Date/Time    CALCIUM 7.6 (L) 10/23/2024 1052    ALKPHOS 140 10/23/2024 1052    AST 54 (H) 10/23/2024 1052    ALT 36 10/23/2024 1052    BILITOT 0.9 10/23/2024 1052    ESTGFRAFRICA 3 01/28/2024 1245    ESTGFRAFRICA 58 (A) 11/06/2015 2332    EGFRNONAA 50 (A) 11/06/2015 2332         Pre-op Assessment    I have reviewed the Patient Summary Reports.     I have reviewed the Nursing Notes. I have reviewed the NPO Status.   I have reviewed the Medications.     Review of Systems  Cardiovascular:     Hypertension                                    Hypertension         Renal/:  Chronic Renal Disease        Kidney Function/Disease             Hepatic/GI:     GERD Liver Disease, Hepatitis       Gerd       Liver Disease, Hepatitis            Physical Exam  General: Unconscious    Airway:  Mallampati: unable to assess   TM Distance: Normal  Tongue: Normal    Dental:  Intact        Anesthesia Plan  Type of Anesthesia, risks & benefits  discussed:    Anesthesia Type: MAC, Gen ETT  Intra-op Monitoring Plan: Standard ASA Monitors  Post Op Pain Control Plan: multimodal analgesia  Induction:  IV  Informed Consent: Informed consent signed with the Patient and all parties understand the risks and agree with anesthesia plan.  All questions answered.   ASA Score: 4  Day of Surgery Review of History & Physical: H&P Update referred to the surgeon/provider.    Ready For Surgery From Anesthesia Perspective.     .

## 2024-10-24 NOTE — TRANSFER OF CARE
"Anesthesia Transfer of Care Note    Patient: David Guzman    Procedure(s) Performed: Procedure(s) (LRB):  EGD (ESOPHAGOGASTRODUODENOSCOPY) (N/A)    Patient location: ICU    Anesthesia Type: MAC    Post pain: adequate analgesia    Post assessment: no apparent anesthetic complications and tolerated procedure well    Post vital signs: stable    Level of consciousness: responds to stimulation and sedated    Nausea/Vomiting: no nausea/vomiting    Complications: none    Transfer of care protocol was followedComments: Report to primary RN       Last vitals: Visit Vitals  /71   Pulse (!) 59   Temp 36.6 °C (97.9 °F) (Oral)   Resp (!) 9   Ht 5' 7" (1.702 m)   Wt 100.8 kg (222 lb 3.6 oz)   SpO2 100%   BMI 34.81 kg/m²     "

## 2024-10-25 PROBLEM — K92.2 GASTROINTESTINAL HEMORRHAGE: Status: ACTIVE | Noted: 2024-10-25

## 2024-10-25 LAB
ALBUMIN FLD-MCNC: 1 G/DL
ALBUMIN SERPL BCP-MCNC: 2.2 G/DL (ref 3.5–5.2)
ALLENS TEST: ABNORMAL
ALP SERPL-CCNC: 98 U/L (ref 40–150)
ALT SERPL W/O P-5'-P-CCNC: 214 U/L (ref 10–44)
ANION GAP SERPL CALC-SCNC: 12 MMOL/L (ref 8–16)
AST SERPL-CCNC: 301 U/L (ref 10–40)
BACTERIA UR CULT: ABNORMAL
BASOPHILS # BLD AUTO: 0.04 K/UL (ref 0–0.2)
BASOPHILS NFR BLD: 0.4 % (ref 0–1.9)
BILIRUB SERPL-MCNC: 1 MG/DL (ref 0.1–1)
BUN SERPL-MCNC: 41 MG/DL (ref 6–20)
CALCIUM SERPL-MCNC: 7.8 MG/DL (ref 8.7–10.5)
CHLORIDE SERPL-SCNC: 100 MMOL/L (ref 95–110)
CO2 SERPL-SCNC: 24 MMOL/L (ref 23–29)
CREAT SERPL-MCNC: 8.7 MG/DL (ref 0.5–1.4)
DELSYS: ABNORMAL
DIFFERENTIAL METHOD BLD: ABNORMAL
EOSINOPHIL # BLD AUTO: 0.7 K/UL (ref 0–0.5)
EOSINOPHIL NFR BLD: 7.2 % (ref 0–8)
ERYTHROCYTE [DISTWIDTH] IN BLOOD BY AUTOMATED COUNT: 16.8 % (ref 11.5–14.5)
ERYTHROCYTE [SEDIMENTATION RATE] IN BLOOD BY WESTERGREN METHOD: 18 MM/H
EST. GFR  (NO RACE VARIABLE): 7 ML/MIN/1.73 M^2
FIO2: 30
GLUCOSE SERPL-MCNC: 93 MG/DL (ref 70–110)
HAV IGM SERPL QL IA: ABNORMAL
HBV CORE IGM SERPL QL IA: ABNORMAL
HBV SURFACE AG SERPL QL IA: ABNORMAL
HCO3 UR-SCNC: 24.5 MMOL/L (ref 24–28)
HCT VFR BLD AUTO: 25.3 % (ref 40–54)
HCV AB SERPL QL IA: REACTIVE
HGB BLD-MCNC: 8.6 G/DL (ref 14–18)
IMM GRANULOCYTES # BLD AUTO: 0.06 K/UL (ref 0–0.04)
IMM GRANULOCYTES NFR BLD AUTO: 0.7 % (ref 0–0.5)
LYMPHOCYTES # BLD AUTO: 0.7 K/UL (ref 1–4.8)
LYMPHOCYTES NFR BLD: 7.5 % (ref 18–48)
MAGNESIUM SERPL-MCNC: 1.7 MG/DL (ref 1.6–2.6)
MCH RBC QN AUTO: 29.8 PG (ref 27–31)
MCHC RBC AUTO-ENTMCNC: 34 G/DL (ref 32–36)
MCV RBC AUTO: 88 FL (ref 82–98)
MODE: ABNORMAL
MONOCYTES # BLD AUTO: 1.2 K/UL (ref 0.3–1)
MONOCYTES NFR BLD: 13.1 % (ref 4–15)
MRSA SPEC QL CULT: NORMAL
NEUTROPHILS # BLD AUTO: 6.5 K/UL (ref 1.8–7.7)
NEUTROPHILS NFR BLD: 71.1 % (ref 38–73)
NRBC BLD-RTO: 0 /100 WBC
PCO2 BLDA: 33.4 MMHG (ref 35–45)
PEEP: 5
PH SMN: 7.47 [PH] (ref 7.35–7.45)
PHOSPHATE SERPL-MCNC: 3.8 MG/DL (ref 2.7–4.5)
PLATELET # BLD AUTO: 118 K/UL (ref 150–450)
PMV BLD AUTO: 11.2 FL (ref 9.2–12.9)
PO2 BLDA: 71 MMHG (ref 80–100)
POC BE: 1 MMOL/L
POC SATURATED O2: 95 % (ref 95–100)
POCT GLUCOSE: 121 MG/DL (ref 70–110)
POCT GLUCOSE: 121 MG/DL (ref 70–110)
POCT GLUCOSE: 80 MG/DL (ref 70–110)
POCT GLUCOSE: 88 MG/DL (ref 70–110)
POCT GLUCOSE: 97 MG/DL (ref 70–110)
POCT GLUCOSE: 98 MG/DL (ref 70–110)
POTASSIUM SERPL-SCNC: 3.4 MMOL/L (ref 3.5–5.1)
PROT FLD-MCNC: 1.6 G/DL
PROT SERPL-MCNC: 4.3 G/DL (ref 6–8.4)
RBC # BLD AUTO: 2.89 M/UL (ref 4.6–6.2)
SAMPLE: ABNORMAL
SITE: ABNORMAL
SODIUM SERPL-SCNC: 136 MMOL/L (ref 136–145)
SPECIMEN SOURCE: NORMAL
SPECIMEN SOURCE: NORMAL
TRIGL SERPL-MCNC: 186 MG/DL (ref 30–150)
VT: 450
WBC # BLD AUTO: 9.07 K/UL (ref 3.9–12.7)

## 2024-10-25 PROCEDURE — 84100 ASSAY OF PHOSPHORUS: CPT | Performed by: INTERNAL MEDICINE

## 2024-10-25 PROCEDURE — 80053 COMPREHEN METABOLIC PANEL: CPT | Performed by: INTERNAL MEDICINE

## 2024-10-25 PROCEDURE — 80074 ACUTE HEPATITIS PANEL: CPT | Performed by: INTERNAL MEDICINE

## 2024-10-25 PROCEDURE — 25000003 PHARM REV CODE 250: Performed by: INTERNAL MEDICINE

## 2024-10-25 PROCEDURE — 36600 WITHDRAWAL OF ARTERIAL BLOOD: CPT

## 2024-10-25 PROCEDURE — 99900026 HC AIRWAY MAINTENANCE (STAT)

## 2024-10-25 PROCEDURE — 25000003 PHARM REV CODE 250: Performed by: EMERGENCY MEDICINE

## 2024-10-25 PROCEDURE — 36415 COLL VENOUS BLD VENIPUNCTURE: CPT | Performed by: INTERNAL MEDICINE

## 2024-10-25 PROCEDURE — 63600175 PHARM REV CODE 636 W HCPCS: Performed by: INTERNAL MEDICINE

## 2024-10-25 PROCEDURE — 94003 VENT MGMT INPAT SUBQ DAY: CPT

## 2024-10-25 PROCEDURE — 99233 SBSQ HOSP IP/OBS HIGH 50: CPT | Mod: ,,, | Performed by: INTERNAL MEDICINE

## 2024-10-25 PROCEDURE — 85025 COMPLETE CBC W/AUTO DIFF WBC: CPT | Performed by: INTERNAL MEDICINE

## 2024-10-25 PROCEDURE — 27100171 HC OXYGEN HIGH FLOW UP TO 24 HOURS

## 2024-10-25 PROCEDURE — 95822 EEG COMA OR SLEEP ONLY: CPT

## 2024-10-25 PROCEDURE — 82803 BLOOD GASES ANY COMBINATION: CPT

## 2024-10-25 PROCEDURE — 20000000 HC ICU ROOM

## 2024-10-25 PROCEDURE — 63600175 PHARM REV CODE 636 W HCPCS: Performed by: EMERGENCY MEDICINE

## 2024-10-25 PROCEDURE — 99900035 HC TECH TIME PER 15 MIN (STAT)

## 2024-10-25 PROCEDURE — 94761 N-INVAS EAR/PLS OXIMETRY MLT: CPT | Mod: XB

## 2024-10-25 PROCEDURE — 83735 ASSAY OF MAGNESIUM: CPT | Performed by: INTERNAL MEDICINE

## 2024-10-25 PROCEDURE — 84478 ASSAY OF TRIGLYCERIDES: CPT | Performed by: INTERNAL MEDICINE

## 2024-10-25 RX ORDER — SODIUM CHLORIDE 9 MG/ML
INJECTION, SOLUTION INTRAVENOUS ONCE
Status: DISCONTINUED | OUTPATIENT
Start: 2024-10-25 | End: 2024-10-25

## 2024-10-25 RX ORDER — MAGNESIUM SULFATE HEPTAHYDRATE 40 MG/ML
2 INJECTION, SOLUTION INTRAVENOUS ONCE
Status: COMPLETED | OUTPATIENT
Start: 2024-10-25 | End: 2024-10-25

## 2024-10-25 RX ORDER — POTASSIUM CHLORIDE 7.45 MG/ML
10 INJECTION INTRAVENOUS
Status: COMPLETED | OUTPATIENT
Start: 2024-10-25 | End: 2024-10-25

## 2024-10-25 RX ADMIN — DEXTROSE MONOHYDRATE: 100 INJECTION, SOLUTION INTRAVENOUS at 09:10

## 2024-10-25 RX ADMIN — CHLORHEXIDINE GLUCONATE 0.12% ORAL RINSE 15 ML: 1.2 LIQUID ORAL at 08:10

## 2024-10-25 RX ADMIN — PHYTONADIONE 10 MG: 10 INJECTION, EMULSION INTRAMUSCULAR; INTRAVENOUS; SUBCUTANEOUS at 03:10

## 2024-10-25 RX ADMIN — MUPIROCIN: 20 OINTMENT TOPICAL at 08:10

## 2024-10-25 RX ADMIN — MAGNESIUM SULFATE HEPTAHYDRATE 2 G: 40 INJECTION, SOLUTION INTRAVENOUS at 09:10

## 2024-10-25 RX ADMIN — CEFTRIAXONE 1 G: 1 INJECTION, POWDER, FOR SOLUTION INTRAMUSCULAR; INTRAVENOUS at 11:10

## 2024-10-25 RX ADMIN — Medication 125 MCG/HR: at 03:10

## 2024-10-25 RX ADMIN — POTASSIUM CHLORIDE 10 MEQ: 7.46 INJECTION, SOLUTION INTRAVENOUS at 09:10

## 2024-10-25 RX ADMIN — PANTOPRAZOLE SODIUM 40 MG: 40 INJECTION, POWDER, FOR SOLUTION INTRAVENOUS at 08:10

## 2024-10-25 RX ADMIN — POTASSIUM CHLORIDE 10 MEQ: 7.46 INJECTION, SOLUTION INTRAVENOUS at 10:10

## 2024-10-25 RX ADMIN — PROPOFOL 20 MCG/KG/MIN: 10 INJECTION, EMULSION INTRAVENOUS at 02:10

## 2024-10-26 PROBLEM — G93.41 ENCEPHALOPATHY, METABOLIC: Status: ACTIVE | Noted: 2024-10-26

## 2024-10-26 PROBLEM — S31.809A BUTTOCK WOUND: Status: ACTIVE | Noted: 2024-10-26

## 2024-10-26 LAB
ALBUMIN SERPL BCP-MCNC: 2.5 G/DL (ref 3.5–5.2)
ALBUMIN SERPL BCP-MCNC: 2.5 G/DL (ref 3.5–5.2)
ALP SERPL-CCNC: 119 U/L (ref 40–150)
ALT SERPL W/O P-5'-P-CCNC: 176 U/L (ref 10–44)
AMMONIA PLAS-SCNC: 42 UMOL/L (ref 10–50)
ANION GAP SERPL CALC-SCNC: 13 MMOL/L (ref 8–16)
ANION GAP SERPL CALC-SCNC: 13 MMOL/L (ref 8–16)
AST SERPL-CCNC: 176 U/L (ref 10–40)
BASOPHILS # BLD AUTO: 0.06 K/UL (ref 0–0.2)
BASOPHILS NFR BLD: 0.5 % (ref 0–1.9)
BILIRUB SERPL-MCNC: 1.3 MG/DL (ref 0.1–1)
BUN SERPL-MCNC: 44 MG/DL (ref 6–20)
BUN SERPL-MCNC: 44 MG/DL (ref 6–20)
CALCIUM SERPL-MCNC: 8.1 MG/DL (ref 8.7–10.5)
CALCIUM SERPL-MCNC: 8.1 MG/DL (ref 8.7–10.5)
CHLORIDE SERPL-SCNC: 97 MMOL/L (ref 95–110)
CHLORIDE SERPL-SCNC: 97 MMOL/L (ref 95–110)
CO2 SERPL-SCNC: 20 MMOL/L (ref 23–29)
CO2 SERPL-SCNC: 20 MMOL/L (ref 23–29)
CREAT SERPL-MCNC: 9.7 MG/DL (ref 0.5–1.4)
CREAT SERPL-MCNC: 9.7 MG/DL (ref 0.5–1.4)
DIFFERENTIAL METHOD BLD: ABNORMAL
EOSINOPHIL # BLD AUTO: 0.9 K/UL (ref 0–0.5)
EOSINOPHIL NFR BLD: 7.2 % (ref 0–8)
ERYTHROCYTE [DISTWIDTH] IN BLOOD BY AUTOMATED COUNT: 16.4 % (ref 11.5–14.5)
EST. GFR  (NO RACE VARIABLE): 6 ML/MIN/1.73 M^2
EST. GFR  (NO RACE VARIABLE): 6 ML/MIN/1.73 M^2
GLUCOSE SERPL-MCNC: 134 MG/DL (ref 70–110)
GLUCOSE SERPL-MCNC: 134 MG/DL (ref 70–110)
HCT VFR BLD AUTO: 25.2 % (ref 40–54)
HGB BLD-MCNC: 9.3 G/DL (ref 14–18)
IMM GRANULOCYTES # BLD AUTO: 0.17 K/UL (ref 0–0.04)
IMM GRANULOCYTES NFR BLD AUTO: 1.4 % (ref 0–0.5)
LYMPHOCYTES # BLD AUTO: 0.9 K/UL (ref 1–4.8)
LYMPHOCYTES NFR BLD: 7.1 % (ref 18–48)
MAGNESIUM SERPL-MCNC: 2.1 MG/DL (ref 1.6–2.6)
MCH RBC QN AUTO: 33.2 PG (ref 27–31)
MCHC RBC AUTO-ENTMCNC: 36.9 G/DL (ref 32–36)
MCV RBC AUTO: 90 FL (ref 82–98)
MONOCYTES # BLD AUTO: 1.6 K/UL (ref 0.3–1)
MONOCYTES NFR BLD: 12.5 % (ref 4–15)
NEUTROPHILS # BLD AUTO: 8.9 K/UL (ref 1.8–7.7)
NEUTROPHILS NFR BLD: 71.3 % (ref 38–73)
NRBC BLD-RTO: 0 /100 WBC
PHOSPHATE SERPL-MCNC: 5.4 MG/DL (ref 2.7–4.5)
PHOSPHATE SERPL-MCNC: 5.4 MG/DL (ref 2.7–4.5)
PLATELET # BLD AUTO: 131 K/UL (ref 150–450)
PMV BLD AUTO: 11.2 FL (ref 9.2–12.9)
POCT GLUCOSE: 115 MG/DL (ref 70–110)
POCT GLUCOSE: 134 MG/DL (ref 70–110)
POCT GLUCOSE: 294 MG/DL (ref 70–110)
POCT GLUCOSE: 97 MG/DL (ref 70–110)
POTASSIUM SERPL-SCNC: 3.9 MMOL/L (ref 3.5–5.1)
POTASSIUM SERPL-SCNC: 3.9 MMOL/L (ref 3.5–5.1)
PROT SERPL-MCNC: 5.2 G/DL (ref 6–8.4)
RBC # BLD AUTO: 2.8 M/UL (ref 4.6–6.2)
SODIUM SERPL-SCNC: 130 MMOL/L (ref 136–145)
SODIUM SERPL-SCNC: 130 MMOL/L (ref 136–145)
WBC # BLD AUTO: 12.42 K/UL (ref 3.9–12.7)

## 2024-10-26 PROCEDURE — 36410 VNPNXR 3YR/> PHY/QHP DX/THER: CPT

## 2024-10-26 PROCEDURE — 84100 ASSAY OF PHOSPHORUS: CPT | Performed by: INTERNAL MEDICINE

## 2024-10-26 PROCEDURE — 36415 COLL VENOUS BLD VENIPUNCTURE: CPT | Performed by: INTERNAL MEDICINE

## 2024-10-26 PROCEDURE — 63600175 PHARM REV CODE 636 W HCPCS: Performed by: INTERNAL MEDICINE

## 2024-10-26 PROCEDURE — 80100014 HC HEMODIALYSIS 1:1

## 2024-10-26 PROCEDURE — 99222 1ST HOSP IP/OBS MODERATE 55: CPT | Mod: ,,, | Performed by: COLON & RECTAL SURGERY

## 2024-10-26 PROCEDURE — 25000003 PHARM REV CODE 250: Performed by: INTERNAL MEDICINE

## 2024-10-26 PROCEDURE — C9399 UNCLASSIFIED DRUGS OR BIOLOG: HCPCS | Performed by: INTERNAL MEDICINE

## 2024-10-26 PROCEDURE — 85025 COMPLETE CBC W/AUTO DIFF WBC: CPT | Performed by: INTERNAL MEDICINE

## 2024-10-26 PROCEDURE — 82140 ASSAY OF AMMONIA: CPT | Performed by: INTERNAL MEDICINE

## 2024-10-26 PROCEDURE — 99232 SBSQ HOSP IP/OBS MODERATE 35: CPT | Mod: ,,, | Performed by: INTERNAL MEDICINE

## 2024-10-26 PROCEDURE — 83735 ASSAY OF MAGNESIUM: CPT | Performed by: INTERNAL MEDICINE

## 2024-10-26 PROCEDURE — C1751 CATH, INF, PER/CENT/MIDLINE: HCPCS

## 2024-10-26 PROCEDURE — 20000000 HC ICU ROOM

## 2024-10-26 PROCEDURE — 80053 COMPREHEN METABOLIC PANEL: CPT | Performed by: INTERNAL MEDICINE

## 2024-10-26 RX ORDER — DEXMEDETOMIDINE HYDROCHLORIDE 4 UG/ML
0-1.4 INJECTION INTRAVENOUS CONTINUOUS
Status: DISCONTINUED | OUTPATIENT
Start: 2024-10-26 | End: 2024-10-27

## 2024-10-26 RX ORDER — LACTULOSE 10 G/15ML
200 SOLUTION ORAL; RECTAL 3 TIMES DAILY
Status: DISCONTINUED | OUTPATIENT
Start: 2024-10-26 | End: 2024-10-27

## 2024-10-26 RX ADMIN — PANTOPRAZOLE SODIUM 40 MG: 40 INJECTION, POWDER, FOR SOLUTION INTRAVENOUS at 11:10

## 2024-10-26 RX ADMIN — CHLORHEXIDINE GLUCONATE 0.12% ORAL RINSE 15 ML: 1.2 LIQUID ORAL at 09:10

## 2024-10-26 RX ADMIN — PANTOPRAZOLE SODIUM 40 MG: 40 INJECTION, POWDER, FOR SOLUTION INTRAVENOUS at 09:10

## 2024-10-26 RX ADMIN — DEXTROSE MONOHYDRATE: 100 INJECTION, SOLUTION INTRAVENOUS at 11:10

## 2024-10-26 RX ADMIN — DEXMEDETOMIDINE HYDROCHLORIDE 0.2 MCG/KG/HR: 4 INJECTION INTRAVENOUS at 09:10

## 2024-10-26 RX ADMIN — MUPIROCIN: 20 OINTMENT TOPICAL at 09:10

## 2024-10-26 RX ADMIN — LACTULOSE 200 G: 10 SOLUTION ORAL at 09:10

## 2024-10-26 RX ADMIN — DEXTROSE MONOHYDRATE: 100 INJECTION, SOLUTION INTRAVENOUS at 10:10

## 2024-10-26 RX ADMIN — LACTULOSE 200 G: 10 SOLUTION ORAL at 02:10

## 2024-10-26 RX ADMIN — CEFTRIAXONE 1 G: 1 INJECTION, POWDER, FOR SOLUTION INTRAMUSCULAR; INTRAVENOUS at 11:10

## 2024-10-26 RX ADMIN — DEXMEDETOMIDINE HYDROCHLORIDE 0.5 MCG/KG/HR: 4 INJECTION INTRAVENOUS at 10:10

## 2024-10-27 PROBLEM — Z99.11 ON MECHANICALLY ASSISTED VENTILATION: Status: RESOLVED | Noted: 2024-10-24 | Resolved: 2024-10-27

## 2024-10-27 LAB
ALBUMIN SERPL BCP-MCNC: 2.4 G/DL (ref 3.5–5.2)
ALBUMIN SERPL BCP-MCNC: 2.4 G/DL (ref 3.5–5.2)
ALP SERPL-CCNC: 121 U/L (ref 40–150)
ALT SERPL W/O P-5'-P-CCNC: 124 U/L (ref 10–44)
ANION GAP SERPL CALC-SCNC: 15 MMOL/L (ref 8–16)
ANION GAP SERPL CALC-SCNC: 15 MMOL/L (ref 8–16)
AST SERPL-CCNC: 99 U/L (ref 10–40)
BASOPHILS # BLD AUTO: 0.02 K/UL (ref 0–0.2)
BASOPHILS NFR BLD: 0.3 % (ref 0–1.9)
BILIRUB SERPL-MCNC: 1.2 MG/DL (ref 0.1–1)
BUN SERPL-MCNC: 30 MG/DL (ref 6–20)
BUN SERPL-MCNC: 30 MG/DL (ref 6–20)
CALCIUM SERPL-MCNC: 7.9 MG/DL (ref 8.7–10.5)
CALCIUM SERPL-MCNC: 7.9 MG/DL (ref 8.7–10.5)
CHLORIDE SERPL-SCNC: 96 MMOL/L (ref 95–110)
CHLORIDE SERPL-SCNC: 96 MMOL/L (ref 95–110)
CO2 SERPL-SCNC: 20 MMOL/L (ref 23–29)
CO2 SERPL-SCNC: 20 MMOL/L (ref 23–29)
CREAT SERPL-MCNC: 7.2 MG/DL (ref 0.5–1.4)
CREAT SERPL-MCNC: 7.2 MG/DL (ref 0.5–1.4)
DIFFERENTIAL METHOD BLD: ABNORMAL
EOSINOPHIL # BLD AUTO: 0.5 K/UL (ref 0–0.5)
EOSINOPHIL NFR BLD: 7.9 % (ref 0–8)
ERYTHROCYTE [DISTWIDTH] IN BLOOD BY AUTOMATED COUNT: 16.4 % (ref 11.5–14.5)
EST. GFR  (NO RACE VARIABLE): 8 ML/MIN/1.73 M^2
EST. GFR  (NO RACE VARIABLE): 8 ML/MIN/1.73 M^2
GLUCOSE SERPL-MCNC: 119 MG/DL (ref 70–110)
GLUCOSE SERPL-MCNC: 119 MG/DL (ref 70–110)
HCT VFR BLD AUTO: 22.9 % (ref 40–54)
HGB BLD-MCNC: 7.7 G/DL (ref 14–18)
IMM GRANULOCYTES # BLD AUTO: 0.07 K/UL (ref 0–0.04)
IMM GRANULOCYTES NFR BLD AUTO: 1.1 % (ref 0–0.5)
LYMPHOCYTES # BLD AUTO: 0.6 K/UL (ref 1–4.8)
LYMPHOCYTES NFR BLD: 9.1 % (ref 18–48)
MAGNESIUM SERPL-MCNC: 2 MG/DL (ref 1.6–2.6)
MCH RBC QN AUTO: 30.2 PG (ref 27–31)
MCHC RBC AUTO-ENTMCNC: 33.6 G/DL (ref 32–36)
MCV RBC AUTO: 90 FL (ref 82–98)
MONOCYTES # BLD AUTO: 0.9 K/UL (ref 0.3–1)
MONOCYTES NFR BLD: 13.3 % (ref 4–15)
NEUTROPHILS # BLD AUTO: 4.5 K/UL (ref 1.8–7.7)
NEUTROPHILS NFR BLD: 68.3 % (ref 38–73)
NRBC BLD-RTO: 0 /100 WBC
PHOSPHATE SERPL-MCNC: 4.1 MG/DL (ref 2.7–4.5)
PHOSPHATE SERPL-MCNC: 4.1 MG/DL (ref 2.7–4.5)
PLATELET # BLD AUTO: 126 K/UL (ref 150–450)
PMV BLD AUTO: 11 FL (ref 9.2–12.9)
POCT GLUCOSE: 138 MG/DL (ref 70–110)
POCT GLUCOSE: 145 MG/DL (ref 70–110)
POCT GLUCOSE: 149 MG/DL (ref 70–110)
POCT GLUCOSE: 159 MG/DL (ref 70–110)
POCT GLUCOSE: 85 MG/DL (ref 70–110)
POCT GLUCOSE: 86 MG/DL (ref 70–110)
POTASSIUM SERPL-SCNC: 3.8 MMOL/L (ref 3.5–5.1)
POTASSIUM SERPL-SCNC: 3.8 MMOL/L (ref 3.5–5.1)
PROT SERPL-MCNC: 5.3 G/DL (ref 6–8.4)
RBC # BLD AUTO: 2.55 M/UL (ref 4.6–6.2)
SODIUM SERPL-SCNC: 131 MMOL/L (ref 136–145)
SODIUM SERPL-SCNC: 131 MMOL/L (ref 136–145)
WBC # BLD AUTO: 6.56 K/UL (ref 3.9–12.7)

## 2024-10-27 PROCEDURE — 27201109 HC SYSTEM FECAL MANAGEMENT

## 2024-10-27 PROCEDURE — C9399 UNCLASSIFIED DRUGS OR BIOLOG: HCPCS | Performed by: INTERNAL MEDICINE

## 2024-10-27 PROCEDURE — 80053 COMPREHEN METABOLIC PANEL: CPT | Performed by: INTERNAL MEDICINE

## 2024-10-27 PROCEDURE — 63600175 PHARM REV CODE 636 W HCPCS: Performed by: INTERNAL MEDICINE

## 2024-10-27 PROCEDURE — 36415 COLL VENOUS BLD VENIPUNCTURE: CPT | Performed by: INTERNAL MEDICINE

## 2024-10-27 PROCEDURE — 99233 SBSQ HOSP IP/OBS HIGH 50: CPT | Mod: ,,, | Performed by: INTERNAL MEDICINE

## 2024-10-27 PROCEDURE — 83735 ASSAY OF MAGNESIUM: CPT | Performed by: INTERNAL MEDICINE

## 2024-10-27 PROCEDURE — 85025 COMPLETE CBC W/AUTO DIFF WBC: CPT | Performed by: INTERNAL MEDICINE

## 2024-10-27 PROCEDURE — 11000001 HC ACUTE MED/SURG PRIVATE ROOM

## 2024-10-27 PROCEDURE — 25000003 PHARM REV CODE 250: Performed by: STUDENT IN AN ORGANIZED HEALTH CARE EDUCATION/TRAINING PROGRAM

## 2024-10-27 PROCEDURE — 84100 ASSAY OF PHOSPHORUS: CPT | Performed by: INTERNAL MEDICINE

## 2024-10-27 PROCEDURE — 99232 SBSQ HOSP IP/OBS MODERATE 35: CPT | Mod: ,,, | Performed by: COLON & RECTAL SURGERY

## 2024-10-27 PROCEDURE — 25000003 PHARM REV CODE 250: Performed by: INTERNAL MEDICINE

## 2024-10-27 RX ORDER — LACTULOSE 10 G/15ML
20 SOLUTION ORAL 3 TIMES DAILY
Status: DISCONTINUED | OUTPATIENT
Start: 2024-10-27 | End: 2024-10-29 | Stop reason: HOSPADM

## 2024-10-27 RX ORDER — HYDRALAZINE HYDROCHLORIDE 50 MG/1
50 TABLET, FILM COATED ORAL EVERY 8 HOURS
Status: DISCONTINUED | OUTPATIENT
Start: 2024-10-27 | End: 2024-10-29 | Stop reason: HOSPADM

## 2024-10-27 RX ORDER — HYDRALAZINE HYDROCHLORIDE 20 MG/ML
10 INJECTION INTRAMUSCULAR; INTRAVENOUS EVERY 6 HOURS PRN
Status: DISCONTINUED | OUTPATIENT
Start: 2024-10-27 | End: 2024-10-28

## 2024-10-27 RX ORDER — CARVEDILOL 12.5 MG/1
12.5 TABLET ORAL 2 TIMES DAILY
Status: DISCONTINUED | OUTPATIENT
Start: 2024-10-27 | End: 2024-10-29 | Stop reason: HOSPADM

## 2024-10-27 RX ORDER — AMLODIPINE BESYLATE 5 MG/1
5 TABLET ORAL DAILY
Status: DISCONTINUED | OUTPATIENT
Start: 2024-10-27 | End: 2024-10-27

## 2024-10-27 RX ORDER — NIFEDIPINE 60 MG/1
60 TABLET, EXTENDED RELEASE ORAL DAILY
Status: DISCONTINUED | OUTPATIENT
Start: 2024-10-28 | End: 2024-10-29 | Stop reason: HOSPADM

## 2024-10-27 RX ADMIN — HYDRALAZINE HYDROCHLORIDE 10 MG: 20 INJECTION, SOLUTION INTRAMUSCULAR; INTRAVENOUS at 01:10

## 2024-10-27 RX ADMIN — AMLODIPINE BESYLATE 5 MG: 5 TABLET ORAL at 11:10

## 2024-10-27 RX ADMIN — CHLORHEXIDINE GLUCONATE 0.12% ORAL RINSE 15 ML: 1.2 LIQUID ORAL at 08:10

## 2024-10-27 RX ADMIN — MUPIROCIN: 20 OINTMENT TOPICAL at 08:10

## 2024-10-27 RX ADMIN — CEFTRIAXONE 1 G: 1 INJECTION, POWDER, FOR SOLUTION INTRAMUSCULAR; INTRAVENOUS at 11:10

## 2024-10-27 RX ADMIN — DEXMEDETOMIDINE HYDROCHLORIDE 0.5 MCG/KG/HR: 4 INJECTION INTRAVENOUS at 05:10

## 2024-10-27 RX ADMIN — PANTOPRAZOLE SODIUM 40 MG: 40 INJECTION, POWDER, FOR SOLUTION INTRAVENOUS at 08:10

## 2024-10-27 RX ADMIN — LACTULOSE 200 G: 10 SOLUTION ORAL at 08:10

## 2024-10-27 RX ADMIN — CARVEDILOL 12.5 MG: 12.5 TABLET, FILM COATED ORAL at 08:10

## 2024-10-27 RX ADMIN — LACTULOSE 20 G: 20 SOLUTION ORAL at 03:10

## 2024-10-27 RX ADMIN — HYDRALAZINE HYDROCHLORIDE 50 MG: 50 TABLET ORAL at 08:10

## 2024-10-27 RX ADMIN — HYDRALAZINE HYDROCHLORIDE 10 MG: 20 INJECTION, SOLUTION INTRAMUSCULAR; INTRAVENOUS at 10:10

## 2024-10-27 RX ADMIN — LACTULOSE 20 G: 20 SOLUTION ORAL at 08:10

## 2024-10-28 LAB
ALBUMIN SERPL BCP-MCNC: 2.8 G/DL (ref 3.5–5.2)
ALBUMIN SERPL BCP-MCNC: 2.8 G/DL (ref 3.5–5.2)
ALP SERPL-CCNC: 156 U/L (ref 40–150)
ALT SERPL W/O P-5'-P-CCNC: 99 U/L (ref 10–44)
ANION GAP SERPL CALC-SCNC: 12 MMOL/L (ref 8–16)
ANION GAP SERPL CALC-SCNC: 12 MMOL/L (ref 8–16)
AST SERPL-CCNC: 85 U/L (ref 10–40)
BASOPHILS # BLD AUTO: 0.03 K/UL (ref 0–0.2)
BASOPHILS NFR BLD: 0.3 % (ref 0–1.9)
BILIRUB SERPL-MCNC: 1.1 MG/DL (ref 0.1–1)
BUN SERPL-MCNC: 34 MG/DL (ref 6–20)
BUN SERPL-MCNC: 34 MG/DL (ref 6–20)
CALCIUM SERPL-MCNC: 8 MG/DL (ref 8.7–10.5)
CALCIUM SERPL-MCNC: 8 MG/DL (ref 8.7–10.5)
CHLORIDE SERPL-SCNC: 96 MMOL/L (ref 95–110)
CHLORIDE SERPL-SCNC: 96 MMOL/L (ref 95–110)
CO2 SERPL-SCNC: 25 MMOL/L (ref 23–29)
CO2 SERPL-SCNC: 25 MMOL/L (ref 23–29)
CREAT SERPL-MCNC: 8.8 MG/DL (ref 0.5–1.4)
CREAT SERPL-MCNC: 8.8 MG/DL (ref 0.5–1.4)
DIFFERENTIAL METHOD BLD: ABNORMAL
EOSINOPHIL # BLD AUTO: 0.6 K/UL (ref 0–0.5)
EOSINOPHIL NFR BLD: 6.8 % (ref 0–8)
ERYTHROCYTE [DISTWIDTH] IN BLOOD BY AUTOMATED COUNT: 16.8 % (ref 11.5–14.5)
EST. GFR  (NO RACE VARIABLE): 6 ML/MIN/1.73 M^2
EST. GFR  (NO RACE VARIABLE): 6 ML/MIN/1.73 M^2
GLUCOSE SERPL-MCNC: 77 MG/DL (ref 70–110)
GLUCOSE SERPL-MCNC: 77 MG/DL (ref 70–110)
HCT VFR BLD AUTO: 22.2 % (ref 40–54)
HGB BLD-MCNC: 7.6 G/DL (ref 14–18)
IMM GRANULOCYTES # BLD AUTO: 0.13 K/UL (ref 0–0.04)
IMM GRANULOCYTES NFR BLD AUTO: 1.5 % (ref 0–0.5)
LYMPHOCYTES # BLD AUTO: 0.8 K/UL (ref 1–4.8)
LYMPHOCYTES NFR BLD: 8.4 % (ref 18–48)
MAGNESIUM SERPL-MCNC: 2.1 MG/DL (ref 1.6–2.6)
MCH RBC QN AUTO: 30.3 PG (ref 27–31)
MCHC RBC AUTO-ENTMCNC: 34.2 G/DL (ref 32–36)
MCV RBC AUTO: 88 FL (ref 82–98)
MONOCYTES # BLD AUTO: 1.3 K/UL (ref 0.3–1)
MONOCYTES NFR BLD: 14.4 % (ref 4–15)
NEUTROPHILS # BLD AUTO: 6.1 K/UL (ref 1.8–7.7)
NEUTROPHILS NFR BLD: 68.6 % (ref 38–73)
NRBC BLD-RTO: 0 /100 WBC
PHOSPHATE SERPL-MCNC: 4.7 MG/DL (ref 2.7–4.5)
PHOSPHATE SERPL-MCNC: 4.7 MG/DL (ref 2.7–4.5)
PLATELET # BLD AUTO: 156 K/UL (ref 150–450)
PMV BLD AUTO: 10.6 FL (ref 9.2–12.9)
POCT GLUCOSE: 86 MG/DL (ref 70–110)
POCT GLUCOSE: 87 MG/DL (ref 70–110)
POCT GLUCOSE: 90 MG/DL (ref 70–110)
POTASSIUM SERPL-SCNC: 3.5 MMOL/L (ref 3.5–5.1)
POTASSIUM SERPL-SCNC: 3.5 MMOL/L (ref 3.5–5.1)
PROT SERPL-MCNC: 5.8 G/DL (ref 6–8.4)
RBC # BLD AUTO: 2.51 M/UL (ref 4.6–6.2)
SODIUM SERPL-SCNC: 133 MMOL/L (ref 136–145)
SODIUM SERPL-SCNC: 133 MMOL/L (ref 136–145)
WBC # BLD AUTO: 8.88 K/UL (ref 3.9–12.7)

## 2024-10-28 PROCEDURE — 63600175 PHARM REV CODE 636 W HCPCS: Performed by: INTERNAL MEDICINE

## 2024-10-28 PROCEDURE — 25000003 PHARM REV CODE 250: Performed by: STUDENT IN AN ORGANIZED HEALTH CARE EDUCATION/TRAINING PROGRAM

## 2024-10-28 PROCEDURE — 83735 ASSAY OF MAGNESIUM: CPT | Performed by: INTERNAL MEDICINE

## 2024-10-28 PROCEDURE — 11000001 HC ACUTE MED/SURG PRIVATE ROOM

## 2024-10-28 PROCEDURE — 85025 COMPLETE CBC W/AUTO DIFF WBC: CPT | Performed by: INTERNAL MEDICINE

## 2024-10-28 PROCEDURE — 80053 COMPREHEN METABOLIC PANEL: CPT | Performed by: INTERNAL MEDICINE

## 2024-10-28 PROCEDURE — 80100016 HC MAINTENANCE HEMODIALYSIS

## 2024-10-28 PROCEDURE — 25000003 PHARM REV CODE 250: Performed by: NURSE PRACTITIONER

## 2024-10-28 PROCEDURE — 25000003 PHARM REV CODE 250: Performed by: INTERNAL MEDICINE

## 2024-10-28 PROCEDURE — 84100 ASSAY OF PHOSPHORUS: CPT | Performed by: INTERNAL MEDICINE

## 2024-10-28 PROCEDURE — 63600175 PHARM REV CODE 636 W HCPCS: Performed by: NURSE PRACTITIONER

## 2024-10-28 RX ORDER — SODIUM CHLORIDE 9 MG/ML
INJECTION, SOLUTION INTRAVENOUS
Status: CANCELLED | OUTPATIENT
Start: 2024-10-28

## 2024-10-28 RX ORDER — DIPHENHYDRAMINE HCL 25 MG
50 CAPSULE ORAL EVERY 6 HOURS PRN
Status: DISCONTINUED | OUTPATIENT
Start: 2024-10-28 | End: 2024-10-29 | Stop reason: HOSPADM

## 2024-10-28 RX ORDER — ONDANSETRON HYDROCHLORIDE 2 MG/ML
4 INJECTION, SOLUTION INTRAVENOUS EVERY 6 HOURS PRN
Status: DISCONTINUED | OUTPATIENT
Start: 2024-10-28 | End: 2024-10-29 | Stop reason: HOSPADM

## 2024-10-28 RX ORDER — SODIUM CHLORIDE 9 MG/ML
INJECTION, SOLUTION INTRAVENOUS ONCE
Status: DISCONTINUED | OUTPATIENT
Start: 2024-10-28 | End: 2024-10-29 | Stop reason: HOSPADM

## 2024-10-28 RX ORDER — HYDRALAZINE HYDROCHLORIDE 20 MG/ML
10 INJECTION INTRAMUSCULAR; INTRAVENOUS EVERY 6 HOURS PRN
Status: DISCONTINUED | OUTPATIENT
Start: 2024-10-28 | End: 2024-10-29 | Stop reason: HOSPADM

## 2024-10-28 RX ORDER — SODIUM CHLORIDE 9 MG/ML
INJECTION, SOLUTION INTRAVENOUS ONCE
Status: CANCELLED | OUTPATIENT
Start: 2024-10-28 | End: 2024-10-28

## 2024-10-28 RX ADMIN — CEFTRIAXONE 1 G: 1 INJECTION, POWDER, FOR SOLUTION INTRAMUSCULAR; INTRAVENOUS at 11:10

## 2024-10-28 RX ADMIN — MUPIROCIN: 20 OINTMENT TOPICAL at 09:10

## 2024-10-28 RX ADMIN — PANTOPRAZOLE SODIUM 40 MG: 40 INJECTION, POWDER, FOR SOLUTION INTRAVENOUS at 10:10

## 2024-10-28 RX ADMIN — NIFEDIPINE 60 MG: 60 TABLET, FILM COATED, EXTENDED RELEASE ORAL at 09:10

## 2024-10-28 RX ADMIN — CARVEDILOL 12.5 MG: 12.5 TABLET, FILM COATED ORAL at 09:10

## 2024-10-28 RX ADMIN — SUCROFERRIC OXYHYDROXIDE 1000 MG: 500 TABLET, CHEWABLE ORAL at 07:10

## 2024-10-28 RX ADMIN — SUCROFERRIC OXYHYDROXIDE 1000 MG: 500 TABLET, CHEWABLE ORAL at 11:10

## 2024-10-28 RX ADMIN — HYDRALAZINE HYDROCHLORIDE 50 MG: 50 TABLET ORAL at 09:10

## 2024-10-28 RX ADMIN — DIPHENHYDRAMINE HYDROCHLORIDE 50 MG: 25 CAPSULE ORAL at 02:10

## 2024-10-28 RX ADMIN — ONDANSETRON 4 MG: 2 INJECTION INTRAMUSCULAR; INTRAVENOUS at 01:10

## 2024-10-28 RX ADMIN — CARVEDILOL 12.5 MG: 12.5 TABLET, FILM COATED ORAL at 08:10

## 2024-10-28 RX ADMIN — HYDRALAZINE HYDROCHLORIDE 50 MG: 50 TABLET ORAL at 06:10

## 2024-10-28 RX ADMIN — CHLORHEXIDINE GLUCONATE 0.12% ORAL RINSE 15 ML: 1.2 LIQUID ORAL at 09:10

## 2024-10-28 RX ADMIN — PANTOPRAZOLE SODIUM 40 MG: 40 INJECTION, POWDER, FOR SOLUTION INTRAVENOUS at 09:10

## 2024-10-28 RX ADMIN — CHLORHEXIDINE GLUCONATE 0.12% ORAL RINSE 15 ML: 1.2 LIQUID ORAL at 08:10

## 2024-10-29 VITALS
HEART RATE: 60 BPM | SYSTOLIC BLOOD PRESSURE: 130 MMHG | DIASTOLIC BLOOD PRESSURE: 76 MMHG | TEMPERATURE: 97 F | RESPIRATION RATE: 18 BRPM | OXYGEN SATURATION: 94 % | WEIGHT: 197.31 LBS | HEIGHT: 67 IN | BODY MASS INDEX: 30.97 KG/M2

## 2024-10-29 LAB
BACTERIA FLD AEROBE CULT: NO GROWTH
GRAM STN SPEC: NORMAL
GRAM STN SPEC: NORMAL